# Patient Record
Sex: FEMALE | Race: WHITE | Employment: PART TIME | URBAN - METROPOLITAN AREA
[De-identification: names, ages, dates, MRNs, and addresses within clinical notes are randomized per-mention and may not be internally consistent; named-entity substitution may affect disease eponyms.]

---

## 2019-02-12 NOTE — Clinical Note
Thank you for referring Tejal to the [facility]. Consult was completed today via person.  I have referred for a nutrition consultation with our dietician.  I counseled on the importance of lifestyle intervention in adjunct with medication and started Nal/Bupropion XL for treatment with follow-up advised in about 3 months. 
no

## 2022-02-15 PROBLEM — G47.10 HYPERSOMNIA, PERSISTENT: Status: ACTIVE | Noted: 2022-02-15

## 2022-02-15 PROBLEM — G47.10 HYPERSOMNIA: Status: ACTIVE | Noted: 2022-02-15

## 2022-12-06 ENCOUNTER — OFFICE VISIT (OUTPATIENT)
Facility: LOCATION | Age: 67
End: 2022-12-06
Payer: MEDICARE

## 2022-12-06 VITALS — TEMPERATURE: 97 F | HEART RATE: 69 BPM

## 2022-12-06 DIAGNOSIS — M79.651 PAIN IN RIGHT THIGH: Primary | ICD-10-CM

## 2022-12-12 ENCOUNTER — TELEPHONE (OUTPATIENT)
Facility: LOCATION | Age: 67
End: 2022-12-12

## 2022-12-12 NOTE — TELEPHONE ENCOUNTER
Pt is having an MRI of the right thigh on Thursday 12/15. Pt states she is also having issues with lipomas on her left thigh also and would like to know if the MRI can also be for the left leg.

## 2022-12-15 ENCOUNTER — HOSPITAL ENCOUNTER (OUTPATIENT)
Dept: MRI IMAGING | Age: 67
Discharge: HOME | End: 2022-12-15
Attending: SURGERY
Payer: MEDICARE

## 2022-12-15 ENCOUNTER — TELEPHONE (OUTPATIENT)
Facility: LOCATION | Age: 67
End: 2022-12-15

## 2022-12-15 DIAGNOSIS — M79.651 PAIN IN RIGHT THIGH: ICD-10-CM

## 2022-12-15 PROCEDURE — A9575 INJ GADOTERATE MEGLUMI 0.1ML: HCPCS

## 2022-12-15 PROCEDURE — 73720 MRI LWR EXTREMITY W/O&W/DYE: CPT | Performed by: SURGERY

## 2022-12-15 RX ORDER — GADOTERATE MEGLUMINE 376.9 MG/ML
20 INJECTION INTRAVENOUS
Status: COMPLETED | OUTPATIENT
Start: 2022-12-15 | End: 2022-12-15

## 2022-12-15 RX ADMIN — GADOTERATE MEGLUMINE 20 ML: 376.9 INJECTION INTRAVENOUS at 15:27:00

## 2022-12-15 NOTE — TELEPHONE ENCOUNTER
Per Medicare Guidelines -no authorization is required for Excision Masses of Right Thigh CPT 46208    Status: Authorization is not required however may be subject to review once claim is submitted-Covered Benefit

## 2022-12-21 DIAGNOSIS — M79.651 PAIN OF RIGHT THIGH: Primary | ICD-10-CM

## 2023-01-03 ENCOUNTER — TELEPHONE (OUTPATIENT)
Dept: ORTHOPEDICS CLINIC | Facility: CLINIC | Age: 68
End: 2023-01-03

## 2023-01-03 DIAGNOSIS — M25.552 BILATERAL HIP PAIN: Primary | ICD-10-CM

## 2023-01-03 DIAGNOSIS — M25.551 BILATERAL HIP PAIN: Primary | ICD-10-CM

## 2023-01-03 NOTE — TELEPHONE ENCOUNTER
NP Bilateral Hip Pain/MRI in Epic. Please advise if additional imaging is needed.   Future Appointments   Date Time Provider Celestina Savannah   2/3/2023  9:00 AM Johnny Manley MD [facility] [id number]   3/6/2023  9:00 AM CARLO Dawson [facility] ([facility]) BRIDGETTE Lopes

## 2023-02-03 ENCOUNTER — HOSPITAL ENCOUNTER (OUTPATIENT)
Dept: GENERAL RADIOLOGY | Age: 68
Discharge: HOME | End: 2023-02-03
Attending: ORTHOPAEDIC SURGERY
Payer: MEDICARE

## 2023-02-03 ENCOUNTER — OFFICE VISIT (OUTPATIENT)
Dept: ORTHOPEDICS CLINIC | Facility: CLINIC | Age: 68
End: 2023-02-03
Payer: MEDICARE

## 2023-02-03 VITALS — OXYGEN SATURATION: 97 % | WEIGHT: 238 LBS | BODY MASS INDEX: 42.17 KG/M2 | HEIGHT: 63 IN | HEART RATE: 65 BPM

## 2023-02-03 DIAGNOSIS — M25.552 BILATERAL HIP PAIN: ICD-10-CM

## 2023-02-03 DIAGNOSIS — M16.0 PRIMARY OSTEOARTHRITIS OF BOTH HIPS: Primary | ICD-10-CM

## 2023-02-03 DIAGNOSIS — M25.551 BILATERAL HIP PAIN: ICD-10-CM

## 2023-02-03 PROCEDURE — 99204 OFFICE O/P NEW MOD 45 MIN: CPT | Performed by: ORTHOPAEDIC SURGERY

## 2023-02-03 PROCEDURE — 73523 X-RAY EXAM HIPS BI 5/> VIEWS: CPT | Performed by: ORTHOPAEDIC SURGERY

## 2023-03-17 ENCOUNTER — OFFICE VISIT (OUTPATIENT)
Dept: ORTHOPEDICS CLINIC | Facility: CLINIC | Age: 68
End: 2023-03-17
Payer: MEDICARE

## 2023-03-17 VITALS — HEART RATE: 64 BPM | OXYGEN SATURATION: 95 %

## 2023-03-17 DIAGNOSIS — M16.0 PRIMARY OSTEOARTHRITIS OF BOTH HIPS: Primary | ICD-10-CM

## 2023-03-17 PROCEDURE — 20611 DRAIN/INJ JOINT/BURSA W/US: CPT | Performed by: ORTHOPAEDIC SURGERY

## 2023-03-17 RX ORDER — TRIAMCINOLONE ACETONIDE 40 MG/ML
80 INJECTION, SUSPENSION INTRA-ARTICULAR; INTRAMUSCULAR ONCE
Status: COMPLETED | OUTPATIENT
Start: 2023-03-17 | End: 2023-03-17

## 2023-03-17 RX ADMIN — TRIAMCINOLONE ACETONIDE 80 MG: 40 INJECTION, SUSPENSION INTRA-ARTICULAR; INTRAMUSCULAR at 09:45:00

## 2023-03-17 NOTE — PROCEDURES
After informed consent, the patient's right and left hips were marked, prepped with topical antiseptic, and locally anesthetized with skin refrigerant followed by injection of 1% lidocaine to create a skin wheal anteriorly at an insertion site a few fingerbreadths lateral to the femoral pulse, along the trajectory of the femoral neck. Next, the area was re-prepped with topical antiseptic, and ultrasound guidance was performed to direct a 20 gauge spinal needle into the hip joint at the junction of the femoral head and neck, after which a mixture of 1mL 40mg/mL Kenalog, 2mL 1% lidocaine and 2mL 0.5% marcaine was injected while visualizing the fluid under ultrasound. Confirmatory imaging was saved to the patient's chart. A band-aid was applied. The patient tolerated the procedure well.     Klever Cast MD, [address] [facility]  Phone [phone]  Fax [phone]

## 2024-01-19 ENCOUNTER — OFFICE VISIT (OUTPATIENT)
Dept: ORTHOPEDICS CLINIC | Facility: CLINIC | Age: 69
End: 2024-01-19
Payer: MEDICARE

## 2024-01-19 DIAGNOSIS — M16.0 PRIMARY OSTEOARTHRITIS OF BOTH HIPS: Primary | ICD-10-CM

## 2024-01-19 PROCEDURE — 20611 DRAIN/INJ JOINT/BURSA W/US: CPT | Performed by: ORTHOPAEDIC SURGERY

## 2024-01-19 RX ORDER — TRIAMCINOLONE ACETONIDE 40 MG/ML
80 INJECTION, SUSPENSION INTRA-ARTICULAR; INTRAMUSCULAR ONCE
Status: COMPLETED | OUTPATIENT
Start: 2024-01-19 | End: 2024-01-19

## 2024-01-19 RX ADMIN — TRIAMCINOLONE ACETONIDE 80 MG: 40 INJECTION, SUSPENSION INTRA-ARTICULAR; INTRAMUSCULAR at 09:23:00

## 2024-01-19 NOTE — PROCEDURES
Risks and benefits of hip injection discussed with the patient, with risks including but not limited to pain and swelling at the injection site and/or within the hip joint, infection, elevation in blood pressure and/or glucose levels, facial flushing. After informed consent, the patient's right and left hips were marked, prepped with topical antiseptic, and locally anesthetized with skin refrigerant followed by injection of 1% lidocaine to create a skin wheal anteriorly at an insertion site a few fingerbreadths lateral to the femoral pulse, along the trajectory of the femoral neck.  Next, the area was re-prepped with topical antiseptic, and ultrasound guidance was performed to direct a 20 gauge spinal needle into the hip joint at the junction of the femoral head and neck, after which a mixture of 1mL 40mg/mL Kenalog, 2mL 1% lidocaine and 2mL 0.5% marcaine was injected while visualizing the fluid under ultrasound.  Confirmatory imaging was saved to the patient's chart.  A band-aid was applied.  The patient tolerated the procedure well.    Yosef Prince MD, FAAOS  [facility]  Phone [phone]  Fax [phone]

## 2024-04-29 ENCOUNTER — TELEPHONE (OUTPATIENT)
Dept: ORTHOPEDICS CLINIC | Facility: CLINIC | Age: 69
End: 2024-04-29

## 2024-04-29 DIAGNOSIS — M25.561 PAIN IN BOTH KNEES, UNSPECIFIED CHRONICITY: Primary | ICD-10-CM

## 2024-04-29 DIAGNOSIS — M25.562 PAIN IN BOTH KNEES, UNSPECIFIED CHRONICITY: Primary | ICD-10-CM

## 2024-04-29 NOTE — TELEPHONE ENCOUNTER
Patient is coming in for bilateral knee and shin pain.    No xray or imaging in EPIC  Please advise if needed.

## 2024-05-06 ENCOUNTER — HOSPITAL ENCOUNTER (OUTPATIENT)
Dept: GENERAL RADIOLOGY | Age: 69
Discharge: HOME | End: 2024-05-06
Attending: FAMILY MEDICINE
Payer: MEDICARE

## 2024-05-06 ENCOUNTER — OFFICE VISIT (OUTPATIENT)
Dept: ORTHOPEDICS CLINIC | Facility: CLINIC | Age: 69
End: 2024-05-06
Payer: MEDICARE

## 2024-05-06 VITALS — BODY MASS INDEX: 49.09 KG/M2 | HEIGHT: 61 IN | WEIGHT: 260 LBS

## 2024-05-06 DIAGNOSIS — M17.12 PRIMARY OSTEOARTHRITIS OF LEFT KNEE: Primary | ICD-10-CM

## 2024-05-06 DIAGNOSIS — M25.562 PAIN IN BOTH KNEES, UNSPECIFIED CHRONICITY: ICD-10-CM

## 2024-05-06 DIAGNOSIS — M17.11 PRIMARY OSTEOARTHRITIS OF RIGHT KNEE: ICD-10-CM

## 2024-05-06 DIAGNOSIS — M25.561 PAIN IN BOTH KNEES, UNSPECIFIED CHRONICITY: ICD-10-CM

## 2024-05-06 PROCEDURE — 73564 X-RAY EXAM KNEE 4 OR MORE: CPT | Performed by: FAMILY MEDICINE

## 2024-05-06 PROCEDURE — 99204 OFFICE O/P NEW MOD 45 MIN: CPT | Performed by: FAMILY MEDICINE

## 2024-05-06 PROCEDURE — 20610 DRAIN/INJ JOINT/BURSA W/O US: CPT | Performed by: FAMILY MEDICINE

## 2024-05-06 RX ORDER — TRIAMCINOLONE ACETONIDE 40 MG/ML
40 INJECTION, SUSPENSION INTRA-ARTICULAR; INTRAMUSCULAR ONCE
Status: COMPLETED | OUTPATIENT
Start: 2024-05-06 | End: 2024-05-06

## 2024-05-06 RX ORDER — KETOROLAC TROMETHAMINE 30 MG/ML
30 INJECTION, SOLUTION INTRAMUSCULAR; INTRAVENOUS ONCE
Status: COMPLETED | OUTPATIENT
Start: 2024-05-06 | End: 2024-05-06

## 2024-05-06 RX ORDER — DICLOFENAC SODIUM 75 MG/1
75 TABLET, DELAYED RELEASE ORAL 2 TIMES DAILY
Qty: 28 TABLET | Refills: 1 | Status: SHIPPED | OUTPATIENT
Start: 2024-05-06

## 2024-05-06 RX ADMIN — TRIAMCINOLONE ACETONIDE 40 MG: 40 INJECTION, SUSPENSION INTRA-ARTICULAR; INTRAMUSCULAR at 10:42:00

## 2024-05-06 RX ADMIN — KETOROLAC TROMETHAMINE 30 MG: 30 INJECTION, SOLUTION INTRAMUSCULAR; INTRAVENOUS at 10:42:00

## 2024-05-08 NOTE — H&P
Sports Medicine Clinic Note     Subjective:    Chief Complaint   Patient presents with    Knee Pain     Bl knee pain onset: 3 weeks ago  - had steroid inj in 01.24 for hips   - did a lot of work cleaning and pain has worsened since, pain on the shins and lower part of the knee      Chief Complaint: Bilateral knee pain.    HPI: This is a pleasant 68 year old female who presents with persistent bilateral knee pain present for years but worse for the past several weeks. The pain is described as aching in quality.     - The pain is described as a deep, aching sensation.   - The pain seems to be aggravated by prolonged walking or standing and seems to improve with rest.   - There is no history of any acute trauma or injury.   - The patient mentions occasional morning stiffness which improves with activity.   - The patient does not report any locking, catching, or giving way of the knee.   - The patient has tried over-the-counter analgesics with minimal relief.    Has previously seen my colleague Dr. Prince for her hips and did well with articular CSI, requests similar treatment for her knee pain today.     Objective:    Bilateral Knee Exam:    Inspection:  Antalgic gait.  Varus alignment.  No appreciable muscle atrophy.  No warmth, erythema, or effusion.    Palpation:  Tenderness over the medial joint line, lateral joint line, patella, with patellofemoral compression  No tenderness over the tibial tubercle, suprapatellar pouch, pes anserine bursa, popliteal fossa, distal IT band, or other compartments of the leg.  Flexion from 0-135 degrees.  Extensor mechanism intact and without palpable defects.  No audible crepitus.    Neurovascular  SILT S / S / SP / DP / Tib nerve distributions. Fires quad / hamstrings / GSC / TA / EHL.  2+ DP & PT pulses, brisk cap refill    Special  Normal patellar tracking  No laxity/opening to varus/valgus force at both 30 and 0 degrees  Negative Pivot Shift  Negative Lachman  Negative  Posterior Drawer  Negative Bryant    Diagnostic Studies:    Radiographs of the both knees personally reviewed in office and no apparent fracture or dislocation seen. Joint space narrowing with marginal osteophytes are noted in all compartments, most prominent in the medial tibiofemoral compartments    Assessment:    68 year old female with clinical history and examination consistent with    Primary osteoarthritis, bilateral knee    Plan:    The patient is recommended to undergo PT order focusing on quadriceps strengthening and mobility exercises, deferred for now in favor of better pain control. Recommend medications such as Tylenol for pain management. Encouraged weight loss which she is actively working on. A prescription for Oral Diclofenac daily is also given for additional pain control. After a comprehensive discussion about the potential benefits and limitations of injection therapy as a treatment option for the suspected diagnosis, the patient opted to proceed with a therapeutic injection during today's office visit. Activity is advised to be limited to tolerance. Tentative follow-up scheduled in 1-2 weeks to reassess the treatment plan and adapt as necessary.     Procedure Note:    After discussion of the risks and benefits, the patient elected to proceed with a therapeutic injection into the bilateral knee. Confirmed that the patient does not have history of prior adverse reactions, active infections, or relevant allergies. There was no erythema or warmth, and the skin was clear.    The skin was sterilized with ChloraPrep. A 22 gauge needle was inserted via inferolateral approach. The site was injected with a mixture of 1 mL of kenalog 40 mg/mL, 1 mL of Toradol 30 mg/mL and 1 mL of 1% Lidocaine without Epinephrine. The injection was completed without complication, and a bandage was applied.    The patient tolerated the procedure well and was instructed to avoid strenuous activity for the next 24-48 hours  and to use ice or Tylenol for pain as needed. The patient will call immediately with any signs of infection or allergic reaction.    POST-INJECTION CARE: The patient tolerated the procedure well. An occlusive bandage was placed over the injection site. Post-injection care instructions provided to the patient. The patient was asked to avoid strenuous activity and continue to rest the area for 2-3 days before resuming regular activities. Patient advised that the area may be more painful for the first 1-2 days. They can use ice or Tylenol for pain as needed.  Patient was instructed to watch for fever, increased swelling, or persistent pain. The patient will call immediately with any signs of infection or allergic reaction.    COMPLICATIONS: The patient tolerated the procedure well without any complications.        Bjorn Ramirez DO, CAQSM   Primary Care Sports Medicine    Department of Orthopaedic Surgery  [address] [address]   [address] [place] [id number]    t: [phone]  f: [phone]      [facility]

## 2024-05-09 ENCOUNTER — PATIENT MESSAGE (OUTPATIENT)
Dept: ORTHOPEDICS CLINIC | Facility: CLINIC | Age: 69
End: 2024-05-09

## 2024-05-09 NOTE — TELEPHONE ENCOUNTER
LOV 5/6/24 knee steroid.  OV note states return to clinic 1-2 weeks.  Pt scheduled 10 day return visit to address hip pain, difficulty ambulating.  Pt wants to confirm is okay to get hip inj so close to when had knee inj.   Thank you!

## 2024-05-09 NOTE — TELEPHONE ENCOUNTER
From: Tejal Franz  To: Bjorn POST  Sent: 5/9/2024 9:37 AM CDT  Subject: OK to have earlier appointment    Hi Dr. Ramirez, I saw you on 5/6/24, and you gave me knee injections. You wanted to see me 2 weeks later. Made appointment for 5/23/24 for follow up and hip injections. I can hardly walk now, and in a lot of pain. I just made another appointment for 5/16/24, which is only 10 days after the knee injections. Is that okay, or do I need to wait until 5/23/24. Thank you!

## 2024-05-14 ENCOUNTER — OFFICE VISIT (OUTPATIENT)
Dept: FAMILY MEDICINE CLINIC | Facility: CLINIC | Age: 69
End: 2024-05-14

## 2024-05-14 ENCOUNTER — LAB ENCOUNTER (OUTPATIENT)
Dept: LAB | Age: 69
End: 2024-05-14
Attending: NURSE PRACTITIONER

## 2024-05-14 ENCOUNTER — TELEPHONE (OUTPATIENT)
Facility: CLINIC | Age: 69
End: 2024-05-14

## 2024-05-14 VITALS
DIASTOLIC BLOOD PRESSURE: 82 MMHG | RESPIRATION RATE: 20 BRPM | HEART RATE: 72 BPM | TEMPERATURE: 98 F | SYSTOLIC BLOOD PRESSURE: 126 MMHG | WEIGHT: 262 LBS | HEIGHT: 61.22 IN | BODY MASS INDEX: 49.47 KG/M2

## 2024-05-14 DIAGNOSIS — Z23 NEED FOR VACCINATION: ICD-10-CM

## 2024-05-14 DIAGNOSIS — Z13.820 SCREENING FOR OSTEOPOROSIS: ICD-10-CM

## 2024-05-14 DIAGNOSIS — Z13.0 SCREENING FOR DEFICIENCY ANEMIA: ICD-10-CM

## 2024-05-14 DIAGNOSIS — Z13.29 SCREENING FOR THYROID DISORDER: ICD-10-CM

## 2024-05-14 DIAGNOSIS — M25.50 PAIN IN JOINT, MULTIPLE SITES: Primary | ICD-10-CM

## 2024-05-14 DIAGNOSIS — E66.01 MORBID OBESITY WITH BMI OF 45.0-49.9, ADULT (HCC): ICD-10-CM

## 2024-05-14 DIAGNOSIS — Z12.11 SCREENING FOR MALIGNANT NEOPLASM OF COLON: ICD-10-CM

## 2024-05-14 DIAGNOSIS — M25.50 ARTHRALGIA OF MULTIPLE JOINTS: ICD-10-CM

## 2024-05-14 DIAGNOSIS — Z82.49 FAMILY HISTORY OF HEART DISEASE: ICD-10-CM

## 2024-05-14 DIAGNOSIS — Z13.1 SCREENING FOR DIABETES MELLITUS: ICD-10-CM

## 2024-05-14 DIAGNOSIS — I83.90 VARICOSE VEINS: ICD-10-CM

## 2024-05-14 DIAGNOSIS — M16.0 PRIMARY OSTEOARTHRITIS OF BOTH HIPS: ICD-10-CM

## 2024-05-14 DIAGNOSIS — Z12.31 ENCOUNTER FOR SCREENING MAMMOGRAM FOR MALIGNANT NEOPLASM OF BREAST: ICD-10-CM

## 2024-05-14 DIAGNOSIS — Z13.220 SCREENING FOR LIPID DISORDERS: ICD-10-CM

## 2024-05-14 DIAGNOSIS — Z00.00 ENCOUNTER FOR ANNUAL HEALTH EXAMINATION: Primary | ICD-10-CM

## 2024-05-14 DIAGNOSIS — M17.0 PRIMARY OSTEOARTHRITIS OF BOTH KNEES: ICD-10-CM

## 2024-05-14 DIAGNOSIS — Z78.0 POST-MENOPAUSAL: ICD-10-CM

## 2024-05-14 DIAGNOSIS — Z71.85 VACCINE COUNSELING: ICD-10-CM

## 2024-05-14 LAB
ALBUMIN SERPL-MCNC: 3.5 G/DL (ref 3.4–5)
ALBUMIN/GLOB SERPL: 0.8 {RATIO} (ref 1–2)
ALP LIVER SERPL-CCNC: 73 U/L
ALT SERPL-CCNC: 25 U/L
ANION GAP SERPL CALC-SCNC: 6 MMOL/L (ref 0–18)
AST SERPL-CCNC: 25 U/L (ref 15–37)
BASOPHILS # BLD AUTO: 0.03 X10(3) UL (ref 0–0.2)
BASOPHILS NFR BLD AUTO: 0.4 %
BILIRUB SERPL-MCNC: 0.6 MG/DL (ref 0.1–2)
BUN BLD-MCNC: 27 MG/DL (ref 9–23)
CALCIUM BLD-MCNC: 9.4 MG/DL (ref 8.5–10.1)
CHLORIDE SERPL-SCNC: 107 MMOL/L (ref 98–112)
CHOLEST SERPL-MCNC: 183 MG/DL (ref ?–200)
CO2 SERPL-SCNC: 26 MMOL/L (ref 21–32)
CREAT BLD-MCNC: 0.85 MG/DL
CRP SERPL-MCNC: 1.3 MG/DL (ref ?–0.3)
EGFRCR SERPLBLD CKD-EPI 2021: 74 ML/MIN/1.73M2 (ref 60–?)
EOSINOPHIL # BLD AUTO: 0.21 X10(3) UL (ref 0–0.7)
EOSINOPHIL NFR BLD AUTO: 2.6 %
ERYTHROCYTE [DISTWIDTH] IN BLOOD BY AUTOMATED COUNT: 13 %
ERYTHROCYTE [SEDIMENTATION RATE] IN BLOOD: 49 MM/HR
EST. AVERAGE GLUCOSE BLD GHB EST-MCNC: 120 MG/DL (ref 68–126)
FASTING PATIENT LIPID ANSWER: NO
FASTING STATUS PATIENT QL REPORTED: NO
GLOBULIN PLAS-MCNC: 4.4 G/DL (ref 2.8–4.4)
GLUCOSE BLD-MCNC: 84 MG/DL (ref 70–99)
HBA1C MFR BLD: 5.8 % (ref ?–5.7)
HCT VFR BLD AUTO: 45 %
HDLC SERPL-MCNC: 64 MG/DL (ref 40–59)
HGB BLD-MCNC: 14.3 G/DL
IMM GRANULOCYTES # BLD AUTO: 0.03 X10(3) UL (ref 0–1)
IMM GRANULOCYTES NFR BLD: 0.4 %
LDLC SERPL CALC-MCNC: 101 MG/DL (ref ?–100)
LYMPHOCYTES # BLD AUTO: 2.69 X10(3) UL (ref 1–4)
LYMPHOCYTES NFR BLD AUTO: 33.5 %
MCH RBC QN AUTO: 29.1 PG (ref 26–34)
MCHC RBC AUTO-ENTMCNC: 31.8 G/DL (ref 31–37)
MCV RBC AUTO: 91.5 FL
MONOCYTES # BLD AUTO: 0.81 X10(3) UL (ref 0.1–1)
MONOCYTES NFR BLD AUTO: 10.1 %
NEUTROPHILS # BLD AUTO: 4.27 X10 (3) UL (ref 1.5–7.7)
NEUTROPHILS # BLD AUTO: 4.27 X10(3) UL (ref 1.5–7.7)
NEUTROPHILS NFR BLD AUTO: 53 %
NONHDLC SERPL-MCNC: 119 MG/DL (ref ?–130)
OSMOLALITY SERPL CALC.SUM OF ELEC: 292 MOSM/KG (ref 275–295)
PLATELET # BLD AUTO: 269 10(3)UL (ref 150–450)
POTASSIUM SERPL-SCNC: 4.6 MMOL/L (ref 3.5–5.1)
PROT SERPL-MCNC: 7.9 G/DL (ref 6.4–8.2)
RBC # BLD AUTO: 4.92 X10(6)UL
SODIUM SERPL-SCNC: 139 MMOL/L (ref 136–145)
TRIGL SERPL-MCNC: 102 MG/DL (ref 30–149)
TSI SER-ACNC: 2.27 MIU/ML (ref 0.36–3.74)
URATE SERPL-MCNC: 4.4 MG/DL
VLDLC SERPL CALC-MCNC: 17 MG/DL (ref 0–30)
WBC # BLD AUTO: 8 X10(3) UL (ref 4–11)

## 2024-05-14 PROCEDURE — 85652 RBC SED RATE AUTOMATED: CPT

## 2024-05-14 PROCEDURE — 80053 COMPREHEN METABOLIC PANEL: CPT

## 2024-05-14 PROCEDURE — 86225 DNA ANTIBODY NATIVE: CPT

## 2024-05-14 PROCEDURE — 80061 LIPID PANEL: CPT

## 2024-05-14 PROCEDURE — 86038 ANTINUCLEAR ANTIBODIES: CPT

## 2024-05-14 PROCEDURE — 86431 RHEUMATOID FACTOR QUANT: CPT

## 2024-05-14 PROCEDURE — 84443 ASSAY THYROID STIM HORMONE: CPT

## 2024-05-14 PROCEDURE — G0438 PPPS, INITIAL VISIT: HCPCS | Performed by: NURSE PRACTITIONER

## 2024-05-14 PROCEDURE — 83036 HEMOGLOBIN GLYCOSYLATED A1C: CPT

## 2024-05-14 PROCEDURE — 86200 CCP ANTIBODY: CPT

## 2024-05-14 PROCEDURE — 84550 ASSAY OF BLOOD/URIC ACID: CPT

## 2024-05-14 PROCEDURE — 86140 C-REACTIVE PROTEIN: CPT

## 2024-05-14 PROCEDURE — 85025 COMPLETE CBC W/AUTO DIFF WBC: CPT

## 2024-05-14 PROCEDURE — 99213 OFFICE O/P EST LOW 20 MIN: CPT | Performed by: NURSE PRACTITIONER

## 2024-05-14 NOTE — PROGRESS NOTES
Subjective:   Tejal Franz is a 68 year old female who presents for a Medicare Initial Annual Wellness visit (Once after 12 month Medicare anniversary)  and  additional concerns addressed .     New patient, establish care. Has not seen general practitioner x many years. Last pap smear- 18 years ago. Patient declines pap/pelvic/breast exam. Patient has never had a colonoscopy or mammogram and refuses mammogram and colonoscopy. She is aware of the risks associated with not completing screening/testing including undiagnosed breast cancer or colon cancer. Per patient \"ignorance is bliss and I would not do any treatment for cancer if I was diagnosed\".   She denies any family history of breast or colon cancer.    + osteoarthritis bilateral knees and hips. She is seeing Dr. Ramirez- recently had steroid injections to her bilateral knees and seeing Dr. Ramirez later this week for bilateral hip steroid injections. Has been needing to use wheeled walker. Taking Diclofenac BID--does not take with food. Starting acupuncture later this week as well. Was taking extra strength Tylenol- but did not  refill from Dr. Ramirez. Wondering if there is another medication that may help. + multiple joint arthralgias.    Dad- hx of emphysema, smoker, heart disease.   Mom- passed away from MI.   Immunizations: Discussed Tdap, Prevnar and Shingrix with the patient today--she will follow up with her pharmacy for these vaccinations.   Vision- wears glasses, history of bilateral cataract removal- [facility].  Dental- goes twice/year for dental exams. Up to date.      History/Other:   Fall Risk Assessment:   She has been screened for Falls and is High Risk. Fall Prevention information provided to patient in After Visit Summary.    Do you feel unsteady when standing or walking?: Yes  Do you worry about falling?: Yes  Have you fallen in the past year?: No     Cognitive Assessment:   She had a completely normal cognitive  assessment - see flowsheet entries     Functional Ability/Status:   Tejal Franz has some abnormal functions as listed below:  She has Dressing and/or Bathing issues based on screening of functional status.  Difficulty dressing or bathing?: Yes  Bathing or Showering: Able without help  Dressing: Able without help  She has Walking problems based on screening of functional status. She has problems with Daily Activities based on screening of functional status.       Depression Screening (PHQ-2/PHQ-9): PHQ-2 SCORE: 0  , done 2024       Advanced Directives:   She does have a Living Will but we do NOT have it on file in Epic.    She does have a POA but we do NOT have it on file in Epic.    Discussed Advance Care Planning with patient (and family/surrogate if present). Standard forms made available to patient in After Visit Summary.      Patient Active Problem List   Diagnosis    Ex-smoker    Depression    Obese    Dysthymic disorder    Hypersomnia, persistent     Allergies:  She has No Known Allergies.    Current Medications:  Outpatient Medications Marked as Taking for the 24 encounter (Office Visit) with Consuelo Hogue APRN   Medication Sig    diclofenac 75 MG Oral Tab EC Take 1 tablet (75 mg total) by mouth 2 (two) times daily.    Acetaminophen 500 MG Oral Cap Take 2 capsules (1,000 mg total) by mouth every 6 (six) hours for 14 days.    escitalopram (LEXAPRO) 20 MG Oral Tab Take 1 tablet (20 mg total) by mouth daily.    Calcium Carbonate-Vitamin D 600-400 MG-UNIT Oral Tab Take 1 tablet by mouth daily.    Multiple Vitamins-Minerals (CENTRUM SILVER) Oral Tab Take 1 tablet by mouth daily.       Medical History:  She  has a past medical history of Depression and Obesity, unspecified.  Surgical History:  She  has a past surgical history that includes ; other surgical history (); other surgical history; and other surgical history.   Family History:  Her family history includes Heart Disorder in her  father and mother; Other in her father.  Social History:  She  reports that she quit smoking about 20 years ago. Her smoking use included cigarettes. She started smoking about 52 years ago. She has a 57.6 pack-year smoking history. She has never used smokeless tobacco. She reports current alcohol use. She reports that she does not use drugs.    Tobacco:  She smoked tobacco in the past but quit greater than 12 months ago.  Social History     Tobacco Use   Smoking Status Former    Current packs/day: 0.00    Average packs/day: 1.8 packs/day for 32.0 years (57.6 ttl pk-yrs)    Types: Cigarettes    Start date: 1972    Quit date: 2004    Years since quittin.3   Smokeless Tobacco Never   Tobacco Comments    quit about 8 years ago          CAGE Alcohol Screen:   CAGE screening score of 0 on 2024, showing low risk of alcohol abuse.      No care team member to display    Review of Systems     Negative except HPI    Objective:   Physical Exam  General appearance: alert, appears stated age, and cooperative  Eyes: conjunctivae/corneas clear. PERRL, EOM's intact. Fundi benign.  Ears: normal TM's and external ear canals both ears  Nose: Nares normal. Septum midline. Mucosa normal. No drainage or sinus tenderness.  Throat: lips, mucosa, and tongue normal; teeth and gums normal  Neck: no adenopathy, supple, symmetrical, trachea midline, and thyroid not enlarged, symmetric, no tenderness/mass/nodules  Lungs: clear to auscultation bilaterally  Breasts:   pt declines exam  Heart: S1, S2 normal, no murmur, click, rub or gallop, regular rate and rhythm  Abdomen: soft, non-tender; bowel sounds normal; no masses,  no organomegaly  Pelvic:  pt declines exam  Extremities: extremities normal, atraumatic, no cyanosis or edema and + superficial varicosities to bilateral lower extremities  Pulses: 2+ and symmetric  Lymph nodes: Cervical, supraclavicular, and axillary nodes normal.  Neurologic: Grossly normal    /82    Pulse 72   Temp 98 °F (36.7 °C) (Temporal)   Resp 20   Ht 5' 1.22\" (1.555 m)   Wt 262 lb (118.8 kg)   BMI 49.15 kg/m²  Estimated body mass index is 49.15 kg/m² as calculated from the following:    Height as of this encounter: 5' 1.22\" (1.555 m).    Weight as of this encounter: 262 lb (118.8 kg).    Medicare Hearing Assessment:   Hearing Screening    Time taken: 5/14/2024 11:38 AM  Screening Method: Finger Rub  Finger Rub Result: Pass               Assessment & Plan:   Tejal Franz is a 68 year old female who presents for a Medicare Assessment.     1. Encounter for annual health examination (Primary)  2. Post-menopausal  -bone density as ordered.  -     XR DEXA BONE DENSITOMETRY (CPT=77080); Future; Expected date: 05/14/2024  3. Arthralgia of multiple joints  -will check rheum panel  -continue follow up with Dr. Ramirez  -     Rheumatoid Arthritis Factor; Future; Expected date: 05/14/2024  -     Uric Acid; Future; Expected date: 05/14/2024  -     Cyclic Citrullinate Pep. IGG; Future; Expected date: 05/14/2024  -     Sed Rate, Westergren (Automated); Future; Expected date: 05/14/2024  -     C-Reactive Protein; Future; Expected date: 05/14/2024  -     Cancel: Lisa, Direct, Reflex To 9 Enas; Future; Expected date: 05/14/2024  -     Comp Metabolic Panel (14); Future; Expected date: 05/14/2024  -     CBC With Differential With Platelet; Future; Expected date: 05/14/2024  4. Varicose veins  -stable. Per patient no pain with varicose veins.  -monitor.  5. Morbid obesity with BMI of 45.0-49.9, adult (HCC)  -focus on healthy eating, incorporate routine exercise (as able), weight loss.  BMI 49.15.  -     Hemoglobin A1C; Future; Expected date: 05/14/2024  6. Primary osteoarthritis of both hips  -continue follow up with Dr. Ramirez.  7. Primary osteoarthritis of both knees  -continue follow up with Dr. Ramirez.  8. Screening for diabetes mellitus  -     Hemoglobin A1C; Future; Expected date: 05/14/2024  9. Screening for  lipid disorders  -     Lipid Panel; Future; Expected date: 05/14/2024  10. Screening for deficiency anemia  -     CBC With Differential With Platelet; Future; Expected date: 05/14/2024  11. Screening for thyroid disorder  -     TSH W Reflex To Free T4; Future; Expected date: 05/14/2024  12. Family history of heart disease  -discussed [facility], patient declines.  13. Screening for malignant neoplasm of colon  -GI referral placed.  -pt refuses colonoscopy/colon cancer screening.  -     GASTRO - INTERNAL  14. Screening for osteoporosis  -bone density as ordered.  -     XR DEXA BONE DENSITOMETRY (CPT=77080); Future; Expected date: 05/14/2024  15. Encounter for screening mammogram for malignant neoplasm of breast  -mammogram as ordered.  -pt refuses mammogram/breast cancer screening.  -     FLORA BHAVIK 2D+3D SCREENING BILAT (CPT=77067/74882); Future; Expected date: 05/14/2024  16. Vaccine counseling  -discussed Prevnar, Tdap and Shingrix with pt today- she will receive at her pharmacy.  17. Need for vaccination    The patient indicates understanding of these issues and agrees to the plan.      No follow-ups on file.     Consuelo Hogue, APRN, 5/14/2024     Supplementary Documentation:   General Health:  In the past six months, have you lost more than 10 pounds without trying?: 2 - No  Has your appetite been poor?: No  Type of Diet: Balanced  How does the patient maintain a good energy level?: Other  How would you describe your daily physical activity?: Light  How would you describe your current health state?: Fair  How do you maintain positive mental well-being?: Social Interaction;Visiting Friends (has pets)  On a scale of 0 to 10, with 0 being no pain and 10 being severe pain, what is your pain level?: 6 - (Moderate)  In the past six months, have you experienced urine leakage?: 1-Yes  At any time do you feel concerned for the safety/well-being of yourself and/or your children, in your home or elsewhere?: No  Have you had any  immunizations at another office such as Influenza, Hepatitis B, Tetanus, or Pneumococcal?: Quin Franz's SCREENING SCHEDULE   Tests on this list are recommended by your physician but may not be covered, or covered at this frequency, by your insurer.   Please check with your insurance carrier before scheduling to verify coverage.   PREVENTATIVE SERVICES FREQUENCY &  COVERAGE DETAILS LAST COMPLETION DATE   Diabetes Screening    Fasting Blood Sugar /  Glucose    One screening every 12 months if never tested or if previously tested but not diagnosed with pre-diabetes   One screening every 6 months if diagnosed with pre-diabetes Lab Results   Component Value Date    GLU 84 05/14/2024        Cardiovascular Disease Screening    Lipid Panel  Cholesterol  Lipoprotein (HDL)  Triglycerides Covered every 5 years for all Medicare beneficiaries without apparent signs or symptoms of cardiovascular disease Lab Results   Component Value Date    CHOLEST 183 05/14/2024    HDL 64 (H) 05/14/2024     (H) 05/14/2024    TRIG 102 05/14/2024         Electrocardiogram (EKG)   Covered if needed at Welcome to Medicare, and non-screening if indicated for medical reasons -      Ultrasound Screening for Abdominal Aortic Aneurysm (AAA) Covered once in a lifetime for one of the following risk factors    Men who are 65-75 years old and have ever smoked    Anyone with a family history -     Colorectal Cancer Screening  Covered for ages 50-85; only need ONE of the following:    Colonoscopy   Covered every 10 years    Covered every 2 years if patient is at high risk or previous colonoscopy was abnormal -    Health Maintenance   Topic Date Due    Colorectal Cancer Screening  Never done       Flexible Sigmoidoscopy   Covered every 4 years -    Fecal Occult Blood Test Covered annually -   Bone Density Screening    Bone density screening    Covered every 2 years after age 65 if diagnosed with risk of osteoporosis or estrogen  deficiency.    Covered yearly for long-term glucocorticoid medication use (Steroids) No results found for this or any previous visit.      Health Maintenance Due   Topic Date Due    DEXA Scan  Never done      Pap and Pelvic    Pap   Covered every 2 years for women at normal risk; Annually if at high risk -  No recommendations at this time    Chlamydia Annually if high risk -  No recommendations at this time   Screening Mammogram    Mammogram     Recommend annually for all female patients aged 40 and older    One baseline mammogram covered for patients aged 35-39 -    Health Maintenance   Topic Date Due    Mammogram  Never done       Immunizations    Influenza Covered once per flu season  Please get every year -  No recommendations at this time    Pneumococcal Each vaccine ([id number] & [id number]) covered once after 65 Prevnar 13: -    [id number]: -     Pneumococcal Vaccination(1 of 1 - PCV) Never done    Hepatitis B One screening covered for patients with certain risk factors   -  No recommendations at this time    Tetanus Toxoid Not covered by Medicare Part B unless medically necessary (cut with metal); may be covered with your pharmacy prescription benefits -    Tetanus, Diptheria and Pertusis TD and TDaP Not covered by Medicare Part B -  No recommendations at this time    Zoster Not covered by Medicare Part B; may be covered with your pharmacy  prescription benefits 11/17/2015  Zoster Vaccines(2 of 3) due on 01/12/2016

## 2024-05-14 NOTE — PATIENT INSTRUCTIONS
Immunizations: Tdap, Prevnar, Shingrix--complete at you pharmacy.                   Tejal Franz's SCREENING SCHEDULE   Tests on this list are recommended by your physician but may not be covered, or covered at this frequency, by your insurer.   Please check with your insurance carrier before scheduling to verify coverage.   PREVENTATIVE SERVICES FREQUENCY &  COVERAGE DETAILS LAST COMPLETION DATE   Diabetes Screening    Fasting Blood Sugar /  Glucose    One screening every 12 months if never tested or if previously tested but not diagnosed with pre-diabetes   One screening every 6 months if diagnosed with pre-diabetes No results found for: \"GLUCOSE\", \"GLU\"     Cardiovascular Disease Screening    Lipid Panel  Cholesterol  Lipoprotein (HDL)  Triglycerides Covered every 5 years for all Medicare beneficiaries without apparent signs or symptoms of cardiovascular disease No results found for: \"CHOLEST\", \"HDL\", \"LDL\", \"LDLD\", \"LDLC\", \"TRIG\"      Electrocardiogram (EKG)   Covered if needed at Welcome to Medicare, and non-screening if indicated for medical reasons -      Ultrasound Screening for Abdominal Aortic Aneurysm (AAA) Covered once in a lifetime for one of the following risk factors    Men who are 65-75 years old and have ever smoked    Anyone with a family history -     Colorectal Cancer Screening  Covered for ages 50-85; only need ONE of the following:    Colonoscopy   Covered every 10 years    Covered every 2 years if patient is at high risk or previous colonoscopy was abnormal -    Health Maintenance   Topic Date Due    Colorectal Cancer Screening  Never done       Flexible Sigmoidoscopy   Covered every 4 years -    Fecal Occult Blood Test Covered annually -   Bone Density Screening    Bone density screening    Covered every 2 years after age 65 if diagnosed with risk of osteoporosis or estrogen deficiency.    Covered yearly for long-term glucocorticoid medication use (Steroids) No results found for this or  any previous visit.      Health Maintenance Due   Topic Date Due    DEXA Scan  Never done      Pap and Pelvic    Pap   Covered every 2 years for women at normal risk; Annually if at high risk -  No recommendations at this time    Chlamydia Annually if high risk -  No recommendations at this time   Screening Mammogram    Mammogram     Recommend annually for all female patients aged 40 and older    One baseline mammogram covered for patients aged 35-39 -    Health Maintenance   Topic Date Due    Mammogram  Never done       Immunizations    Influenza Covered once per flu season  Please get every year -  No recommendations at this time    Pneumococcal Each vaccine ([id number] & [id number]) covered once after 65 Prevnar 13: -    [id number]: -     Pneumococcal Vaccination(1 of 1 - PCV) Never done    Hepatitis B One screening covered for patients with certain risk factors   -  No recommendations at this time    Tetanus Toxoid Not covered by Medicare Part B unless medically necessary (cut with metal); may be covered with your pharmacy prescription benefits -    Tetanus, Diptheria and Pertusis TD and TDaP Not covered by Medicare Part B -  No recommendations at this time    Zoster Not covered by Medicare Part B; may be covered with your pharmacy  prescription benefits -  Zoster Vaccines(1 of 2) Never done

## 2024-05-14 NOTE — TELEPHONE ENCOUNTER
Consuelo Hogue's office called on patient's behalf to ask if patient is able to take other pain medication prior to her visit with Dr. Ramirez this week. Staff member stated patient is no longer taking acetominophen but is still on diclofenac. Patient would like a call back. Please advise, thank you.    Future Appointments   Date Time Provider Department Center   5/16/2024  8:30 AM Bjorn Ramirez, DO [facility]   5/23/2024 10:30 AM Bjorn Ramirez, DO [facility]   8/27/2024 12:30 PM Shailesh Valadez APRN [facility]

## 2024-05-15 LAB — RHEUMATOID FACT SERPL-ACNC: <10 IU/ML (ref ?–15)

## 2024-05-15 NOTE — TELEPHONE ENCOUNTER
Spoke with patient who stated she is still having the pain in her knees, patient is taking diclofenac oral and tylenol, elevating, alternating heat with cold and didn't feel like the brace helped.  Discussed keeping the tylenol under 3,000mg per day and making sure to take the diclofenac with food.  Patient's battery was running out, but patent stated she will discuss further with Dr Ramirez at tomorrow's appointment.

## 2024-05-16 ENCOUNTER — PATIENT MESSAGE (OUTPATIENT)
Dept: FAMILY MEDICINE CLINIC | Facility: CLINIC | Age: 69
End: 2024-05-16

## 2024-05-16 ENCOUNTER — PATIENT MESSAGE (OUTPATIENT)
Dept: ORTHOPEDICS CLINIC | Facility: CLINIC | Age: 69
End: 2024-05-16

## 2024-05-16 ENCOUNTER — OFFICE VISIT (OUTPATIENT)
Dept: ORTHOPEDICS CLINIC | Facility: CLINIC | Age: 69
End: 2024-05-16

## 2024-05-16 ENCOUNTER — HOSPITAL ENCOUNTER (OUTPATIENT)
Dept: GENERAL RADIOLOGY | Age: 69
Discharge: HOME | End: 2024-05-16
Attending: FAMILY MEDICINE

## 2024-05-16 VITALS — WEIGHT: 262 LBS | HEIGHT: 61.22 IN | BODY MASS INDEX: 49.47 KG/M2

## 2024-05-16 DIAGNOSIS — M25.551 BILATERAL HIP PAIN: ICD-10-CM

## 2024-05-16 DIAGNOSIS — M25.552 BILATERAL HIP PAIN: ICD-10-CM

## 2024-05-16 DIAGNOSIS — M16.0 PRIMARY OSTEOARTHRITIS OF BOTH HIPS: Primary | ICD-10-CM

## 2024-05-16 DIAGNOSIS — M17.12 PRIMARY OSTEOARTHRITIS OF LEFT KNEE: ICD-10-CM

## 2024-05-16 DIAGNOSIS — M17.11 PRIMARY OSTEOARTHRITIS OF RIGHT KNEE: ICD-10-CM

## 2024-05-16 LAB
CCP IGG SERPL-ACNC: 1 U/ML (ref 0–6.9)
DSDNA IGG SERPL IA-ACNC: 0.9 IU/ML
ENA AB SER QL IA: 0.3 UG/L
ENA AB SER QL IA: NEGATIVE

## 2024-05-16 PROCEDURE — 73523 X-RAY EXAM HIPS BI 5/> VIEWS: CPT | Performed by: FAMILY MEDICINE

## 2024-05-16 RX ORDER — TRIAMCINOLONE ACETONIDE 40 MG/ML
40 INJECTION, SUSPENSION INTRA-ARTICULAR; INTRAMUSCULAR ONCE
Status: COMPLETED | OUTPATIENT
Start: 2024-05-16 | End: 2024-05-16

## 2024-05-16 RX ORDER — KETOROLAC TROMETHAMINE 30 MG/ML
30 INJECTION, SOLUTION INTRAMUSCULAR; INTRAVENOUS ONCE
Status: COMPLETED | OUTPATIENT
Start: 2024-05-16 | End: 2024-05-16

## 2024-05-16 RX ADMIN — KETOROLAC TROMETHAMINE 30 MG: 30 INJECTION, SOLUTION INTRAMUSCULAR; INTRAVENOUS at 15:40:00

## 2024-05-16 RX ADMIN — TRIAMCINOLONE ACETONIDE 40 MG: 40 INJECTION, SUSPENSION INTRA-ARTICULAR; INTRAMUSCULAR at 15:45:00

## 2024-05-16 RX ADMIN — TRIAMCINOLONE ACETONIDE 40 MG: 40 INJECTION, SUSPENSION INTRA-ARTICULAR; INTRAMUSCULAR at 15:47:00

## 2024-05-16 RX ADMIN — KETOROLAC TROMETHAMINE 30 MG: 30 INJECTION, SOLUTION INTRAMUSCULAR; INTRAVENOUS at 15:38:00

## 2024-05-16 NOTE — TELEPHONE ENCOUNTER
From: Tejal Franz  To: Consuelo Hogue  Sent: 5/16/2024 11:23 AM CDT  Subject: 5/14/24 Blood Tests    It appears that the care team has not reviewed the results of the blood tests. When do you think they will be reviewed? I apologize, but I am unfamiliar with the normal process, and am just wondering. Thanks.

## 2024-05-16 NOTE — TELEPHONE ENCOUNTER
From: Tejal Franz  To: Bjorn POST  Sent: 5/16/2024 11:16 AM CDT  Subject: Physical Therapy Referral    You mentioned you were going to give me a referral to get physical therapy. Is that something you post on My Chart? Or, was I suppose to get a paper before I left this morning. As I mentioned, I do have a PT appointment and think I may need that. Thanks, and thanks so much for the shots!!!

## 2024-05-16 NOTE — TELEPHONE ENCOUNTER
LOV today.   PT order pended. Please add dx and any protocols if needed. Thanks!    To send to pt via [employer].

## 2024-05-17 DIAGNOSIS — R70.0 ELEVATED SED RATE: Primary | ICD-10-CM

## 2024-05-23 ENCOUNTER — OFFICE VISIT (OUTPATIENT)
Dept: ORTHOPEDICS CLINIC | Facility: CLINIC | Age: 69
End: 2024-05-23

## 2024-05-23 ENCOUNTER — HOSPITAL ENCOUNTER (OUTPATIENT)
Dept: GENERAL RADIOLOGY | Age: 69
Discharge: HOME | End: 2024-05-23
Attending: FAMILY MEDICINE

## 2024-05-23 ENCOUNTER — PATIENT MESSAGE (OUTPATIENT)
Dept: ORTHOPEDICS CLINIC | Facility: CLINIC | Age: 69
End: 2024-05-23

## 2024-05-23 DIAGNOSIS — M54.16 LUMBAR RADICULOPATHY: ICD-10-CM

## 2024-05-23 DIAGNOSIS — M17.12 PRIMARY OSTEOARTHRITIS OF LEFT KNEE: ICD-10-CM

## 2024-05-23 DIAGNOSIS — M16.0 PRIMARY OSTEOARTHRITIS OF BOTH HIPS: ICD-10-CM

## 2024-05-23 DIAGNOSIS — M17.11 PRIMARY OSTEOARTHRITIS OF RIGHT KNEE: ICD-10-CM

## 2024-05-23 DIAGNOSIS — M54.16 LUMBAR RADICULOPATHY: Primary | ICD-10-CM

## 2024-05-23 PROCEDURE — 72110 X-RAY EXAM L-2 SPINE 4/>VWS: CPT | Performed by: FAMILY MEDICINE

## 2024-05-23 PROCEDURE — 99214 OFFICE O/P EST MOD 30 MIN: CPT | Performed by: FAMILY MEDICINE

## 2024-05-23 RX ORDER — PSEUDOEPHED/ACETAMINOPH/DIPHEN 30MG-500MG
TABLET ORAL
COMMUNITY
Start: 2024-05-19

## 2024-05-23 RX ORDER — LIDOCAINE 4 G/G
1 PATCH TOPICAL EVERY 24 HOURS
Qty: 30 PATCH | Refills: 1 | Status: SHIPPED | OUTPATIENT
Start: 2024-05-23

## 2024-05-23 NOTE — TELEPHONE ENCOUNTER
From: Tejal Franz  To: Bjorn POST  Sent: 5/23/2024 1:06 PM CDT  Subject: Follow Up Appointment Scheduled    The earliest date I was able to schedule for a follow up was June 18th. I did add my name to the wait list if an earlier appointment opens up.

## 2024-05-23 NOTE — PROGRESS NOTES
Sports Medicine Clinic Note     Subjective:    Chief Complaint: Follow up regarding bilateral hip and knee pain.    Interval History: The patient is a pleasant 68-year-old female who is well known to me. She presents for follow-up of bilateral knee and bilateral hip pain due to DJD in the setting of obesity. The patient reports some improvement in her knee pain, although it is not completely resolved. She notes that the injections combined with p.o. analgesics seem to be effective but are short-lived and she does have pain shortly prior to being able to take the next dose of medication. Regarding her hips, the patient states that her hip pain is more bothersome than her knee pain, with limited mobility. She reports some improvement following the corticosteroid injections on 5/16/24, but she still experiences significant pain. The patient inquires about the use of patches for pain management ahead of a family member's wedding in the coming weeks.  She inquired about possibility of other type of referred pain possibly from her back that could be causing her symptoms.  She denies any back pain currently.  No bowel or bladder changes.  No neurologic symptoms reported.    Objective:    Bilateral Knee Examination:    Inspection:  - No visible effusion or erythema.  Palpation:  - Tenderness over the medial joint lines.  - No tenderness over the lateral joint line, patella, or tibial tubercle.  Range of Motion:  - Flexion: 0-130 degrees with mild pain at end ranges.  - Extension: 0 degrees.  Neurovascular:  - 2+ dorsalis pedis and posterior tibial pulses.  - Sensation intact to light touch in the femoral, tibial, saphenous, sural, and peroneal nerve distributions.    Bilateral Hip Examination:    Inspection:  - Skin without erythema, breakdown, or rash.  Palpation:  - Pain with palpation over the inguinal grooves bilaterally.  Range of Motion:  - Internal Rotation: 35 degrees.  - External Rotation: 45 degrees.  - Flexion:  135 degrees.  - Abduction: 45 degrees.  - Adduction: 25 degrees.  - Painful passive ROM.  Neurovascular:  - 2+ dorsalis pedis and posterior tibial pulses.  - Sensation intact to light touch in the femoral, tibial, saphenous, sural, deep, and superficial peroneal nerve distributions.  - Fires quadriceps, hamstrings, gastrocnemius-soleus complex, anterior tibialis, and extensor hallucis longus muscle groups.    Imaging:    No new imaging.    Radiographs of the knees and bilateral hips (previously reviewed): Knee radiographs reveal no fracture or dislocation. Joint space narrowing with marginal osteophytes are noted in all compartments, most prominent in the medial tibiofemoral compartments. Bilateral hip radiographs revealed no apparent fracture or dislocation, with joint space narrowing and marginal osteophytes noted bilaterally, right greater than left.    Assessment:    1. BMI 45.0-49.9, adult (HCC)  2. Primary osteoarthritis of left knee  3. Primary osteoarthritis of right knee  4. Primary osteoarthritis of both hips  5. Possible lumbar radiculopathy    Plan:    Additional Imaging/Workup:  - Order lumbar radiographs for surveillance.    Therapy:  - Start physical therapy focusing on hip and knee strengthening and mobility exercises.    Medications:  - Prescribe lidocaine patches for localized pain management.    Bracing/Casting:  - None at this time.    Procedures:  - None at this time.    Activity Recommendations:  - Encourage weight loss and refer to a weight loss clinic for further management.  - Advise the patient to continue with activity modifications, avoiding prolonged standing or walking as tolerated.    Follow-Up: Tentatively scheduled in 4-6 weeks to reassess the treatment plan and adapt as necessary. Instruct the patient to return earlier if symptoms worsen or new symptoms develop.        Bjorn Ramirez DO, CAM   Primary Care Sports Medicine    Department of Orthopaedic Surgery  [facility]  [facility]    [address] [place] [address]  [address] [place] [address]    t: [phone]  f: [phone]      [facility]

## 2024-05-24 ENCOUNTER — PATIENT MESSAGE (OUTPATIENT)
Dept: FAMILY MEDICINE CLINIC | Facility: CLINIC | Age: 69
End: 2024-05-24

## 2024-05-24 ENCOUNTER — PATIENT MESSAGE (OUTPATIENT)
Dept: ORTHOPEDICS CLINIC | Facility: CLINIC | Age: 69
End: 2024-05-24

## 2024-05-24 NOTE — TELEPHONE ENCOUNTER
From: Tejal Franz  To: Bjorn POST  Sent: 5/24/2024 5:26 AM CDT  Subject: Pain Medication    Me again, sorry! Are you able to determine from the spine x-rays if there is something else causing my pain? I need something more for the pain, and I don't know what I can take safely with what I am currently taking. Are you able to prescribe something else? Or, can you give me the name of a pain doctor I can see? [facility] hasn't notified me yet that the rx for the patches has been filled, and I don't know how much they will help. Thanks.

## 2024-05-24 NOTE — TELEPHONE ENCOUNTER
From: Tejal Franz  To: Consuelo Hogue  Sent: 5/24/2024 5:36 AM CDT  Subject: Follow Up Test    I have notification to get a DWAYNE GREWAL (AUTOMATED) retest. I have recently received injections in my knees and hips, and have been seeing Dr. Ramirez for the pain in my legs. I'm thinking perhaps this test should be postponed for the time being, as I'm sure the levels are elevated.

## 2024-05-28 NOTE — TELEPHONE ENCOUNTER
My recommendation to repeat the sed rate remains. The injections she is receiving would not impact this for the level to be elevated. We need to ensure there is not something else occurring as to why her sed rate is elevated.

## 2024-05-29 NOTE — TELEPHONE ENCOUNTER
Ok to schedule next available. Please have her obtain the previous ordered labs before her appt so we can discuss recent sed rate and repeat sed rate.

## 2024-05-29 NOTE — TELEPHONE ENCOUNTER
Patient called back and advised of Consuelo Hogue recommendation for follow up visit and and obtain new sed rate level prior to appointment.  Patient voiced understanding and agreed with plan.      Appointment on 7/1/2024 and will do another sed rate prior to appt.

## 2024-05-30 ENCOUNTER — PATIENT MESSAGE (OUTPATIENT)
Dept: ORTHOPEDICS CLINIC | Facility: CLINIC | Age: 69
End: 2024-05-30

## 2024-05-30 NOTE — TELEPHONE ENCOUNTER
From: Tejal Franz  To: Bjorn POST  Sent: 5/30/2024 5:58 AM CDT  Subject: Referral for Pain Doctor    I have my 3rd PT appointment today, but so far there has been no improvement for the pain. You indicated that you would not be the doctor to go to for that. What type of doctor would I go to? Is there anyone you would recommend within the [facility] system? I would like to make an appointment as soon as possible. Thanks! See you Tuesday.

## 2024-06-02 DIAGNOSIS — M17.11 PRIMARY OSTEOARTHRITIS OF RIGHT KNEE: ICD-10-CM

## 2024-06-02 DIAGNOSIS — M17.12 PRIMARY OSTEOARTHRITIS OF LEFT KNEE: ICD-10-CM

## 2024-06-03 RX ORDER — DICLOFENAC SODIUM 75 MG/1
75 TABLET, DELAYED RELEASE ORAL 2 TIMES DAILY
Qty: 28 TABLET | Refills: 1 | Status: SHIPPED | OUTPATIENT
Start: 2024-06-03

## 2024-06-03 NOTE — TELEPHONE ENCOUNTER
Diclofenac  DOS: N/A  Last OV: 05/23/24  Last refill date: 05/06/24     #/refills: 28/1  Upcoming appt:   Future Appointments   Date Time Provider Department Center   6/4/2024 10:00 AM Bjorn Ramirez, DO [facility]   6/18/2024 10:30 AM Bjorn Ramirez, DO [facility]   7/1/2024 11:30 AM Consuelo Hogue APRN [id number]   8/6/2024 10:00 AM Bjorn Ramirez, DO [facility]   8/15/2024 10:00 AM Bjorn Ramirez, DO [facility]   8/27/2024 12:30 PM Shailesh Valadez APRN [facility]   10/28/2024  1:20 PM Eva Springer APRN [id number]     05/14/24  BUN  9 - 23 mg/dL 27 High    Creatinine  0.55 - 1.02 mg/dL 0.85     eGFR-Cr  >=60 mL/min/1.73m2 74

## 2024-06-04 ENCOUNTER — OFFICE VISIT (OUTPATIENT)
Dept: ORTHOPEDICS CLINIC | Facility: CLINIC | Age: 69
End: 2024-06-04
Payer: MEDICARE

## 2024-06-04 ENCOUNTER — TELEPHONE (OUTPATIENT)
Dept: ORTHOPEDICS CLINIC | Facility: CLINIC | Age: 69
End: 2024-06-04

## 2024-06-04 VITALS — BODY MASS INDEX: 49.47 KG/M2 | WEIGHT: 262 LBS | HEIGHT: 61.22 IN

## 2024-06-04 DIAGNOSIS — M17.11 PRIMARY OSTEOARTHRITIS OF RIGHT KNEE: ICD-10-CM

## 2024-06-04 DIAGNOSIS — M54.16 LUMBAR RADICULOPATHY: Primary | ICD-10-CM

## 2024-06-04 DIAGNOSIS — M43.06 LUMBAR SPONDYLOLYSIS: ICD-10-CM

## 2024-06-04 DIAGNOSIS — M17.12 PRIMARY OSTEOARTHRITIS OF LEFT KNEE: ICD-10-CM

## 2024-06-04 PROCEDURE — 99214 OFFICE O/P EST MOD 30 MIN: CPT | Performed by: FAMILY MEDICINE

## 2024-06-04 NOTE — PROGRESS NOTES
Sports Medicine Clinic Note     Subjective:    Chief Complaint:  Follow-up for bilateral hip and knee pain with symptoms of back pain radiating down the legs.    Interval History:  The patient is a 68-year-old female returning for follow-up on bilateral hip and knee pain. She reports some improvement in knee pain but notes significant pain returns when the effects of Tylenol wear off. The corticosteroid injections provided temporary relief for hip pain, but it remains more troublesome than the knee pain, particularly affecting mobility.    She describes new symptoms of back pain with radiation down the legs, a sharp, shooting sensation that is different from her previous complaints. There is no history of recent trauma or injury to the back. She continues to deny any bowel or bladder changes but has had incontinence issues for some time. Reports no neurological symptoms. Urinary incontinence has remained unchanged without feeling of fullness, still regularly urinates.    Objective:    Lumbar Spine Examination:    Inspection:  Mild lumbar lordosis.  No visible deformity or muscle spasm.  Palpation:  Tenderness over the lower lumbar spine, particularly at the L5-S1 region.  Range of Motion:  Forward flexion, extension, lateral bending, and rotation limited by pain, particularly in the lower lumbar area.  Neurovascular:  Positive straight leg raise test bilaterally.    Bilateral Knee Examination:    Inspection:  No visible effusion or erythema noted.  Palpation:  Tenderness over the medial joint lines.  No tenderness over the lateral joint lines, patella, or tibial tubercle.  Range of Motion:  Flexion: 0-130 degrees with mild pain at end ranges.  Extension: 0 degrees.  Neurovascular:  2+ dorsalis pedis and posterior tibial pulses.  Sensation intact to light touch in the femoral, tibial, saphenous, sural, and peroneal nerve distributions.    Bilateral Hip Examination:    Inspection:  No skin erythema, breakdown, or rash  observed.  Palpation:  Pain with palpation over the inguinal grooves bilaterally.  Range of Motion:  Internal Rotation: 35 degrees.  External Rotation: 45 degrees.  Flexion: 135 degrees.  Abduction: 45 degrees.  Adduction: 25 degrees.  Pain noted with passive ROM.  Neurovascular:  2+ dorsalis pedis and posterior tibial pulses.  Sensation intact to light touch in the femoral, tibial, saphenous, sural, deep, and superficial peroneal nerve distributions.  Adequate muscle firing in the quadriceps, hamstrings, gastrocnemius-soleus complex, anterior tibialis, and extensor hallucis longus.    Diagnostic Tests:    Lumbar Spine X-ray Summary:  Grade 1 anterolisthesis of L5 on S1.  Moderate to severe intervertebral disc height loss at L5-S1.  Multilevel spondylosis with small endplate osteophytes.  Mild lumbar lordosis and minimal lateral curvature.  Vertebral body heights maintained.    Previous Radiographs of Bilateral Knees:  Previous radiographs show no fracture or dislocation. Notable joint space narrowing and marginal osteophytes in all compartments, most prominent in the medial tibiofemoral compartments.    Previous Radiographs of Bilateral Hips: Previously reviewed radiographs reveal joint space narrowing and marginal osteophytes bilaterally, more pronounced on the right side.    Assessment:    Lumbar radiculopathy with potential contribution to bilateral hip and knee symptoms.  Obesity (BMI 45.0-49.9).  Bilateral knee pain secondary to osteoarthritis.  Bilateral hip pain secondary to osteoarthritis.    Plan:    Additional Imaging/Workup:  Order MRI of the lumbar spine to evaluate the extent of radiculopathy and structural issues.    Therapy:  Continue physical therapy focusing on strengthening and mobility exercises for hips and knees.  Include specific exercises to alleviate lumbar radiculopathy symptoms.    Medications:  Prescribe lidocaine patches for localized pain relief.  Continue Tylenol and NSAIDs as needed  for pain management.    Procedures:  Submit prior authorization for viscosupplementation injections for the knees.    Activity Recommendations:  Encourage weight loss; refer to a weight loss clinic for further management.  Advise avoiding prolonged standing or walking as tolerated.  Recommend maintaining gentle activity to support joint function and mobility.    Follow-Up:  Tentative follow-up to review MRI findings. Instruct the patient to return earlier if symptoms worsen or new symptoms develop. Red flag precautions given.        Bjorn Ramirez DO, CAQSM   Primary Care Sports Medicine    Department of Orthopaedic Surgery  [facility]    [address] [place] [id number]  [address] [place] [address]    t: [phone]  f: [phone]      [facility]

## 2024-06-06 RX ORDER — LIDOCAINE 4 G/G
1 PATCH TOPICAL EVERY 24 HOURS
Qty: 10 PATCH | Refills: 0 | Status: SHIPPED | OUTPATIENT
Start: 2024-06-06

## 2024-06-09 ENCOUNTER — PATIENT MESSAGE (OUTPATIENT)
Dept: FAMILY MEDICINE CLINIC | Facility: CLINIC | Age: 69
End: 2024-06-09

## 2024-06-10 NOTE — TELEPHONE ENCOUNTER
Please triage the significant leg pain. She may need to be evaluated TODAY for this. As for the obesity management--patient needs to call her insurance to determine if they cover medication for weight loss ex: Wegovy or Zepbound and we can determine management in our office/July appt for this from there.

## 2024-06-10 NOTE — TELEPHONE ENCOUNTER
Patient called back and she stated that ongoing significant leg and knee pain are being managed by orthopedic surgeon Dr. Ramirez and she has upcoming follow up with ortho Friday 6/18/2024. Patient stated she does not need evaluation today.    Patient stated that Dr. Ramirez was the one who recommended her to see Weight loss Clinic since it will help with symptoms.     Advised that  she needs to call insurance to verify coverage for Wegovy or Zepbound. Patient voiced understanding and agreed with plan of care. She states she will call back.

## 2024-06-11 ENCOUNTER — LAB ENCOUNTER (OUTPATIENT)
Dept: LAB | Age: 69
End: 2024-06-11
Attending: NURSE PRACTITIONER
Payer: MEDICARE

## 2024-06-11 DIAGNOSIS — R70.0 ELEVATED SED RATE: ICD-10-CM

## 2024-06-11 LAB — ERYTHROCYTE [SEDIMENTATION RATE] IN BLOOD: 63 MM/HR

## 2024-06-11 PROCEDURE — 85652 RBC SED RATE AUTOMATED: CPT

## 2024-06-12 ENCOUNTER — PATIENT MESSAGE (OUTPATIENT)
Dept: ORTHOPEDICS CLINIC | Facility: CLINIC | Age: 69
End: 2024-06-12

## 2024-06-12 DIAGNOSIS — R70.0 ELEVATED SED RATE: Primary | ICD-10-CM

## 2024-06-13 NOTE — TELEPHONE ENCOUNTER
LOV 6/4/24  PCP wants to give pt steroid. Pt already educated can stop diclofenac to take the steroid.  Pt wants your recommendation if you think okay to take the steroid order from pcp office for multiple joint arthralgia.

## 2024-06-13 NOTE — TELEPHONE ENCOUNTER
From: Tejal Franz  To: Bjorn POST  Sent: 6/12/2024 4:15 PM CDT  Subject: Need Help Understanding Health Care Plan    I am confused, and am hoping you can help. I had an appointment for a physical with Consuelo Hogue, along with blood work on 5/14/24. At that time Sed Rate was 49. I received a message that I should have it retested, even though I was under your care. I had the retest on 6/11/24, and it is now 63. Received a call from Consuelo Hogue's office advising I should stop taking the Diclofenac, and start on steroids (prednisone, which she would prescribe). She also advised I should see a Rheumatologist because of the elevated Sed Rate. At physical she recommended mammogram and colonoscopy. I indicated I wouldn't do either as I would not do any treatment if anything was found. I had my MRI today. What should I be doing going forward? I would like to understand and hopefully ease pain in legs. I have an appointment with you on Tuesday, and an appointment with Consuelo Hogue on 7/1/24 (hoping to discuss weight loss program since I couldn't get into the clinic until 10/28/24.)

## 2024-06-14 ENCOUNTER — PATIENT MESSAGE (OUTPATIENT)
Dept: FAMILY MEDICINE CLINIC | Facility: CLINIC | Age: 69
End: 2024-06-14

## 2024-06-14 RX ORDER — PREDNISONE 10 MG/1
TABLET ORAL
Qty: 32 TABLET | Refills: 0 | Status: SHIPPED | OUTPATIENT
Start: 2024-06-14

## 2024-06-14 NOTE — TELEPHONE ENCOUNTER
Per lab result, waiting for c/b. MyChart message sent for patient to call office and speak to triage nurse.

## 2024-06-14 NOTE — TELEPHONE ENCOUNTER
See Test result encounter from 6/11/2024.  Patient also asked regarding her appointment with [facility] and wanted to know if they are equivalent to rheumatology.  Informed patient that I am not familiar with the specialist, but it sounds like they are a pain clinic.  Reiterated to patient that Consuelo Hogue recommended Dr. Patterson, Rhueumatology.  Patient voiced understanding and agreed with plan.

## 2024-06-14 NOTE — TELEPHONE ENCOUNTER
From: Tejal Franz  To: Consuelo Hogue  Sent: 6/14/2024 6:10 AM CDT  Subject: Med change and your direction    I received a call from your office indicating you wanted me to stop taking Diclofenal and you would prescribe a steroid. Since the Diclofenal was prescribed by a different doctor, I was confused, but I trust this change is best. Would you please send the prescription to the [facility] in [place]? I will begin taking it tomorrow. Should I get another retest of the Sed Rate? If so, when?    Also, you recommended seeing a Rheumatologist. Since, there were no appointments available with [facility], I would like to make an appointment with [facility] in [place]. Does that sound like a good plan of action?    I have an appointment with you on on 7/1/24, and would like to discuss a weight loss program. It appears my drug plan requires pre authorization for coverage. Is that something you are able to do, or how do I go about doing that? Thanks

## 2024-06-17 NOTE — TELEPHONE ENCOUNTER
We often do not combine because steroids do similar things to the body as NSAIDs, plus more. If she is on steroids ok to hold on NSAIDs. I am not sure why inflammatory markers were ordered, glancing at the chart it looks like she is recommended to see rheumatology for further workup and steroids initiated in meantime, would not be able to comment on her care in that regard. For her back and lower extremity pain this is most consistent with osteoarthritis in my opinion without a rheumatologic cause

## 2024-06-18 ENCOUNTER — PATIENT MESSAGE (OUTPATIENT)
Dept: ORTHOPEDICS CLINIC | Facility: CLINIC | Age: 69
End: 2024-06-18

## 2024-06-18 ENCOUNTER — OFFICE VISIT (OUTPATIENT)
Dept: ORTHOPEDICS CLINIC | Facility: CLINIC | Age: 69
End: 2024-06-18

## 2024-06-18 VITALS — BODY MASS INDEX: 49.47 KG/M2 | HEIGHT: 61.22 IN | WEIGHT: 262 LBS

## 2024-06-18 DIAGNOSIS — M17.12 PRIMARY OSTEOARTHRITIS OF LEFT KNEE: ICD-10-CM

## 2024-06-18 DIAGNOSIS — M54.16 LUMBAR RADICULOPATHY: Primary | ICD-10-CM

## 2024-06-18 DIAGNOSIS — M17.11 PRIMARY OSTEOARTHRITIS OF RIGHT KNEE: ICD-10-CM

## 2024-06-18 DIAGNOSIS — M43.06 LUMBAR SPONDYLOLYSIS: ICD-10-CM

## 2024-06-18 DIAGNOSIS — M16.0 PRIMARY OSTEOARTHRITIS OF BOTH HIPS: ICD-10-CM

## 2024-06-18 PROCEDURE — 99214 OFFICE O/P EST MOD 30 MIN: CPT | Performed by: FAMILY MEDICINE

## 2024-06-18 RX ORDER — GABAPENTIN 300 MG/1
300 CAPSULE ORAL 3 TIMES DAILY
Qty: 90 CAPSULE | Refills: 1 | Status: SHIPPED | OUTPATIENT
Start: 2024-06-18 | End: 2024-06-19

## 2024-06-18 RX ORDER — CYCLOBENZAPRINE HCL 10 MG
10 TABLET ORAL NIGHTLY
Qty: 20 TABLET | Refills: 0 | Status: SHIPPED | OUTPATIENT
Start: 2024-06-18 | End: 2024-07-08

## 2024-06-18 RX ORDER — GABAPENTIN 300 MG/1
300 CAPSULE ORAL NIGHTLY
Qty: 30 CAPSULE | Refills: 1 | Status: SHIPPED | OUTPATIENT
Start: 2024-06-18 | End: 2024-06-18

## 2024-06-18 NOTE — PROGRESS NOTES
Sports Medicine Clinic Note     Subjective:    Chief Complaint   Patient presents with    Follow - Up     FOLLOW UP MEJIA HIP PAIN MRI RESULTS;  WORSE OR BETTER - SAME     Interval History: The patient is a 68-year-old female returning for a follow-up visit. She reports ongoing bilateral hip and knee pain with partial improvement. The back pain has increased in intensity, with radiation down the legs described as sharp, shooting pain, differing from previous complaints. No recent trauma or injury to the back has been reported. She denies bowel or bladder changes but notes longstanding urinary incontinence, which has not changed recently. No additional neurological symptoms reported.    Objective:    Lumbar Spine Examination:    Inspection: Mild lumbar lordosis. No visible deformity or muscle spasm.  Palpation: Tenderness over the lower lumbar spine, especially at L5-S1 level.  Range of Motion: Limited forward flexion, extension, lateral bending, and rotation due to pain.  Neurovascular: Positive straight leg raise test bilaterally.    Diagnostic Tests:    MRI Lumbar Spine (06/12/2024):    Findings:  Mild S-shaped curvature of the lower thoracic to lumbar spine.  Grade 1 retrolisthesis of L4 upon L5.  Grade 1 anterolisthesis of L5 upon S1 with chronic bilateral L5 pars defects.  No acute compression fracture.  Mild height loss of the posterior aspect of the L5 vertebral body.  T1 and T2 hyperintensity at the L5-S1 endplates indicating Modic type II degenerative changes.  T1 and T2 hyperintensities in T12 and L1 vertebral bodies consistent with hemangiomas.  Disc desiccation across intervertebral discs.  No significant spinal stenosis.  Severe left and moderate right neural foraminal encroachment at L5-S1.  Minimal to mild neural foraminal encroachment at L2-3 and L3-4.  Mild bilateral neural foraminal encroachment at L4-5.    Assessment:    Lumbar radiculopathy secondary to multilevel lumbar spondylosis and  degenerative disc disease.  Grade 1 retrolisthesis of L4 on L5.  Grade 1 degenerative anterolisthesis of L5 on S1 with chronic L5 pars defects.  Bilateral hip and knee pain secondary to osteoarthritis.  Obesity (BMI 45.0-49.9).    Plan:    Procedures:  Refer to pain management for potential epidural steroid injections or other interventional pain procedures.    Therapy:  Continue physical therapy focusing on lumbar stabilization exercises and strengthening of core and lower extremity muscles.  Specific exercises to alleviate radicular symptoms. She feels pain is limiting her currently and would like to hold off on PT until pain management consultation.    Medications:  Start trial of gabapentin 300 mg nightly for neuropathic pain management.  Continue Tylenol and NSAIDs as needed for pain control.  Prescribe lidocaine patches for localized pain relief.    Activity Recommendations:  Encourage weight loss and refer to a weight loss clinic for further management.  Advise avoiding prolonged standing or walking and high-impact activities.  Recommend gentle activities to maintain joint function and mobility.    Follow-Up:  Tentative follow-up in 4 weeks to assess response to gabapentin, plan to titrate to affect. Instruct the patient to return earlier if symptoms worsen or new symptoms develop. Red flag precautions reiterated.        Bjorn Ramirez DO, CAQSM   Primary Care Sports Medicine    Department of Orthopaedic Surgery  [facility]    [address] [place] [address]  [address] [place] [id number]    t: [phone]  f: [phone]      [email or web address]

## 2024-06-18 NOTE — TELEPHONE ENCOUNTER
From: Tejal Franz  To: Bjorn POST  Sent: 6/18/2024 12:19 PM CDT  Subject: Tylenol    You prescribed two medications. I forgot to ask if I continue to take the Tylenol with them. None of these have issues while taking Prednisone, do they?    Once the Prednisone is to finished, you indicated I should go back on the Diflonac. I only have one refill remaining. I will contact you when I need a refill. How long can I stay on this? Any concern with Sed Rate?

## 2024-06-19 RX ORDER — GABAPENTIN 300 MG/1
300 CAPSULE ORAL NIGHTLY
Qty: 30 CAPSULE | Refills: 1 | Status: SHIPPED | OUTPATIENT
Start: 2024-06-19 | End: 2024-08-18

## 2024-06-19 RX ORDER — GABAPENTIN 300 MG/1
300 CAPSULE ORAL 3 TIMES DAILY
Qty: 90 CAPSULE | Refills: 1 | Status: SHIPPED | OUTPATIENT
Start: 2024-06-26 | End: 2024-08-25

## 2024-06-19 NOTE — TELEPHONE ENCOUNTER
Safe to take with tylenol, no issues    Will refill diclofenac as well, safe long term but definitely goal to wean off eventually    No acute concern for sed rate it is not severely elevated. I can't comment on that much as I did not order/am not managing that I can say very low concern for infection or autoimmune process on my end.

## 2024-06-19 NOTE — TELEPHONE ENCOUNTER
From: Tejal Franz  To: Bjorn POST  Sent: 6/18/2024 6:03 PM CDT  Subject: New Meds    Me again. I thought you said to take the Gabapentin 3 times a day, but the prescription says 1 capsule every night. Which is correct?    Also, to confirm, I should only take the Cyclobenzaprine (1 at night) if I get no relief from the Gabapentin. Or, did I misunderstand and I should take both at the same time? (Getting old is not easy.)

## 2024-06-19 NOTE — TELEPHONE ENCOUNTER
Do you want her to do nightly or 3 times a day as the order is written?  Please clarify.  Thank you!        LOV 6/18/24  \"Medications:  Start trial of gabapentin 300 mg nightly for neuropathic pain management.  Continue Tylenol and NSAIDs as needed for pain control.  Prescribe lidocaine patches for localized pain relief.\"      gabapentin 300 MG Oral Cap 90 capsule 1 6/18/2024 8/17/2024   Sig:   Take 1 capsule (300 mg total) by mouth in the morning, at noon, and at bedtime.     Route:   Oral       cyclobenzaprine 10 MG Oral Tab 20 tablet 0 6/18/2024 7/8/2024   Sig:   Take 1 tablet (10 mg total) by mouth nightly for 20 days.     Route:   Oral

## 2024-06-19 NOTE — TELEPHONE ENCOUNTER
Spoke with patient to let her know that Dr Ramirez would like her to start with 300mg gabapentin nightly, then we will titrate her up.  Patient will touch base with us in about a week to let her know how she is doing, then the plan is to increase the dose.  Patient verbalized understanding.  Spoke with Elia at [facility] to let him know the plan.

## 2024-06-20 ENCOUNTER — PATIENT MESSAGE (OUTPATIENT)
Dept: ORTHOPEDICS CLINIC | Facility: CLINIC | Age: 69
End: 2024-06-20

## 2024-06-20 NOTE — TELEPHONE ENCOUNTER
From: Tejal Franz  To: Bjorn POST  Sent: 6/20/2024 12:07 PM CDT  Subject: Pain    My pain has gotten progressively worse since Ms. Hogue put me on a daily decreasing amount of Prednisone. I left a message for her, but she is out of the office until Monday. The new meds haven't helped at all yet. What should I do? Could barely get out of bed this morning, and can't imagine how bad it will be by Monday. Should I just go back on the Diflonac? Took 2 Prednisone this morning. Please help.

## 2024-06-26 ENCOUNTER — LAB ENCOUNTER (OUTPATIENT)
Dept: LAB | Age: 69
End: 2024-06-26
Attending: NURSE PRACTITIONER

## 2024-06-26 DIAGNOSIS — R70.0 ELEVATED SED RATE: ICD-10-CM

## 2024-06-26 LAB — ERYTHROCYTE [SEDIMENTATION RATE] IN BLOOD: 47 MM/HR

## 2024-06-26 PROCEDURE — 85652 RBC SED RATE AUTOMATED: CPT

## 2024-06-26 NOTE — TELEPHONE ENCOUNTER
Pt unable to get inj until 08.2024 due to recent injection    Pt unable to come on Tuesdays due to prior appts and auth exp 09.04.24    Is it possible to extend one more day as pt is scheduled for #3 inj on 09.05.24?

## 2024-06-28 ENCOUNTER — OFFICE VISIT (OUTPATIENT)
Dept: PAIN CLINIC | Facility: CLINIC | Age: 69
End: 2024-06-28

## 2024-06-28 ENCOUNTER — TELEPHONE (OUTPATIENT)
Dept: PAIN CLINIC | Facility: CLINIC | Age: 69
End: 2024-06-28

## 2024-06-28 VITALS
DIASTOLIC BLOOD PRESSURE: 80 MMHG | HEART RATE: 71 BPM | SYSTOLIC BLOOD PRESSURE: 110 MMHG | OXYGEN SATURATION: 97 % | BODY MASS INDEX: 50 KG/M2 | WEIGHT: 269 LBS

## 2024-06-28 DIAGNOSIS — M54.16 LUMBAR RADICULITIS: Primary | ICD-10-CM

## 2024-06-28 PROCEDURE — 99204 OFFICE O/P NEW MOD 45 MIN: CPT | Performed by: ANESTHESIOLOGY

## 2024-06-28 NOTE — PATIENT INSTRUCTIONS
Refill policies:    Allow 2-3 business days for refills; controlled substances may take longer.  Contact your pharmacy at least 5 days prior to running out of medication and have them send an electronic request or submit request through the “request refill” option in your [employer] account.  Refills are not addressed on weekends; covering physicians do not authorize routine medications on weekends.  No narcotics or controlled substances are refilled after noon on Fridays or by on call physicians.  By law, narcotics must be electronically prescribed.  A 30 day supply with no refills is the maximum allowed.  If your prescription is due for a refill, you may be due for a follow up appointment.  To best provide you care, patients receiving routine medications need to be seen at least once a year.  Patients receiving narcotic/controlled substance medications need to be seen at least once every 3 months.  In the event that your preferred pharmacy does not have the requested medication in stock (e.g. Backordered), it is your responsibility to find another pharmacy that has the requested medication available.  We will gladly send a new prescription to that pharmacy at your request.    Scheduling Tests:    If your physician has ordered radiology tests such as MRI or CT scans, please contact Central Scheduling at [phone] right away to schedule the test.  Once scheduled, the [facility] Centralized Referral Team will work with your insurance carrier to obtain pre-certification or prior authorization.  Depending on your insurance carrier, approval may take 3-10 days.  It is highly recommended patients assure they have received an authorization before having a test performed.  If test is done without insurance authorization, patient may be responsible for the entire amount billed.      Precertification and Prior Authorizations:  If your physician has recommended that you have a procedure or additional testing performed the [facility]  Centralized Referral Team will contact your insurance carrier to obtain pre-certification or prior authorization.    You are strongly encouraged to contact your insurance carrier to verify that your procedure/test has been approved and is a COVERED benefit.  Although the [facility] Centralized Referral Team does its due diligence, the insurance carrier gives the disclaimer that \"Although the procedure is authorized, this does not guarantee payment.\"    Ultimately the patient is responsible for payment.   Thank you for your understanding in this matter.  Paperwork Completion:  If you require FMLA or disability paperwork for your recovery, please make sure to either drop it off or have it faxed to our office at [phone]. Be sure the form has your name and date of birth on it.  The form will be faxed to our Forms Department and they will complete it for you.  There is a 25$ fee for all forms that need to be filled out.  Please be aware there is a 10-14 day turnaround time.  You will need to sign a release of information (NIKOLAS) form if your paperwork does not come with one.  You may call the Forms Department with any questions at [phone].  Their fax number is [phone].  Refill policies:    Allow 2-3 business days for refills; controlled substances may take longer.  Contact your pharmacy at least 5 days prior to running out of medication and have them send an electronic request or submit request through the “request refill” option in your [employer] account.  Refills are not addressed on weekends; covering physicians do not authorize routine medications on weekends.  No narcotics or controlled substances are refilled after noon on Fridays or by on call physicians.  By law, narcotics must be electronically prescribed.  A 30 day supply with no refills is the maximum allowed.  If your prescription is due for a refill, you may be due for a follow up appointment.  To best provide you care, patients receiving routine  medications need to be seen at least once a year.  Patients receiving narcotic/controlled substance medications need to be seen at least once every 3 months.  In the event that your preferred pharmacy does not have the requested medication in stock (e.g. Backordered), it is your responsibility to find another pharmacy that has the requested medication available.  We will gladly send a new prescription to that pharmacy at your request.    Scheduling Tests:    If your physician has ordered radiology tests such as MRI or CT scans, please contact Central Scheduling at [phone] right away to schedule the test.  Once scheduled, the [facility] Centralized Referral Team will work with your insurance carrier to obtain pre-certification or prior authorization.  Depending on your insurance carrier, approval may take 3-10 days.  It is highly recommended patients assure they have received an authorization before having a test performed.  If test is done without insurance authorization, patient may be responsible for the entire amount billed.      Precertification and Prior Authorizations:  If your physician has recommended that you have a procedure or additional testing performed the [facility] Centralized Referral Team will contact your insurance carrier to obtain pre-certification or prior authorization.    You are strongly encouraged to contact your insurance carrier to verify that your procedure/test has been approved and is a COVERED benefit.  Although the [facility] Centralized Referral Team does its due diligence, the insurance carrier gives the disclaimer that \"Although the procedure is authorized, this does not guarantee payment.\"    Ultimately the patient is responsible for payment.   Thank you for your understanding in this matter.  Paperwork Completion:  If you require FMLA or disability paperwork for your recovery, please make sure to either drop it off or have it faxed to our office at [phone]. Be sure the form has your name  and date of birth on it.  The form will be faxed to our Forms Department and they will complete it for you.  There is a 25$ fee for all forms that need to be filled out.  Please be aware there is a 10-14 day turnaround time.  You will need to sign a release of information (NIKOLAS) form if your paperwork does not come with one.  You may call the Forms Department with any questions at [phone].  Their fax number is [phone].

## 2024-06-28 NOTE — H&P
Name: Tejal Franz   : 1955   DOS: 2024     Chief complaint: Low back     History of present illness:  Tejal Franz is a 68 year old female with a history of obesity, who presents today for evaluation of low back pain with bilateral lower EXTR radicular symptoms.  The patient was cleaning her home in 2024.  After which, she began experiencing sharp pain with throbbing and tingling down both legs.  Initially pain was 10 out of 10.  This is made worse by standing and walking.  Patient completed physical therapy and nonsteroidals.  Also had knee and hip injections without improvement.  Recently prescribed cyclobenzaprine and diclofenac which did lead to some symptom improvement.      She denies any chills, fever or weakness. She denies any bladder or bowel incontinence.      Past Medical History:    Depression    Obesity, unspecified      Current Outpatient Medications   Medication Sig Dispense Refill    gabapentin 300 MG Oral Cap Take 1 capsule (300 mg total) by mouth nightly. 30 capsule 1    gabapentin 300 MG Oral Cap Take 1 capsule (300 mg total) by mouth in the morning, at noon, and at bedtime. 90 capsule 1    cyclobenzaprine 10 MG Oral Tab Take 1 tablet (10 mg total) by mouth nightly for 20 days. 20 tablet 0    diclofenac 75 MG Oral Tab EC Take 1 tablet (75 mg total) by mouth 2 (two) times daily. 28 tablet 1    Acetaminophen Extra Strength 500 MG Oral Tab       escitalopram (LEXAPRO) 20 MG Oral Tab Take 1 tablet (20 mg total) by mouth daily. 90 tablet 1    Calcium Carbonate-Vitamin D 600-400 MG-UNIT Oral Tab Take 1 tablet by mouth daily.      Multiple Vitamins-Minerals (CENTRUM SILVER) Oral Tab Take 1 tablet by mouth daily.       Past Surgical History:   Procedure Laterality Date          Other surgical history      rectovag fistula    Other surgical history      gum surgery    Other surgical history      wisdom tooth      Family History   Problem Relation Age of Onset    Heart  Disorder Father         pacemaker    Other (Other) Father         COPD    Heart Disorder Mother         MI; smoker     Social History     Socioeconomic History    Marital status:     Number of children: 2   Occupational History    Occupation: office work   Tobacco Use    Smoking status: Former     Current packs/day: 0.00     Average packs/day: 1.8 packs/day for 32.0 years (57.6 ttl pk-yrs)     Types: Cigarettes     Start date: 1972     Quit date: 2004     Years since quittin.5    Smokeless tobacco: Never    Tobacco comments:     quit about 8 years ago     Updated 24   Vaping Use    Vaping status: Never Used   Substance and Sexual Activity    Alcohol use: Yes     Comment: rare    Drug use: No    Sexual activity: Not Currently   Other Topics Concern    Caffeine Concern Yes     Comment: 2 cups every morning    Exercise No    Seat Belt Yes       Review of  other systems:  10 point ROS otherwise negative    Physical examination: Tejal is a 68 year old female not in acute distress  /80 (BP Location: Left arm, Patient Position: Sitting)   Pulse 71   Wt 269 lb (122 kg)   SpO2 97%   BMI 50.46 kg/m²    The patient is awake, alert, oriented and corporative. She has a normal affect. The patient ambulates with normal gait.  HEENT: No gross lesion noted. PEERL. No icterus.  Neck and Upper Extremity: Supple. No thyromegaly or lymphadenopathy.     Motor Examination:    (R)   (L)  Deltoid:      5    5  Biceps:       5    5  Triceps:      5    5  Wrist Extension:     5    5  Wrist Flexsion:     5    5  Finger Extension:     5    5  Finger Flexsion:     5    5       Cardiovascular system: Peripheral edema  Respiratory system: Speech nonlabored  Abdomen: Soft   Neurologic:  Cranial nerves II through XII are grossly intact.       Examination of the lower back:       Gait intact with cane   Motor Examination:   (R)   (L)   Hip Abduction:   5    5   Hip Flexion:    5    5   Knee Extension:   5     5   Knee Flexion:    5    5   Ant. Tibialis:    5    5  Extensor Hallucis Longus:   5    5  Peroneals:     5    5  Gastrocsoleus:     5    5       Radiology diagnostic studies:   Lumbar MRI reviewed and discussed with patient.  Evidence of grade 1 anterior listhesis of L5.  This leads to severe left and moderate right foraminal stenosis    Assessment:  Encounter Diagnosis   Name Primary?    Lumbar radiculitis Yes   .      Plan:     The patient is very pleasant 68-year-old female with a history of obesity.  She was doing well until April when she began experiencing bilateral lower extremity radicular symptoms.  Was treated conservatively with mild improvement.  Has imaging with evidence of degenerative changes and foraminal stenosis.  This is concordant with her radicular pattern.  Recommended trial of transforaminal epidural steroid injection.    1.  This is to affirm that the patient is part of a home exercise program, formal physical therapy, or functional rehabilitation program.  Patient has completed this within the last 6 weeks.      2.  Patient also is limited in terms of overall daily function by pain symptoms.      3.  Additionally, patient has failed 6 weeks of physician directed conservative management including medications such as nonsteroidals (tylenol, Advil), patient medications and muscle relaxers.    4.  Patient is also part of a comprehensive pain management program.  This includes pain education, social/behavioral health support, access to physical therapy, medications, interventions, and appropriate surgical referrals if necessary.         Blaze Gudio MD MPH  Pain Management

## 2024-06-28 NOTE — TELEPHONE ENCOUNTER
Patient advised of insurance approval to proceed with injections and is agreeable to scheduling. Patient scheduled for procedure, pre-procedure instructions reviewed. Patient prefers Local sedation. Reviewed sedation instructions including No Fasting & No  Required. Patient encouraged to hold diclofenac for 24 hours prior to procedure. Patient verbalized understanding of instructions, no further needs at this time.    Patient asked if she can continue taking gabapentin and cyclobenzaprine and if she would continue getting refills from ?   --Informed patient that  is prescribing provider for those medications, therefore if she needs refills would need to contact their office. Also informed patient that she can continue taking medications if needed after injection, she would need to contact  office to update on gabapentin as this medication will need instructions on weaning off. Patient VU.       Pre Procedure Instruction Sheet & Hospital Map provided to patient at office visit.       [facility]  PRE-PROCEDURE INSTRUCTIONS WITHOUT SEDATION    Procedure: MARGAUX       Appointment Date: 07/16/2024  Check-In Time: 10:30 AM    Follow-Up Date/Time w/ : 07/31/2024 @ 10:30 AM    Prior to the procedure:  Please update us prior to the procedure if you are experiencing any symptoms of infection such as cough, fever, chills, urinary symptoms, or have recently been prescribed antibiotics, have open wounds, have recently had surgery or dental procedures.    Day of Procedure:  **Drivers will be required for patients who receive prescriptions for Valium.    NO FASTING REQUIRED  Please bring your Insurance Card, Photo ID, List of Current Medications and Referral (if applicable) to your appointment.  Please park in the [facility] and follow the signs to the [facility] [place].  Check in at [facility] ([address] [place] [address]) outpatient registration in the  south lobby.  Please note-No prescriptions will be written by Pain Clinic in OR on the day of procedure. If you require a refill of medications, please contact the office 48 hours prior to your procedure.  If you have an implanted Spinal Cord or Peripheral Nerve Stimulator: Please remember to turn device off for procedure.        Medication Hold:    Number of days you need to be off for the following medications:    Aggrenox 10 days   Agrylin (Anagrelide) 10 days  Brilinta (Ticagrelor) 7 days  Imbruvica (Ibrutinib) 3 days   Enbrel (Etanercept) 24 hours   Fragmin (Dalteparin) 24 hours   Pletal (Cilostazol) 7 days  Effient (Prasugrel) 7 days  Pradaxa 10 days  Trental 7 days  Eliquis (Apixaban) 3 days  Xarelto (Rivaroxaban) 3 days  Lovenox (Enoxaparin) 24 hours  Aspirin  Greater than 81mg but less than 325mg   5 days  325mg and greater                  7 days  Coumadin       5 days  Procedure may be cancelled if INR is elevated.   Excedrin (with aspirin) 7 days  Plavix (Clopidogrel)                            7 days    NSAIDs: 24 hours preferred      Ibuprofen (Motrin, Advil, Vicoprofen), Naproxen (Naprosyn, Aleve), Piroxcam (Feldene), Meloxicam (Mobic), Oxaprozin (Daypro), Diclofenac (Voltaren), Indomethacin (Indocin), Etodolac (Lodine), Nabumetone (Relafen), Celebrex (Celecoxib)           HERBAL SUPPLEMENTS  5 days preferred  Fish oil, krill oil, Omega-3, Vascepa, Vitamin E, Turmeric, Garlic                       Insurance Authorization:   Most insurances are now requiring a preauthorization for all procedures.  In the event that your insurance does not authorize your procedure within 48 hours of the scheduled date, your procedure will be cancelled and rescheduled to a later date.  Please contact your insurance carrier to determine what your financial responsibility will be for the procedure(s).      Cancellation/Rescheduling Appointment:   In the event you need to cancel or reschedule your appointment, you must  notify the office 24 hours prior.    Post-procedure instructions:        Please schedule a follow up visit within 2 to 4 weeks after your last procedure date   Please call our office with any questions or concerns before or after your procedure at  [phone].  If you are a diabetic, please increase the frequency of your glucose monitoring after the procedure as this may cause a temporary increase in your blood sugar.  Contact your primary care physician if your blood sugar rises as you may require some medication adjustment.  It is normal to have increased pain at injection site for up to 3-5 days after procedure, you can use heat or ice (20 minutes on 20 minutes off) for comfort.    **To hear a recorded version of these instructions, please call [phone] and follow the prompts.  **Para escuchar las instrucciones en Español, por favor de llamar el jimy [phone] opción 4.

## 2024-06-28 NOTE — PROGRESS NOTES
Subjective:   Patient ID: Tejal Franz is a 68 year old female.    Consult        History/Other:   Review of Systems  Current Outpatient Medications   Medication Sig Dispense Refill    gabapentin 300 MG Oral Cap Take 1 capsule (300 mg total) by mouth nightly. 30 capsule 1    gabapentin 300 MG Oral Cap Take 1 capsule (300 mg total) by mouth in the morning, at noon, and at bedtime. 90 capsule 1    cyclobenzaprine 10 MG Oral Tab Take 1 tablet (10 mg total) by mouth nightly for 20 days. 20 tablet 0    predniSONE 10 MG Oral Tab Prednisone 60 mg x 3 days, then 40 mg x 2 days, 20 mg x 2 days, 10 mg x 2 days.  NO NSAID while on course of steroid. Take medication with food. 32 tablet 0    lidocaine (HM LIDOCAINE PATCH) 4 % External Patch Place 1 patch onto the skin daily. 10 patch 0    diclofenac 75 MG Oral Tab EC Take 1 tablet (75 mg total) by mouth 2 (two) times daily. 28 tablet 1    Acetaminophen Extra Strength 500 MG Oral Tab       escitalopram (LEXAPRO) 20 MG Oral Tab Take 1 tablet (20 mg total) by mouth daily. 90 tablet 1    Calcium Carbonate-Vitamin D 600-400 MG-UNIT Oral Tab Take 1 tablet by mouth daily.      Multiple Vitamins-Minerals (CENTRUM SILVER) Oral Tab Take 1 tablet by mouth daily.       Allergies:No Known Allergies    Objective:   Physical Exam    Assessment & Plan:   No diagnosis found.    No orders of the defined types were placed in this encounter.      Meds This Visit:  Requested Prescriptions      No prescriptions requested or ordered in this encounter       Imaging & Referrals:  None    Location of Pain: Bilateral leg    Date Pain Began: 4/14/204          Work Related:   No        Receiving Work Comp/Disability:   No    Numeric Rating Scale:  Pain at Present:  3                                                                                                            (No Pain) 0  to  10 (Worst Pain)                 Minimum Pain:   1  Maximum Pain  10    Distribution of Pain:     bilateral    Quality of Pain:   aching, numbness, sharp/stabbing, throbbing, and tingling    Origin of Pain:    Degenerative    Aggravating Factors:    Standing and Walking    Past Treatments for Current Pain Condition:   Physical Therapy, NSAIDS, and Other injections    Prior diagnostic testing for your pain:  xray mri

## 2024-06-28 NOTE — TELEPHONE ENCOUNTER
Order Questions    Question Answer   Anesthesia Type Local   Provider [facility]   Procedure Lumbar ANGELA   CPT (Hit enter after each entry) NJX INTERLAMINAR LMBR/SAC   Medical clearance requested (will send to Pain Navigator) No   Patient has Medicare coverage? Yes   Comments (Please list entire procedure name here.) lumbar epidural steroid injection

## 2024-07-01 ENCOUNTER — OFFICE VISIT (OUTPATIENT)
Dept: FAMILY MEDICINE CLINIC | Facility: CLINIC | Age: 69
End: 2024-07-01
Payer: MEDICARE

## 2024-07-01 VITALS
SYSTOLIC BLOOD PRESSURE: 128 MMHG | BODY MASS INDEX: 50.63 KG/M2 | RESPIRATION RATE: 18 BRPM | WEIGHT: 268.19 LBS | DIASTOLIC BLOOD PRESSURE: 70 MMHG | TEMPERATURE: 97 F | OXYGEN SATURATION: 95 % | HEART RATE: 73 BPM | HEIGHT: 61 IN

## 2024-07-01 DIAGNOSIS — E66.01 OBESITY, MORBID, BMI 50 OR HIGHER (HCC): ICD-10-CM

## 2024-07-01 DIAGNOSIS — R73.03 PREDIABETES: Primary | ICD-10-CM

## 2024-07-01 DIAGNOSIS — M25.50 ARTHRALGIA OF MULTIPLE JOINTS: ICD-10-CM

## 2024-07-01 DIAGNOSIS — R70.0 ELEVATED SED RATE: ICD-10-CM

## 2024-07-01 PROCEDURE — 99214 OFFICE O/P EST MOD 30 MIN: CPT | Performed by: NURSE PRACTITIONER

## 2024-07-01 PROCEDURE — G2211 COMPLEX E/M VISIT ADD ON: HCPCS | Performed by: NURSE PRACTITIONER

## 2024-07-01 RX ORDER — METFORMIN HYDROCHLORIDE 500 MG/1
500 TABLET, EXTENDED RELEASE ORAL
Qty: 30 TABLET | Refills: 0 | Status: SHIPPED | OUTPATIENT
Start: 2024-07-01

## 2024-07-01 NOTE — PROGRESS NOTES
Tejal Franz is a 68 year old female.  HPI:   Follow up. Would like to discuss options for weight loss. Patient has checked with her insurance and reports they do not cover Wegovy or Zepbound. She is pre-diabetic with an A1c of 5.8%.   Has appt with the weight loss clinic in October.   + elevated sed rate. Multiple joint arthralgias. No headaches. Has upcoming appt with pain management. Was seeing Dr. Hopkins. Recently started on Gabapentin. Does feel it has been helping. Has appt with rheumatology. No new/worsening symptoms.     Wt Readings from Last 6 Encounters:   07/01/24 268 lb 3.2 oz (121.7 kg)   06/28/24 269 lb (122 kg)   06/18/24 262 lb (118.8 kg)   06/04/24 262 lb (118.8 kg)   05/16/24 262 lb (118.8 kg)   05/14/24 262 lb (118.8 kg)     Current Outpatient Medications   Medication Sig Dispense Refill    gabapentin 300 MG Oral Cap Take 1 capsule (300 mg total) by mouth nightly. 30 capsule 1    gabapentin 300 MG Oral Cap Take 1 capsule (300 mg total) by mouth in the morning, at noon, and at bedtime. 90 capsule 1    cyclobenzaprine 10 MG Oral Tab Take 1 tablet (10 mg total) by mouth nightly for 20 days. 20 tablet 0    diclofenac 75 MG Oral Tab EC Take 1 tablet (75 mg total) by mouth 2 (two) times daily. 28 tablet 1    Acetaminophen Extra Strength 500 MG Oral Tab       escitalopram (LEXAPRO) 20 MG Oral Tab Take 1 tablet (20 mg total) by mouth daily. 90 tablet 1    Calcium Carbonate-Vitamin D 600-400 MG-UNIT Oral Tab Take 1 tablet by mouth daily.      Multiple Vitamins-Minerals (CENTRUM SILVER) Oral Tab Take 1 tablet by mouth daily.        Past Medical History:    Arthritis    First Treatment by Dr. Prince    Depression    Obesity, unspecified      Social History:  Social History     Socioeconomic History    Marital status:     Number of children: 2   Occupational History    Occupation: office work   Tobacco Use    Smoking status: Former     Current packs/day: 0.00     Average packs/day: 1.8  packs/day for 32.0 years (57.6 ttl pk-yrs)     Types: Cigarettes     Start date: 1972     Quit date: 2004     Years since quittin.5    Smokeless tobacco: Never    Tobacco comments:     quit about 8 years ago     Updated 24   Vaping Use    Vaping status: Never Used   Substance and Sexual Activity    Alcohol use: Yes     Alcohol/week: 2.0 standard drinks of alcohol     Types: 2 Glasses of wine per week     Comment: I only have alcohol occasionally.    Drug use: Never    Sexual activity: Not Currently   Other Topics Concern    Caffeine Concern No    Stress Concern No    Weight Concern Yes    Special Diet No    Exercise No    Seat Belt No        REVIEW OF SYSTEMS:   GENERAL HEALTH: feels well otherwise  RESPIRATORY: denies shortness of breath with exertion  CARDIOVASCULAR: denies chest pain on exertion  GI: denies abdominal pain  NEURO: denies headaches  EXAM:   /70   Pulse 73   Temp 97.2 °F (36.2 °C) (Temporal)   Resp 18   Ht 5' 1\" (1.549 m)   Wt 268 lb 3.2 oz (121.7 kg)   SpO2 95%   BMI 50.68 kg/m²   GENERAL: well developed, well nourished,in no apparent distress  HEENT: TM clear bilaterally,  throat clear no erythema without mass.   EYES: sclera clear without injection  NECK: supple,no adenopathy, thyroid normal to palpation  LUNGS: clear to auscultation no rales, rhonchi or wheezes  CARDIO: RRR without murmur  GI: Normal bowel sounds ×4 quadrant, no hepatosplenomegaly or masses, and no tenderness   EXTREMITIES: no cyanosis, clubbing or edema  Musculoskeletal: No gross deficit  Neurological: nerves II through XII grossly intact no sensorimotor deficit  Psychological: Mood and affect are normal.  Good communication skills.  ASSESSMENT AND PLAN:     Encounter Diagnoses   Name Primary?    Prediabetes Yes    Obesity, morbid, BMI 50 or higher (HCC)     Elevated sed rate     Arthralgia of multiple joints        Orders Placed This Encounter   Procedures    Sed Rate, Domingo (Automated) [E]        Meds & Refills for this Visit:  Requested Prescriptions     Signed Prescriptions Disp Refills    metFORMIN  MG Oral Tablet 24 Hr 30 tablet 0     Sig: Take 1 tablet (500 mg total) by mouth daily with breakfast.       Imaging & Consults:  None    Follow Up with:  No follow-up provider specified.    1. Prediabetes  - metFORMIN  MG Oral Tablet 24 Hr; Take 1 tablet (500 mg total) by mouth daily with breakfast.  Dispense: 30 tablet; Refill: 0    2. Obesity, morbid, BMI 50 or higher (HCC)  - metFORMIN  MG Oral Tablet 24 Hr; Take 1 tablet (500 mg total) by mouth daily with breakfast.  Dispense: 30 tablet; Refill: 0    3. Elevated sed rate  - Sed Rate, Westergren (Automated) [E]; Future    4. Arthralgia of multiple joints      Metformin as ordered. Discussed medication, indication and potential side effects of medication with the patient.  Focus on lower fat/lower carbohydrate diet, routine exercise, weight loss. Body mass index is 50.68 kg/m²..  Continue scheduled appt with weight loss clinic.  Sed rate decreased with short-term steroid. Discussed follow up with rheum given this non-specific finding, multiple joint arthralgias. No s/s of infection.  Patient declines any cancer screenings including colon, breast, cervical cancer. She reports that, \"if I were to have cancer, I do not want any treatment\"- she is aware of the risks associated with not completing testing.     The patient indicates understanding of these issues and agrees to the plan.  The patient is asked to return in 6 wks.

## 2024-07-05 DIAGNOSIS — M17.11 PRIMARY OSTEOARTHRITIS OF RIGHT KNEE: ICD-10-CM

## 2024-07-05 DIAGNOSIS — M17.12 PRIMARY OSTEOARTHRITIS OF LEFT KNEE: ICD-10-CM

## 2024-07-08 RX ORDER — CYCLOBENZAPRINE HCL 10 MG
10 TABLET ORAL NIGHTLY
Qty: 30 TABLET | Refills: 0 | Status: SHIPPED | OUTPATIENT
Start: 2024-07-08 | End: 2024-07-31

## 2024-07-08 RX ORDER — DICLOFENAC SODIUM 75 MG/1
75 TABLET, DELAYED RELEASE ORAL 2 TIMES DAILY
Qty: 60 TABLET | Refills: 1 | Status: SHIPPED | OUTPATIENT
Start: 2024-07-08 | End: 2024-09-04

## 2024-07-08 NOTE — TELEPHONE ENCOUNTER
DOS: N/A  Last OV: 06/18/24    Diclofenac 75 mg  Last refill date: 06/03/24     #/refills: 28/1    Cyclobenzaprine 10 mg  Last refill date: 06/18/24     #/refills: 20/0    Upcoming appt:   Future Appointments   Date Time Provider Department Center   7/31/2024 10:30 AM Blaze Guido MD [facility] [place]   8/15/2024 10:00 AM Bjorn Ramirez, DO [facility]   8/22/2024 10:00 AM Bjorn Ramirez, DO [facility]   8/27/2024 12:30 PM Shailesh Valadez APRN [facility]   8/29/2024 10:00 AM Bjorn Ramirez, DO [facility]   9/4/2024 12:15 PM Omsany Ptaterson MD [id number] [place]   9/5/2024 10:00 AM Bjorn Ramirez, DO [facility]   9/9/2024 11:30 AM Consuelo Hogue APRN [id number]   10/28/2024  1:20 PM Eva Springer APRN [id number]       05/14/24  BUN  9 - 23 mg/dL 27 High    Creatinine  0.55 - 1.02 mg/dL 0.85     eGFR-Cr  >=60 mL/min/1.73m2 74

## 2024-07-12 ENCOUNTER — PATIENT MESSAGE (OUTPATIENT)
Dept: ORTHOPEDICS CLINIC | Facility: CLINIC | Age: 69
End: 2024-07-12

## 2024-07-12 NOTE — TELEPHONE ENCOUNTER
Gabapentin    - Patient states medication is wearing off.  Please advise on increasing the dose.    LOV: 6/18/24  Per LOV:  Tentative follow-up in 4 weeks to assess response to gabapentin, plan to titrate to affect. Instruct the patient to return earlier

## 2024-07-12 NOTE — TELEPHONE ENCOUNTER
From: Tejal Franz  To: Bjorn POST  Sent: 7/12/2024 9:32 AM CDT  Subject: Gabapentin Prescription    You prescribed two different prescriptions for Gabapentin. The dosage for the first was once a day. The second was three times a day. [facility] only filled the once a day. I don't know if the prescription for three times a day was entered for a future date to be filled. I am almost out of the once per day, and not sure what I should take moving forward. I initially got significant relief from the two new prescriptions. I don't know if you build up a tolerance to them, because I'm not seeing the same level of relief. Which should I now be taking?

## 2024-07-14 RX ORDER — GABAPENTIN 300 MG/1
300 CAPSULE ORAL 3 TIMES DAILY
Qty: 270 CAPSULE | Refills: 1 | Status: SHIPPED | OUTPATIENT
Start: 2024-07-14 | End: 2025-01-10

## 2024-07-15 NOTE — TELEPHONE ENCOUNTER
Sounds like she hasn't tritrated up to TID, would go to 300 mg TID first from 300 mg nightly and then follow up to discuss going up further

## 2024-07-16 ENCOUNTER — HOSPITAL ENCOUNTER (OUTPATIENT)
Facility: HOSPITAL | Age: 69
Setting detail: HOSPITAL OUTPATIENT SURGERY
Discharge: HOME | End: 2024-07-16
Attending: ANESTHESIOLOGY | Admitting: ANESTHESIOLOGY
Payer: MEDICARE

## 2024-07-16 ENCOUNTER — APPOINTMENT (OUTPATIENT)
Dept: GENERAL RADIOLOGY | Facility: HOSPITAL | Age: 69
End: 2024-07-16
Attending: ANESTHESIOLOGY
Payer: MEDICARE

## 2024-07-16 VITALS
DIASTOLIC BLOOD PRESSURE: 86 MMHG | OXYGEN SATURATION: 95 % | RESPIRATION RATE: 18 BRPM | SYSTOLIC BLOOD PRESSURE: 159 MMHG | TEMPERATURE: 97 F | HEART RATE: 75 BPM

## 2024-07-16 LAB — GLUCOSE BLD-MCNC: 98 MG/DL (ref 70–99)

## 2024-07-16 PROCEDURE — 3E0R33Z INTRODUCTION OF ANTI-INFLAMMATORY INTO SPINAL CANAL, PERCUTANEOUS APPROACH: ICD-10-PCS | Performed by: ANESTHESIOLOGY

## 2024-07-16 PROCEDURE — 62323 NJX INTERLAMINAR LMBR/SAC: CPT | Performed by: ANESTHESIOLOGY

## 2024-07-16 RX ORDER — DEXAMETHASONE SODIUM PHOSPHATE 10 MG/ML
INJECTION, SOLUTION INTRAMUSCULAR; INTRAVENOUS
Status: DISCONTINUED | OUTPATIENT
Start: 2024-07-16 | End: 2024-07-16

## 2024-07-16 RX ORDER — SODIUM CHLORIDE 9 MG/ML
INJECTION, SOLUTION INTRAMUSCULAR; INTRAVENOUS; SUBCUTANEOUS
Status: DISCONTINUED | OUTPATIENT
Start: 2024-07-16 | End: 2024-07-16

## 2024-07-16 RX ORDER — LIDOCAINE HYDROCHLORIDE 10 MG/ML
INJECTION, SOLUTION EPIDURAL; INFILTRATION; INTRACAUDAL; PERINEURAL
Status: DISCONTINUED | OUTPATIENT
Start: 2024-07-16 | End: 2024-07-16

## 2024-07-16 NOTE — OPERATIVE REPORT
[facility]  Operative Report  2024     Tejal Franz Patient Status:  Hospital Outpatient Surgery    1955 MRN [id number]   Location [facility] PAIN CENTER Attending Blaze Guido MD   Hosp Day # 0 PCP Isaura Nunn MD     Indication: Tejal is a 68 year old female with lumbar radiculitis    Preoperative Diagnosis:  Lumbar radiculitis [M54.16]    Postoperative Diagnosis: Same as above.    Procedure performed: LUMBAR INTERLAMINAR EPIDURAL INJECTION with local    Anesthesia: Local      EBL: Less than 1 ml.    Procedure Description:  After reviewing the patient's history and performing a focused physical examination, the diagnosis and positive previous diagnostic tests were confirmed and contraindications such as infection and coagulopathy were ruled out.  Following review of allergies and potential side effects and complications, including but not necessarily limited to infection, allergic reaction, local tissue breakdown, nerve injury, post-dural puncture headache and paresis, the patient consented for the procedure.    The patient was brought to the procedure room in prone position.   After sterile prep of the lumbar spine, the L4-L5 interspace was identified with the help of fluoroscopy. Local anesthetic was given by a 25 gauge 1.5 inch needle with 1% lidocaine in that space level.  Thereafter, a 20 gauge Tuohy needle was introduced and advanced under fluoroscopy.  The epidural space was accessed by using loss of resistance to air technique.  The needle position was confirmed with AP and lateral view under fluoroscopy.  Omnipaque 180 was injected in 1 mL volume. A good epidurogram was obtained.  Thereafter, dexamethasone 10 mg with normal saline of 5 mL in total volume of 6 mL was injected through the Tuohy needle.  The needle was flushed with 1 mL lidocaine.  The needle was withdrawn with the stylet intact in situ.  The needle's tip was intact.  The patient tolerated  the procedure very well without significant immediate complication.  The patient's back was cleaned and sterile dressing was applied.  The patient was discharged with an instruction to a responsible adult after discharge criteria were met.  The patient was advised to contact us should any problems happen.  The patient was also informed to go to the emergency room immediately if experiencing severe numbness or weakness in the extremities or experiencing bowel or bladder incontinence.            Complications: None.    Follow up: The patient was followed in the pain clinic as needed basis.          Blaze Guido MD

## 2024-07-16 NOTE — H&P
History & Physical Examination    Patient Name: Tejal Franz  MRN: [id number]  [facility]: [id number]  YOB: 1955    Pre-Operative Diagnosis:  Lumbar radiculitis [M54.16]    Present Illness: Lumbar Radiculitis  ASA: 3  MP class: 1  Sedation: Local      Facility-Administered Medications Prior to Admission   Medication Dose Route Frequency Provider Last Rate Last Admin    [COMPLETED] ketorolac (Toradol) 30 MG/ML injection 30 mg  30 mg Intramuscular Once    30 mg at 05/16/24 1540    [COMPLETED] triamcinolone acetonide (Kenalog-40) 40 MG/ML injection 40 mg  40 mg Intra-articular Once    40 mg at 05/16/24 1547    [COMPLETED] ketorolac (Toradol) 30 MG/ML injection 30 mg  30 mg Intramuscular Once    30 mg at 05/16/24 1538    [COMPLETED] triamcinolone acetonide (Kenalog-40) 40 MG/ML injection 40 mg  40 mg Intra-articular Once    40 mg at 05/16/24 1545    [COMPLETED] ketorolac (Toradol) 30 MG/ML injection 30 mg  30 mg Intramuscular Once    30 mg at 05/06/24 1042    [COMPLETED] triamcinolone acetonide (Kenalog-40) 40 MG/ML injection 40 mg  40 mg Intra-articular Once    40 mg at 05/06/24 1042    [COMPLETED] ketorolac (Toradol) 30 MG/ML injection 30 mg  30 mg Intramuscular Once    30 mg at 05/06/24 1042    [COMPLETED] triamcinolone acetonide (Kenalog-40) 40 MG/ML injection 40 mg  40 mg Intra-articular Once    40 mg at 05/06/24 1042     Medications Prior to Admission   Medication Sig Dispense Refill Last Dose    gabapentin 300 MG Oral Cap Take 1 capsule (300 mg total) by mouth 3 (three) times daily. 270 capsule 1 7/16/2024 at 0800    metFORMIN  MG Oral Tablet 24 Hr Take 1 tablet (500 mg total) by mouth daily with breakfast. 30 tablet 0 7/16/2024    Acetaminophen Extra Strength 500 MG Oral Tab    7/16/2024 at 0800    diclofenac 75 MG Oral Tab EC Take 1 tablet (75 mg total) by mouth 2 (two) times daily. 60 tablet 1 7/14/2024 at 0800    cyclobenzaprine 10 MG Oral Tab Take 1 tablet (10 mg total) by mouth nightly. 30  tablet 0     [] Acetaminophen 500 MG Oral Cap Take 2 capsules (1,000 mg total) by mouth every 6 (six) hours for 14 days. 50 capsule 1     [] Acetaminophen 500 MG Oral Cap Take 2 capsules (1,000 mg total) by mouth every 6 (six) hours for 14 days. 50 capsule 1     escitalopram (LEXAPRO) 20 MG Oral Tab Take 1 tablet (20 mg total) by mouth daily. 90 tablet 1     Calcium Carbonate-Vitamin D 600-400 MG-UNIT Oral Tab Take 1 tablet by mouth daily.       Multiple Vitamins-Minerals (CENTRUM SILVER) Oral Tab Take 1 tablet by mouth daily.        No current facility-administered medications for this encounter.       Allergies: No Known Allergies    Past Medical History:    Arthritis    First Treatment by Dr. Prince    Depression    Obesity, unspecified     Past Surgical History:   Procedure Laterality Date    Cataract            Other surgical history      rectovag fistula    Other surgical history      gum surgery    Other surgical history      wisdom tooth     Family History   Problem Relation Age of Onset    Heart Disorder Father         pacemaker    Other (Other) Father         COPD    Heart Disorder Mother         MI; smoker    Depression Mother     Diabetes Mother     Obesity Mother     Psychiatric Mother     Cancer Paternal Grandmother     Depression Sister     Obesity Sister     Psychiatric Sister      Social History     Tobacco Use    Smoking status: Former     Current packs/day: 0.00     Average packs/day: 1.8 packs/day for 32.0 years (57.6 ttl pk-yrs)     Types: Cigarettes     Start date: 1972     Quit date: 2004     Years since quittin.5    Smokeless tobacco: Never    Tobacco comments:     quit about 8 years ago     Updated 24   Substance Use Topics    Alcohol use: Yes     Alcohol/week: 2.0 standard drinks of alcohol     Types: 2 Glasses of wine per week     Comment: I only have alcohol occasionally.       SYSTEM Check if Review is Normal Check if Physical Exam is Normal  If not normal, please explain:   HEENT [x ] [x ]    NECK & BACK [x ] [x ]    HEART [x ] [x ]    LUNGS [x ] [x ]    ABDOMEN [x ] [x ]    UROGENITAL [x ] [x ]    EXTREMITIES [x ] [x ]    OTHER        [ x ] I have discussed the risks and benefits and alternatives with the patient/family.  They understand and agree to proceed with plan of care.  [ x ] I have reviewed the History and Physical done within the last 30 days.  Any changes noted above.    Blaze Guido MD

## 2024-07-23 ENCOUNTER — PATIENT MESSAGE (OUTPATIENT)
Dept: FAMILY MEDICINE CLINIC | Facility: CLINIC | Age: 69
End: 2024-07-23

## 2024-07-23 DIAGNOSIS — R73.03 PREDIABETES: ICD-10-CM

## 2024-07-23 RX ORDER — METFORMIN HYDROCHLORIDE 500 MG/1
500 TABLET, EXTENDED RELEASE ORAL
Qty: 90 TABLET | Refills: 0 | OUTPATIENT
Start: 2024-07-23

## 2024-07-23 RX ORDER — METFORMIN HYDROCHLORIDE 500 MG/1
500 TABLET, EXTENDED RELEASE ORAL 2 TIMES DAILY
Qty: 180 TABLET | Refills: 0 | Status: SHIPPED | OUTPATIENT
Start: 2024-07-23

## 2024-07-23 NOTE — TELEPHONE ENCOUNTER
From: Tejal Franz  To: Consuelo Hogue  Sent: 7/23/2024 11:24 AM CDT  Subject: Prescription Renewal Denied    I received a message that the Metformin renewal has been denied. Is it possible that multiple requests were sent?

## 2024-07-31 ENCOUNTER — LAB ENCOUNTER (OUTPATIENT)
Dept: LAB | Age: 69
End: 2024-07-31
Attending: NURSE PRACTITIONER
Payer: MEDICARE

## 2024-07-31 ENCOUNTER — TELEPHONE (OUTPATIENT)
Dept: PAIN CLINIC | Facility: CLINIC | Age: 69
End: 2024-07-31

## 2024-07-31 ENCOUNTER — OFFICE VISIT (OUTPATIENT)
Dept: PAIN CLINIC | Facility: CLINIC | Age: 69
End: 2024-07-31
Payer: MEDICARE

## 2024-07-31 VITALS
DIASTOLIC BLOOD PRESSURE: 84 MMHG | SYSTOLIC BLOOD PRESSURE: 142 MMHG | WEIGHT: 268 LBS | OXYGEN SATURATION: 95 % | BODY MASS INDEX: 51 KG/M2 | HEART RATE: 68 BPM

## 2024-07-31 DIAGNOSIS — M54.16 LUMBAR RADICULITIS: Primary | ICD-10-CM

## 2024-07-31 DIAGNOSIS — R70.0 ELEVATED SED RATE: ICD-10-CM

## 2024-07-31 LAB — ERYTHROCYTE [SEDIMENTATION RATE] IN BLOOD: 39 MM/HR

## 2024-07-31 PROCEDURE — 99214 OFFICE O/P EST MOD 30 MIN: CPT | Performed by: ANESTHESIOLOGY

## 2024-07-31 PROCEDURE — 85652 RBC SED RATE AUTOMATED: CPT

## 2024-07-31 RX ORDER — CYCLOBENZAPRINE HCL 10 MG
10 TABLET ORAL NIGHTLY
Qty: 30 TABLET | Refills: 0 | Status: SHIPPED | OUTPATIENT
Start: 2024-07-31 | End: 2024-08-30

## 2024-07-31 NOTE — TELEPHONE ENCOUNTER
Order Questions    Question Answer   Anesthesia Type Local   Provider [facility]   Location [facility] Procedure Lab   Procedure Transforaminal   Laterality/Level left L5   CPT (Hit enter after each entry) INJECTION, ANESTHETIC/STEROID, TRANSFORAMINAL EPIDURAL; LUMBAR/SACRAL, SINGLE LEVEL   Medical clearance requested (will send to Pain Navigator) No   Patient has Medicare coverage? Yes   Comments (Please list entire procedure name here.) left lumbar 5 transforaminal epidural steroid injection

## 2024-07-31 NOTE — PATIENT INSTRUCTIONS
Refill policies:    Allow 2-3 business days for refills; controlled substances may take longer.  Contact your pharmacy at least 5 days prior to running out of medication and have them send an electronic request or submit request through the “request refill” option in your [facility] account.  Refills are not addressed on weekends; covering physicians do not authorize routine medications on weekends.  No narcotics or controlled substances are refilled after noon on Fridays or by on call physicians.  By law, narcotics must be electronically prescribed.  A 30 day supply with no refills is the maximum allowed.  If your prescription is due for a refill, you may be due for a follow up appointment.  To best provide you care, patients receiving routine medications need to be seen at least once a year.  Patients receiving narcotic/controlled substance medications need to be seen at least once every 3 months.  In the event that your preferred pharmacy does not have the requested medication in stock (e.g. Backordered), it is your responsibility to find another pharmacy that has the requested medication available.  We will gladly send a new prescription to that pharmacy at your request.    Scheduling Tests:    If your physician has ordered radiology tests such as MRI or CT scans, please contact Central Scheduling at [phone] right away to schedule the test.  Once scheduled, the [facility] Centralized Referral Team will work with your insurance carrier to obtain pre-certification or prior authorization.  Depending on your insurance carrier, approval may take 3-10 days.  It is highly recommended patients assure they have received an authorization before having a test performed.  If test is done without insurance authorization, patient may be responsible for the entire amount billed.      Precertification and Prior Authorizations:  If your physician has recommended that you have a procedure or additional testing performed the [facility]  Centralized Referral Team will contact your insurance carrier to obtain pre-certification or prior authorization.    You are strongly encouraged to contact your insurance carrier to verify that your procedure/test has been approved and is a COVERED benefit.  Although the [facility] Centralized Referral Team does its due diligence, the insurance carrier gives the disclaimer that \"Although the procedure is authorized, this does not guarantee payment.\"    Ultimately the patient is responsible for payment.   Thank you for your understanding in this matter.  Paperwork Completion:  If you require FMLA or disability paperwork for your recovery, please make sure to either drop it off or have it faxed to our office at [phone]. Be sure the form has your name and date of birth on it.  The form will be faxed to our Forms Department and they will complete it for you.  There is a 25$ fee for all forms that need to be filled out.  Please be aware there is a 10-14 day turnaround time.  You will need to sign a release of information (NIKOLAS) form if your paperwork does not come with one.  You may call the Forms Department with any questions at [phone].  Their fax number is [phone].

## 2024-07-31 NOTE — PROGRESS NOTES
Name: Tejal Franz   : 1955   DOS: 2024     Pain Clinic Follow Up Visit:     Chief Complaint   Patient presents with    Procedure Follow Up     LUMBAR INTERLAMINAR EPIDURAL INJECTION with local       Tejal Franz is a 68 year old female with lumbar degenerative disease most significant L5-S1.  There is severe left and moderate right foraminal stenosis.  The patient is following up after epidural injection.  She reports 80% improvement in right-sided symptoms and 30% improvement in left-sided symptoms.    Pt denies any chills, fever, or weakness. There is no bladder or bowel incontinence associated with the pain.    REVIEW OF SYSTEMS:  A ten point review of systems was performed with pertinent positives and negatives in the HPI.    No Known Allergies    Current Outpatient Medications   Medication Sig Dispense Refill    metFORMIN  MG Oral Tablet 24 Hr Take 1 tablet (500 mg total) by mouth in the morning and 1 tablet (500 mg total) before bedtime. 180 tablet 0    gabapentin 300 MG Oral Cap Take 1 capsule (300 mg total) by mouth 3 (three) times daily. 270 capsule 1    diclofenac 75 MG Oral Tab EC Take 1 tablet (75 mg total) by mouth 2 (two) times daily. 60 tablet 1    Acetaminophen Extra Strength 500 MG Oral Tab       escitalopram (LEXAPRO) 20 MG Oral Tab Take 1 tablet (20 mg total) by mouth daily. 90 tablet 1    Calcium Carbonate-Vitamin D 600-400 MG-UNIT Oral Tab Take 1 tablet by mouth daily.      Multiple Vitamins-Minerals (CENTRUM SILVER) Oral Tab Take 1 tablet by mouth daily.      cyclobenzaprine 10 MG Oral Tab Take 1 tablet (10 mg total) by mouth nightly. 30 tablet 0         EXAM:   /84   Pulse 68   Wt 268 lb (121.6 kg)   SpO2 95%   BMI 50.64 kg/m²   General:  Patient is a(n) 68 year old year old female in no acute distress.  Neurologic:: WNL-Orientation to time, place and person, normal mood & affect, concentration & attention span intact.   Inspection:  Ambulates with  well-coordinated, fluid, non-antalgic gait.  Gait is normal.  Neck: Full ROM  Neuro: Grossly intact  Respiratory: Nonlabored    Back: Gait is intact    IMAGES:     MRI reviewed with severe left foraminal stenosis L5-S1    ASSESSMENT AND PLAN:     1. Lumbar radiculitis        The patient is a 68 femal with a longstanding history of lumbar radiculitis.   she is doing well after lumbar epidural steroid injection with near resolution of the right-sided symptoms.  Does still have left-sided symptoms I recommended lumbar transforaminal approach.  Patient also had questions regarding other upcoming injections are answered.    Orders:  Orders Placed This Encounter   Procedures    Critical access hospital PAIN NAVIGATOR         Radiology orders and consultations:None  The patient indicates understanding of these issues and agrees to the plan.  No follow-ups on file.    Blaze Guido MD, 7/31/2024, 10:47 AM

## 2024-07-31 NOTE — PROGRESS NOTES
Last procedure: LUMBAR INTERLAMINAR EPIDURAL INJECTION with local   Date: 07/16/24  Percentage of relief obtained: 80% on right, 30% on left  Duration of relief: current    Current Pain Score: 3

## 2024-07-31 NOTE — TELEPHONE ENCOUNTER
Patient advised of insurance approval to proceed with injections and is agreeable to scheduling. Patient scheduled for procedure, pre-procedure instructions reviewed. Patient prefers Local sedation. Reviewed sedation instructions including No Fasting & No  Required. Patient encouraged but not required to hold Diclofenac for 24 hours prior to procedure. Patient verbalized understanding of instructions, no further needs at this time.    Pre Procedure Instruction Sheet provided to patient at office visit.       [facility]  PRE-PROCEDURE INSTRUCTIONS WITHOUT SEDATION    Procedure: Left L5 TLESI       Appointment Date: 08/05/2024  Check-In Time: 10:30 AM    Follow-Up Date/Time w/ : 08/19/2024 @ 03:00 PM    Prior to the procedure:  Please update us prior to the procedure if you are experiencing any symptoms of infection such as cough, fever, chills, urinary symptoms, or have recently been prescribed antibiotics, have open wounds, have recently had surgery or dental procedures.    Day of Procedure:  **Drivers will be required for patients who receive prescriptions for Valium.    NO FASTING REQUIRED  Please bring your Insurance Card, Photo ID, List of Current Medications and Referral (if applicable) to your appointment.  Please [facility] in the [place] and [facility]  Check in at [facility] ([address] [place] [address]) outpatient registration in the [facility].  Please note-No prescriptions will be written by Pain Clinic in OR on the day of procedure. If you require a refill of medications, please contact the office 48 hours prior to your procedure.  If you have an implanted Spinal Cord or Peripheral Nerve Stimulator: Please remember to turn device off for procedure.        Medication Hold:    Number of days you need to be off for the following medications:    Aggrenox 10 days   Agrylin (Anagrelide) 10 days  Brilinta (Ticagrelor) 7  days  Imbruvica (Ibrutinib) 3 days   Enbrel (Etanercept) 24 hours   Fragmin (Dalteparin) 24 hours   Pletal (Cilostazol) 7 days  Effient (Prasugrel) 7 days  Pradaxa 10 days  Trental 7 days  Eliquis (Apixaban) 3 days  Xarelto (Rivaroxaban) 3 days  Lovenox (Enoxaparin) 24 hours  Aspirin  Greater than 81mg but less than 325mg   5 days  325mg and greater                  7 days  Coumadin       5 days  Procedure may be cancelled if INR is elevated.   Excedrin (with aspirin) 7 days  Plavix (Clopidogrel)                            7 days    NSAIDs: 24 hours preferred      Ibuprofen (Motrin, Advil, Vicoprofen), Naproxen (Naprosyn, Aleve), Piroxcam (Feldene), Meloxicam (Mobic), Oxaprozin (Daypro), Diclofenac (Voltaren), Indomethacin (Indocin), Etodolac (Lodine), Nabumetone (Relafen), Celebrex (Celecoxib)           HERBAL SUPPLEMENTS  5 days preferred  Fish oil, krill oil, Omega-3, Vascepa, Vitamin E, Turmeric, Garlic                       Insurance Authorization:   Most insurances are now requiring a preauthorization for all procedures.  In the event that your insurance does not authorize your procedure within 48 hours of the scheduled date, your procedure will be cancelled and rescheduled to a later date.  Please contact your insurance carrier to determine what your financial responsibility will be for the procedure(s).      Cancellation/Rescheduling Appointment:   In the event you need to cancel or reschedule your appointment, you must notify the office 24 hours prior.    Post-procedure instructions:        Please schedule a follow up visit within 2 to 4 weeks after your last procedure date   Please call our office with any questions or concerns before or after your procedure at  [phone].  If you are a diabetic, please increase the frequency of your glucose monitoring after the procedure as this may cause a temporary increase in your blood sugar.  Contact your primary care physician if your blood sugar rises as you may  require some medication adjustment.  It is normal to have increased pain at injection site for up to 3-5 days after procedure, you can use heat or ice (20 minutes on 20 minutes off) for comfort.    **To hear a recorded version of these instructions, please call [phone] and follow the prompts.  **Para escuchar las instrucciones en Español, por favor de llamar el jimy [phone] opción 4.

## 2024-08-05 ENCOUNTER — APPOINTMENT (OUTPATIENT)
Dept: GENERAL RADIOLOGY | Facility: HOSPITAL | Age: 69
End: 2024-08-05
Attending: ANESTHESIOLOGY
Payer: MEDICARE

## 2024-08-05 ENCOUNTER — HOSPITAL ENCOUNTER (OUTPATIENT)
Facility: HOSPITAL | Age: 69
Setting detail: HOSPITAL OUTPATIENT SURGERY
Discharge: HOME | End: 2024-08-05
Attending: ANESTHESIOLOGY | Admitting: ANESTHESIOLOGY
Payer: MEDICARE

## 2024-08-05 VITALS
HEART RATE: 72 BPM | OXYGEN SATURATION: 91 % | TEMPERATURE: 98 F | RESPIRATION RATE: 20 BRPM | SYSTOLIC BLOOD PRESSURE: 125 MMHG | DIASTOLIC BLOOD PRESSURE: 86 MMHG

## 2024-08-05 PROCEDURE — 3E0R33Z INTRODUCTION OF ANTI-INFLAMMATORY INTO SPINAL CANAL, PERCUTANEOUS APPROACH: ICD-10-PCS | Performed by: ANESTHESIOLOGY

## 2024-08-05 PROCEDURE — 64483 NJX AA&/STRD TFRM EPI L/S 1: CPT | Performed by: ANESTHESIOLOGY

## 2024-08-05 RX ORDER — LIDOCAINE HYDROCHLORIDE 10 MG/ML
INJECTION, SOLUTION EPIDURAL; INFILTRATION; INTRACAUDAL; PERINEURAL
Status: DISCONTINUED | OUTPATIENT
Start: 2024-08-05 | End: 2024-08-05

## 2024-08-05 RX ORDER — DEXAMETHASONE SODIUM PHOSPHATE 10 MG/ML
INJECTION, SOLUTION INTRAMUSCULAR; INTRAVENOUS
Status: DISCONTINUED | OUTPATIENT
Start: 2024-08-05 | End: 2024-08-05

## 2024-08-05 RX ORDER — SODIUM CHLORIDE 9 MG/ML
INJECTION, SOLUTION INTRAMUSCULAR; INTRAVENOUS; SUBCUTANEOUS
Status: DISCONTINUED | OUTPATIENT
Start: 2024-08-05 | End: 2024-08-05

## 2024-08-05 NOTE — H&P
History & Physical Examination    Patient Name: Tejal Franz  MRN: [id number]  [facility]: [id number]  YOB: 1955    Pre-Operative Diagnosis:  Lumbar radiculitis [M54.16]    Present Illness: Lumbar radiculitis    ASA: 2  MP class: 1  Sedation: Local     Facility-Administered Medications Prior to Admission   Medication Dose Route Frequency Provider Last Rate Last Admin    [COMPLETED] ketorolac (Toradol) 30 MG/ML injection 30 mg  30 mg Intramuscular Once    30 mg at 24 1540    [COMPLETED] triamcinolone acetonide (Kenalog-40) 40 MG/ML injection 40 mg  40 mg Intra-articular Once    40 mg at 24 1547    [COMPLETED] ketorolac (Toradol) 30 MG/ML injection 30 mg  30 mg Intramuscular Once    30 mg at 24 1538    [COMPLETED] triamcinolone acetonide (Kenalog-40) 40 MG/ML injection 40 mg  40 mg Intra-articular Once    40 mg at 24 1545     Medications Prior to Admission   Medication Sig Dispense Refill Last Dose    cyclobenzaprine 10 MG Oral Tab Take 1 tablet (10 mg total) by mouth nightly. 30 tablet 0 2024 at 2100    metFORMIN  MG Oral Tablet 24 Hr Take 1 tablet (500 mg total) by mouth in the morning and 1 tablet (500 mg total) before bedtime. 180 tablet 0 2024 at 0530    gabapentin 300 MG Oral Cap Take 1 capsule (300 mg total) by mouth 3 (three) times daily. 270 capsule 1 2024 at 0530    diclofenac 75 MG Oral Tab EC Take 1 tablet (75 mg total) by mouth 2 (two) times daily. 60 tablet 1 2024 at 0500    Acetaminophen Extra Strength 500 MG Oral Tab    2024 at 0530    escitalopram (LEXAPRO) 20 MG Oral Tab Take 1 tablet (20 mg total) by mouth daily. 90 tablet 1 2024 at 1100    [] Acetaminophen 500 MG Oral Cap Take 2 capsules (1,000 mg total) by mouth every 6 (six) hours for 14 days. 50 capsule 1     [] Acetaminophen 500 MG Oral Cap Take 2 capsules (1,000 mg total) by mouth every 6 (six) hours for 14 days. 50 capsule 1     Calcium Carbonate-Vitamin D 600-400  MG-UNIT Oral Tab Take 1 tablet by mouth daily.       Multiple Vitamins-Minerals (CENTRUM SILVER) Oral Tab Take 1 tablet by mouth daily.        No current facility-administered medications for this encounter.       Allergies: No Known Allergies    Past Medical History:    Arthritis    First Treatment by Dr. Prince    Depression    Obesity, unspecified     Past Surgical History:   Procedure Laterality Date    Cataract            Other surgical history      rectovag fistula    Other surgical history      gum surgery    Other surgical history      wisdom tooth     Family History   Problem Relation Age of Onset    Heart Disorder Father         pacemaker    Other (Other) Father         COPD    Heart Disorder Mother         MI; smoker    Depression Mother     Diabetes Mother     Obesity Mother     Psychiatric Mother     Cancer Paternal Grandmother     Depression Sister     Obesity Sister     Psychiatric Sister      Social History     Tobacco Use    Smoking status: Former     Current packs/day: 0.00     Average packs/day: 1.8 packs/day for 32.0 years (57.6 ttl pk-yrs)     Types: Cigarettes     Start date: 1972     Quit date: 2004     Years since quittin.6    Smokeless tobacco: Never    Tobacco comments:     quit about 8 years ago     Updated 24   Substance Use Topics    Alcohol use: Yes     Alcohol/week: 2.0 standard drinks of alcohol     Types: 2 Glasses of wine per week     Comment: I only have alcohol occasionally.       SYSTEM Check if Review is Normal Check if Physical Exam is Normal If not normal, please explain:   HEENT [x ] [x ]    NECK & BACK [x ] [x ]    HEART [x ] [x ]    LUNGS [x ] [x ]    ABDOMEN [x ] [x ]    UROGENITAL [x ] [x ]    EXTREMITIES [x ] [x ]    OTHER        [ x ] I have discussed the risks and benefits and alternatives with the patient/family.  They understand and agree to proceed with plan of care.  [ x ] I have reviewed the History and Physical done within the last  30 days.  Any changes noted above.    Blaze Guido MD

## 2024-08-05 NOTE — DISCHARGE INSTRUCTIONS
Home Care Instructions Following Your Pain Procedure     Tejal,  It has been a pleasure to have you as our patient. To help you at home, you must follow these general discharge instructions. We will review these with you before you are discharged. It is our hope that you have a complete and uneventful recovery from our procedure.     General Instructions:  What to Expect:  Bandages from your procedure today can be removed when you get home.  Please avoid soaking and/or swimming for 24 hours.  Showering is okay  It is normal to have increased pain symptoms and/or pain at injection site for up to 3-5 days after procedure, you can use heat or ice (20 minutes on 20 minutes off) for comfort.  You may experience some temporary side effects which may include restlessness or insomnia, flushing of the face, or heart palpitations.  Please contact the provider if these symptoms do not resolve within 3-4 days.  Lightheadedness or nausea may occur and should resolve within 24 to 48 hours.  If you develop a headache after treatment, rest, drink fluids (with caffeine, if possible) and take mild over-the-counter pain medication.  If the headache does not improve with the above treatment, contact the physician.  Home Medications:  Resume all previously prescribed medication.  Please avoid taking NSAIDs (Non-Steriodal Anti-Inflammatory Drugs) such as:  Ibuprofen ( Advil, Motrin) Aleve (Naproxen), Diclofenac, Meloxicam for 6 hours after procedure.   If you are on Coumadin (Warfarin) or any other anti-coagulant (or \"blood thinning\") medication such as Plavix (Clopidogrel), Xarelto (Rivaroxaban), Eliquis (Apixaban), Effient (Prasugrel) etc., restart on the following day from the procedure unless otherwise directed by your provider.  If you are a diabetic, please increase the frequency of your glucose monitoring after the procedure as steroids may cause a temporary (2-3 day) increase in your blood sugar.  Contact your primary care  physician if your blood sugar remains elevated as you may require some medication adjustment.  Diet:  Resume your regular diet as tolerated.  Activity:  We recommend that you relax and rest during the rest of your procedure day.  If you feel weakness in your arms or legs do not drive.  Follow-up Appointment  Please schedule a follow-up visit within 3 to 4 weeks after your last procedure date.  Question or Concerns:  Feel free to call our office with any questions or concerns at [phone] (option #2)    Tejal  Thank you for coming to [facility] for your procedure.  The nurses try very hard to make sure you receive the best care possible.  Your trust in them as well as us is greatly appreciated.    Thanks so much,   Dr. Blaze Guido

## 2024-08-05 NOTE — OPERATIVE REPORT
[facility]  Operative Report  2024     Tejal Franz Patient Status:  Hospital Outpatient Surgery    1955 MRN [id number]   Location [facility]IN [facility] Attending Blaze Guido MD   Hosp Day # 0 PCP Isaura Nunn MD     Indication: Tejal is a 68 year old female with lumbar radiculitis. MRI reviewed consistent with radiculopathy.    Preoperative Diagnosis:  Lumbar radiculitis [M54.16]    Postoperative Diagnosis: Same as above.    Procedure performed: left lumbar 5 TRANSFORAMINAL  EPIDURAL STEROID INJECTION SINGLE LEVEL with local    Anesthesia: Local      EBL: Less than 1 ml.    Procedure Description:   After reviewing the patient's history and performing a focused physical examination, the diagnosis was confirmed and contraindications such as infection and coagulopathy were ruled out.  Following review of potential side effects and complications, including but not necessarily limited to infection, allergic reaction, local tissue breakdown, nerve injury, and paresis, the patient indicated they understood and agreed to proceed. After obtaining the informed consent, the patient was brought to the procedure room and monitored.     In the prone position, following sterile prep and drape of the lumbar region, the L5 neural foramen was identified under fluoroscopy.  The skin and subcutaneous tissue was anesthetized via 25-gauge 1.5\" needle with approximately 2 cc of 1% lidocaine.  A 22-gauge 5\" Quincke spinal needle was introduced toward the inferior aspect of the junction between the transverse process and pedicle of the L5 level atraumatically under fluoroscopic guidance. The needle was advanced into the anterior epidural space at this level. The needle position was confirmed under AP and lateral fluoroscopic view.  Following negative aspiration for CSF and blood, approximately 1 cc of Omnipaque 240 was injected.  An excellent contrast spread along the epidural space and  the nerve root was obtained.  At this point, 2 cc of normal saline with 10 mg of dexamethasone was injected without complication.  The needle was withdrawn with stylet in situ. The patient tolerated procedure very well.  The patient was observed until discharge criteria met.  Discharge instructions were given and patient was released to a responsible adult.       Complications: None.    Follow up: The patient was followed in the pain clinic as needed basis.      Blaze Guido MD

## 2024-08-06 ENCOUNTER — TELEPHONE (OUTPATIENT)
Dept: PAIN CLINIC | Facility: CLINIC | Age: 69
End: 2024-08-06

## 2024-08-06 NOTE — TELEPHONE ENCOUNTER
Follow-up call post pain procedure. Left message informing patient to contact the pain clinic at [phone] option #3 regarding any questions or concerns about recent pain procedure.       Procedure: left lumbar 5 TRANSFORAMINAL  EPIDURAL STEROID INJECTION SINGLE LEVEL with local   Date: 08/05/24  Follow up Visit Scheduled: 08/19/24

## 2024-08-19 ENCOUNTER — OFFICE VISIT (OUTPATIENT)
Dept: PAIN CLINIC | Facility: CLINIC | Age: 69
End: 2024-08-19
Payer: MEDICARE

## 2024-08-19 VITALS — SYSTOLIC BLOOD PRESSURE: 142 MMHG | DIASTOLIC BLOOD PRESSURE: 82 MMHG | OXYGEN SATURATION: 92 % | HEART RATE: 70 BPM

## 2024-08-19 DIAGNOSIS — M54.16 LUMBAR RADICULITIS: Primary | ICD-10-CM

## 2024-08-19 PROCEDURE — 99214 OFFICE O/P EST MOD 30 MIN: CPT | Performed by: ANESTHESIOLOGY

## 2024-08-19 NOTE — PROGRESS NOTES
Last procedure: left lumbar 5 TRANSFORAMINAL  EPIDURAL STEROID INJECTION SINGLE LEVEL with local   Date: 8/5/24  Percentage of relief obtained: 80%  Duration of relief: ~5 days, pt is experiencing some sustained relief but it is waning    Current Pain Score: 3/10    Narcotic Contract Exp: n/a

## 2024-08-19 NOTE — PROGRESS NOTES
Name: Tejal Franz   : 1955   DOS: 2024     Pain Clinic Follow Up Visit:     Chief Complaint   Patient presents with    Follow - Up     left lumbar 5 TRANSFORAMINAL  EPIDURAL STEROID INJECTION SINGLE LEVEL with local       Tejal Franz is a 68 year old female with a history of lumbar degenerative disc disease and foraminal stenosis.  Patient is following up after lumbar transforaminal epidural steroid injection.  Reports 80% relief of left-sided radicular symptoms.  Overall, pleased with results of the procedure..  Discussed additional treatment options.    Pt denies any chills, fever, or weakness. There is no bladder or bowel incontinence associated with the pain.    REVIEW OF SYSTEMS:  A ten point review of systems was performed with pertinent positives and negatives in the HPI.    No Known Allergies    Current Outpatient Medications   Medication Sig Dispense Refill    cyclobenzaprine 10 MG Oral Tab Take 1 tablet (10 mg total) by mouth nightly. 30 tablet 0    metFORMIN  MG Oral Tablet 24 Hr Take 1 tablet (500 mg total) by mouth in the morning and 1 tablet (500 mg total) before bedtime. 180 tablet 0    gabapentin 300 MG Oral Cap Take 1 capsule (300 mg total) by mouth 3 (three) times daily. 270 capsule 1    diclofenac 75 MG Oral Tab EC Take 1 tablet (75 mg total) by mouth 2 (two) times daily. 60 tablet 1    Acetaminophen Extra Strength 500 MG Oral Tab       escitalopram (LEXAPRO) 20 MG Oral Tab Take 1 tablet (20 mg total) by mouth daily. 90 tablet 1    Calcium Carbonate-Vitamin D 600-400 MG-UNIT Oral Tab Take 1 tablet by mouth daily.      Multiple Vitamins-Minerals (CENTRUM SILVER) Oral Tab Take 1 tablet by mouth daily.           EXAM:   /82 (BP Location: Left arm, Patient Position: Sitting, Cuff Size: large)   Pulse 70   SpO2 92%   General:  Patient is a(n) 68 year old year old female in no acute distress.  Neurologic:: WNL-Orientation to time, place and person, normal mood &  affect, concentration & attention span intact.   Inspection:  Ambulates with well-coordinated, fluid, non-antalgic gait.  Gait is intact with walker  Neck: Full range of motion  Cranial nerve: Grossly intact  Respiratory: Speech is nonlabored  Back: Gait intact    IMAGES:     MRI reviewed with degenerative changes most significant L5-S1.  There is severe left foraminal stenosis    ASSESSMENT AND PLAN:     1. Lumbar radiculitis        The patient is a 68-year-old with a history of lumbar degenerative disc disease and foraminal stenosis.  The patient is following up after transforaminal epidural steroid injection with significant improvement in radicular symptoms.  Overall, very pleased with results of the procedure.  Denies any complications.  Discussed role for surgical decompression versus repeat injection in the future.  Patient would like to follow-up on as-needed basis.      Radiology orders and consultations:None  The patient indicates understanding of these issues and agrees to the plan.  No follow-ups on file.    Blaze Guido MD, 8/19/2024, 3:11 PM

## 2024-08-19 NOTE — PATIENT INSTRUCTIONS
Refill policies:    Allow 2-3 business days for refills; controlled substances may take longer.  Contact your pharmacy at least 5 days prior to running out of medication and have them send an electronic request or submit request through the “request refill” option in your [employer] account.  Refills are not addressed on weekends; covering physicians do not authorize routine medications on weekends.  No narcotics or controlled substances are refilled after noon on Fridays or by on call physicians.  By law, narcotics must be electronically prescribed.  A 30 day supply with no refills is the maximum allowed.  If your prescription is due for a refill, you may be due for a follow up appointment.  To best provide you care, patients receiving routine medications need to be seen at least once a year.  Patients receiving narcotic/controlled substance medications need to be seen at least once every 3 months.  In the event that your preferred pharmacy does not have the requested medication in stock (e.g. Backordered), it is your responsibility to find another pharmacy that has the requested medication available.  We will gladly send a new prescription to that pharmacy at your request.    Scheduling Tests:    If your physician has ordered radiology tests such as MRI or CT scans, please contact Central Scheduling at [phone] right away to schedule the test.  Once scheduled, the [facility] Centralized Referral Team will work with your insurance carrier to obtain pre-certification or prior authorization.  Depending on your insurance carrier, approval may take 3-10 days.  It is highly recommended patients assure they have received an authorization before having a test performed.  If test is done without insurance authorization, patient may be responsible for the entire amount billed.      Precertification and Prior Authorizations:  If your physician has recommended that you have a procedure or additional testing performed the [facility]  Centralized Referral Team will contact your insurance carrier to obtain pre-certification or prior authorization.    You are strongly encouraged to contact your insurance carrier to verify that your procedure/test has been approved and is a COVERED benefit.  Although the [facility] Centralized Referral Team does its due diligence, the insurance carrier gives the disclaimer that \"Although the procedure is authorized, this does not guarantee payment.\"    Ultimately the patient is responsible for payment.   Thank you for your understanding in this matter.  Paperwork Completion:  If you require FMLA or disability paperwork for your recovery, please make sure to either drop it off or have it faxed to our office at [phone]. Be sure the form has your name and date of birth on it.  The form will be faxed to our Forms Department and they will complete it for you.  There is a 25$ fee for all forms that need to be filled out.  Please be aware there is a 10-14 day turnaround time.  You will need to sign a release of information (NIKOLAS) form if your paperwork does not come with one.  You may call the Forms Department with any questions at [phone].  Their fax number is [phone].

## 2024-08-20 ENCOUNTER — OFFICE VISIT (OUTPATIENT)
Dept: ORTHOPEDICS CLINIC | Facility: CLINIC | Age: 69
End: 2024-08-20
Payer: MEDICARE

## 2024-08-20 VITALS — WEIGHT: 268 LBS | BODY MASS INDEX: 50.6 KG/M2 | HEIGHT: 61 IN

## 2024-08-20 DIAGNOSIS — M54.16 LUMBAR RADICULOPATHY: ICD-10-CM

## 2024-08-20 DIAGNOSIS — M16.0 PRIMARY OSTEOARTHRITIS OF BOTH HIPS: Primary | ICD-10-CM

## 2024-08-20 DIAGNOSIS — M43.06 LUMBAR SPONDYLOLYSIS: ICD-10-CM

## 2024-08-20 RX ORDER — KETOROLAC TROMETHAMINE 30 MG/ML
30 INJECTION, SOLUTION INTRAMUSCULAR; INTRAVENOUS ONCE
Status: COMPLETED | OUTPATIENT
Start: 2024-08-20 | End: 2024-08-20

## 2024-08-20 RX ORDER — TRIAMCINOLONE ACETONIDE 40 MG/ML
40 INJECTION, SUSPENSION INTRA-ARTICULAR; INTRAMUSCULAR ONCE
Status: COMPLETED | OUTPATIENT
Start: 2024-08-20 | End: 2024-08-20

## 2024-08-20 RX ORDER — GABAPENTIN 600 MG/1
600 TABLET ORAL 3 TIMES DAILY
Qty: 270 TABLET | Refills: 0 | Status: SHIPPED | OUTPATIENT
Start: 2024-08-20 | End: 2024-11-18

## 2024-08-20 RX ADMIN — TRIAMCINOLONE ACETONIDE 40 MG: 40 INJECTION, SUSPENSION INTRA-ARTICULAR; INTRAMUSCULAR at 12:14:00

## 2024-08-20 RX ADMIN — KETOROLAC TROMETHAMINE 30 MG: 30 INJECTION, SOLUTION INTRAMUSCULAR; INTRAVENOUS at 12:14:00

## 2024-08-20 NOTE — PROGRESS NOTES
Sports Medicine Clinic Note    Subjective:    Chief Complaint: Bilateral hip pain with partial improvement following prior injections.    Interval History: The patient is a 68-year-old female returning for a follow-up visit. She reports continued bilateral hip pain but with some improvement following the last hip injections on 05/16/2024. She is pleased with the results of the recent lumbar transforaminal epidural steroid injection, noting an 80% reduction in her left-sided radicular symptoms. However, she continues to experience persistent groin pain bilaterally, which only partially responded to the previous hip injections. She expresses interest in repeating the hip injections today in hopes of further alleviating her symptoms. She denies any new trauma or injury. No bowel or bladder changes. No new neurological symptoms. She continues to work on weight loss as recommended.    Objective:    Body mass index is 50.64 kg/m².    Bilateral Hip Examination:    Inspection:   - No erythema, warmth, or visible deformity.  - Mild discomfort noted with weight bearing.    Palpation:   - Mild tenderness over bilateral groin and anterior hip regions.  - No tenderness over the trochanteric regions.    Neurovascular:   - Intact sensation to light touch in the femoral, obturator, and sciatic nerve distributions bilaterally.  - 2+ femoral, popliteal, dorsalis pedis, and posterior tibial pulses bilaterally.    Diagnostic Tests:    No ne testing.    X-rays of Bilateral Hips (Performed Previously):  - No evidence of acute fracture or dislocation.  - Mild to moderate joint space narrowing bilaterally consistent with osteoarthritis.  - Subchondral sclerosis and marginal osteophytes noted.  - No significant osteonecrosis or bone collapse.    Assessment:    Bilateral hip osteoarthritis with persistent groin pain, partially responsive to previous injections.  Lumbar radiculopathy, significantly improved following lumbar transforaminal  epidural steroid injection.  Obesity, contributing to load on weight-bearing joints.    Plan:    Additional Imaging/Workup:  - No further imaging needed at this time.    Therapy:  - Continue working on weight loss through a balanced diet and low-impact exercise, such as swimming or cycling.    Medications:  - Continue current regimen of NSAIDs and acetaminophen as needed for pain control. Titrate gabapentin up to 600 mg TID.    Bracing/Casting:  - Not indicated at this time.    Procedures:  - Proceed with bilateral intra-articular hip injections today for pain relief. The injections will include a corticosteroid and anesthetic.    Activity Recommendations:  - Continue to avoid high-impact activities.  - Encourage gentle range of motion exercises for the hips.  - Gradually reintroduce physical therapy as tolerated, focusing on joint mobility and strength.    Follow-Up: Tentatively scheduled in 3-4 weeks to assess the response to today's injections and overall symptom improvement. Earlier follow-up if symptoms worsen or if there are any new developments.    Ultrasound Guided Procedure Note:     After discussion of the risks and benefits, the patient elected to proceed with a therapeutic injection into the bilateral hips under US guidance. Confirmed that the patient does not have history of prior adverse reactions, active infections, or relevant allergies. There was no erythema or warmth, and the skin was clear.     For each hip:     The skin was sterilized with ChloraPrep. A 22 gauge needle was inserted via inferolateral approach utilizing US for needle guidance and placement. The site was injected with a mixture of 1 mL of kenalog 40 mg/mL, 1 mL of Toradol 30 mg/mL and 1 mL of 1% Lidocaine without Epinephrine. The injection was completed without complication, and a bandage was applied.     The patient tolerated the procedure well and was instructed to avoid strenuous activity for the next 24-48 hours and to use ice  or Tylenol for pain as needed. The patient will call immediately with any signs of infection or allergic reaction.     Post-Injection Care: The patient tolerated the procedure well. An occlusive bandage was placed over the injection site. Post-injection care instructions provided to the patient. The patient was asked to avoid strenuous activity and continue to rest the area for 2-3 days before resuming regular activities. Patient advised that the area may be more painful for the first 1-2 days. They can use ice or Tylenol for pain as needed.  Patient was instructed to watch for fever, increased swelling, or persistent pain. The patient will call immediately with any signs of infection or allergic reaction.     Complications: The patient tolerated the procedure well without any complications.      Bjorn Ramirez DO, CAM   Primary Care Sports Medicine    Department of Orthopaedic Surgery  [facility]    [address] [place] [address]  [address] [place] [address]    t: [phone]  f: [phone]      [email or web address]

## 2024-08-22 ENCOUNTER — OFFICE VISIT (OUTPATIENT)
Dept: ORTHOPEDICS CLINIC | Facility: CLINIC | Age: 69
End: 2024-08-22
Payer: MEDICARE

## 2024-08-22 VITALS — BODY MASS INDEX: 50.6 KG/M2 | WEIGHT: 268 LBS | HEIGHT: 61 IN

## 2024-08-22 DIAGNOSIS — M17.11 PRIMARY OSTEOARTHRITIS OF RIGHT KNEE: Primary | ICD-10-CM

## 2024-08-22 DIAGNOSIS — M17.12 PRIMARY OSTEOARTHRITIS OF LEFT KNEE: ICD-10-CM

## 2024-08-22 RX ORDER — DICLOFENAC SODIUM 75 MG/1
75 TABLET, DELAYED RELEASE ORAL 2 TIMES DAILY
Qty: 28 TABLET | Refills: 1 | Status: SHIPPED | OUTPATIENT
Start: 2024-08-22

## 2024-08-22 NOTE — PROGRESS NOTES
Sports Medicine Clinic Note    Subjective:    Chief Complaint: Follow-up for bilateral knee pain.    Interval History: The patient is a 68-year-old female who returns for follow-up regarding bilateral knee pain and to proceed with the viscosupplementation injections for which prior authorization has been obtained. She continues to experience significant discomfort in both knees, particularly after prolonged activity. There has been no new trauma or injury.    Objective:    Bilateral Knee Examination:    Inspection:  No visible effusion or erythema noted.    Palpation:  Tenderness remains over the medial joint lines bilaterally.  No tenderness over the lateral joint lines, patella, or tibial tubercle.    Range of Motion:  Flexion: 0-130 degrees with mild pain at end ranges.  Extension: 0 degrees.    Neurovascular:  2+ dorsalis pedis and posterior tibial pulses bilaterally.  Sensation intact to light touch in the femoral, tibial, saphenous, sural, and peroneal nerve distributions.    Diagnostic Tests:    X-rays previously reviewed showing advanced osteoarthritis with joint space narrowing and marginal osteophytes, most prominent in the medial tibiofemoral compartments bilaterally.    Assessment:    Bilateral knee osteoarthritis with ongoing pain despite conservative management.    Plan:    Therapy:  Continue physical therapy with a focus on hip and knee mobility, core strengthening, and exercises to alleviate lumbar radiculopathy.    Medications:  Continue current regimen: Tylenol and NSAIDs as needed. Lidocaine patches for lumbar region.    Procedures:  Proceed with viscosupplementation injections today for both knees as previously authorized. Plan for three injections per knee, one week apart.    Activity Recommendations:  Encourage weight loss with ongoing support through the weight loss clinic.  Advise the patient to avoid activities that exacerbate knee pain, such as prolonged standing or walking. Continue with  gentle activities like swimming or cycling.    Follow-Up:  The patient is scheduled for completion of the viscosupplementation series and to assess the response to treatment. Earlier follow-up if symptoms worsen or new symptoms arise.    Procedure Note:    After discussion of the risks and benefits, the patient elected to proceed with a therapeutic injection into the bilateral knee (tibiofemoral space). Confirmed that the patient does not have history of prior adverse reactions, active infections, or relevant allergies. There was no erythema or warmth, and the skin was clear.    For each side:    The skin was sterilized with ChloraPrep. A 22 gauge needle was inserted via inferolateral approach. The sites were each injected with pre-drawn 2 mL hyaluronic acid solution. The injection was completed without complication, and a bandage was applied.    The patient tolerated the procedure well and was instructed to avoid strenuous activity for the next 24-48 hours and to use ice or Tylenol for pain as needed. The patient will call immediately with any signs of infection or allergic reaction.    POST-INJECTION CARE: The patient tolerated the procedure well. An occlusive bandage was placed over the injection site. Post-injection care instructions provided to the patient. The patient was asked to avoid strenuous activity and continue to rest the area for 2-3 days before resuming regular activities. Patient advised that the area may be more painful for the first 1-2 days. They can use ice or Tylenol for pain as needed.  Patient was instructed to watch for fever, increased swelling, or persistent pain. The patient will call immediately with any signs of infection or allergic reaction.    COMPLICATIONS: The patient tolerated the procedure well without any complications.        Bjorn Ramirez DO, CAQSM   Primary Care Sports Medicine    Department of Orthopaedic Surgery  [facility]    [address]  [place] [address]  [address] [place] [address]    t: [phone]  f: [phone]      [email or web address]

## 2024-08-29 ENCOUNTER — OFFICE VISIT (OUTPATIENT)
Dept: ORTHOPEDICS CLINIC | Facility: CLINIC | Age: 69
End: 2024-08-29
Payer: MEDICARE

## 2024-08-29 VITALS — WEIGHT: 268 LBS | HEIGHT: 61 IN | BODY MASS INDEX: 50.6 KG/M2

## 2024-08-29 DIAGNOSIS — M16.0 PRIMARY OSTEOARTHRITIS OF BOTH HIPS: ICD-10-CM

## 2024-08-29 DIAGNOSIS — M43.06 LUMBAR SPONDYLOLYSIS: ICD-10-CM

## 2024-08-29 DIAGNOSIS — M17.12 PRIMARY OSTEOARTHRITIS OF LEFT KNEE: ICD-10-CM

## 2024-08-29 DIAGNOSIS — M17.11 PRIMARY OSTEOARTHRITIS OF RIGHT KNEE: Primary | ICD-10-CM

## 2024-08-29 DIAGNOSIS — M54.16 LUMBAR RADICULOPATHY: ICD-10-CM

## 2024-08-29 RX ORDER — TRAMADOL HYDROCHLORIDE 50 MG/1
TABLET ORAL
Qty: 10 TABLET | Refills: 1 | Status: SHIPPED | OUTPATIENT
Start: 2024-08-29

## 2024-08-29 RX ORDER — CYCLOBENZAPRINE HCL 10 MG
10 TABLET ORAL NIGHTLY
Qty: 20 TABLET | Refills: 0 | Status: SHIPPED | OUTPATIENT
Start: 2024-08-29 | End: 2024-09-18

## 2024-08-29 NOTE — PROGRESS NOTES
Sports Medicine Clinic Note    Subjective:    Chief Complaint: Follow-up for bilateral knee pain.    Interval History: The patient is a 68-year-old female who returns for follow-up regarding bilateral knee pain and to proceed with the viscosupplementation injections for which prior authorization has been obtained. She continues to experience significant discomfort in both knees but reports noticing some improvement already visco series. There has been no new trauma or injury.    Objective:    Bilateral Knee Examination:    Inspection:  No visible effusion or erythema noted.    Palpation:  Tenderness remains but improved over the medial joint lines bilaterally.  No tenderness over the lateral joint lines, patella, or tibial tubercle.    Range of Motion:  Flexion: 0-130 degrees with mild pain at end ranges.  Extension: 0 degrees.    Neurovascular:  Intact.    Diagnostic Tests:    No new testing.    X-rays previously reviewed showing advanced osteoarthritis with joint space narrowing and marginal osteophytes, most prominent in the medial tibiofemoral compartments bilaterally.    Assessment:    Bilateral knee osteoarthritis with ongoing pain despite conservative management.    Plan:    Therapy:  Continue physical therapy with a focus on hip and knee mobility, core strengthening, and exercises to alleviate lumbar radiculopathy.    Medications:  Continue current regimen: Tylenol and NSAIDs as needed. Lidocaine patches for lumbar region.    Procedures:  Proceed with viscosupplementation injections today for both knees as previously authorized. Plan for three injections per knee, one week apart.    Activity Recommendations:  Encourage weight loss with ongoing support through the weight loss clinic.  Advise the patient to avoid activities that exacerbate knee pain, such as prolonged standing or walking. Continue with gentle activities like swimming or cycling.    Follow-Up:  The patient is scheduled for completion of the  viscosupplementation series and to assess the response to treatment. Earlier follow-up if symptoms worsen or new symptoms arise.    Procedure Note:    After discussion of the risks and benefits, the patient elected to proceed with a therapeutic injection into the bilateral knee (tibiofemoral space). Confirmed that the patient does not have history of prior adverse reactions, active infections, or relevant allergies. There was no erythema or warmth, and the skin was clear.    For each side:    The skin was sterilized with ChloraPrep. A 22 gauge needle was inserted via inferolateral approach. The sites were each injected with pre-drawn 2 mL hyaluronic acid solution. The injection was completed without complication, and a bandage was applied.    The patient tolerated the procedure well and was instructed to avoid strenuous activity for the next 24-48 hours and to use ice or Tylenol for pain as needed. The patient will call immediately with any signs of infection or allergic reaction.    POST-INJECTION CARE: The patient tolerated the procedure well. An occlusive bandage was placed over the injection site. Post-injection care instructions provided to the patient. The patient was asked to avoid strenuous activity and continue to rest the area for 2-3 days before resuming regular activities. Patient advised that the area may be more painful for the first 1-2 days. They can use ice or Tylenol for pain as needed.  Patient was instructed to watch for fever, increased swelling, or persistent pain. The patient will call immediately with any signs of infection or allergic reaction.    COMPLICATIONS: The patient tolerated the procedure well without any complications.        Bjorn Ramirez DO, CAQSM   Primary Care Sports Medicine    Department of Orthopaedic Surgery  [facility]    [address] [place] [address]  [address] [place] [address]    t: [phone]  f:  [phone]      [email or web address]

## 2024-09-04 ENCOUNTER — OFFICE VISIT (OUTPATIENT)
Dept: RHEUMATOLOGY | Facility: CLINIC | Age: 69
End: 2024-09-04
Payer: MEDICARE

## 2024-09-04 VITALS
OXYGEN SATURATION: 93 % | WEIGHT: 273.13 LBS | HEART RATE: 77 BPM | HEIGHT: 61 IN | DIASTOLIC BLOOD PRESSURE: 84 MMHG | TEMPERATURE: 97 F | BODY MASS INDEX: 51.57 KG/M2 | RESPIRATION RATE: 16 BRPM | SYSTOLIC BLOOD PRESSURE: 156 MMHG

## 2024-09-04 DIAGNOSIS — R70.0 ELEVATED SED RATE: Primary | ICD-10-CM

## 2024-09-04 DIAGNOSIS — M17.0 PRIMARY OSTEOARTHRITIS OF BOTH KNEES: ICD-10-CM

## 2024-09-04 DIAGNOSIS — M16.11 OSTEOARTHRITIS OF RIGHT HIP, UNSPECIFIED OSTEOARTHRITIS TYPE: ICD-10-CM

## 2024-09-04 DIAGNOSIS — M47.816 OSTEOARTHRITIS OF LUMBAR SPINE, UNSPECIFIED SPINAL OSTEOARTHRITIS COMPLICATION STATUS: ICD-10-CM

## 2024-09-04 DIAGNOSIS — R09.82 POST-NASAL DRIP: ICD-10-CM

## 2024-09-04 DIAGNOSIS — Z79.1 NSAID LONG-TERM USE: ICD-10-CM

## 2024-09-04 PROCEDURE — 99204 OFFICE O/P NEW MOD 45 MIN: CPT | Performed by: INTERNAL MEDICINE

## 2024-09-04 NOTE — PATIENT INSTRUCTIONS
Could consider cymbalta for chronic musculoskeletal pain, but you would need to transition off Lexapro.     Discussed risk/benefits of NSAIDs.     Agree with weight loss clinic.

## 2024-09-04 NOTE — PROGRESS NOTES
Rheumatology New Patient Note  =====================================================================================================    Date of visit: 9/4/2024  ?  Chief complaint: elevated sed rate  Chief Complaint   Patient presents with    New Patient     New patient      ?  Referring (will send letter)  Consuelo Hogue    PCP  Isaura Nunn MD  Fax: [phone]  Phone: [phone]    =====================================================================================================  HPI    Tejal Franz is a 68 year old female     Here for evaluation of an elevated sed rate.   -April 13th: started to have ankles, knee hip pain. Knees burned. No shoulder pain. Outer and lateral thigh pain did hurt quite a bit.  Eventually diagnosed with lumbar DJD, right greater than left hip OA, bilateral knee OA.  Had epidural series by pain service to the back which did help the leg and pain.  -also had knee/hip injections (cortisone and gel to the knee)  -overall she is doing much better.   -taking diclofeanc twice daily and tylenol extra strength on a regular basis.  -no family hx of psoriasis or RA.   -notes eczema  -father with gout.  -did previously did do PT without significant relief. Taking a hiatus currently.     No giant cell arteritis symptoms: Denies headaches, tenderness jaw claudication, vision changes (diplopia, amaurosis fugax symptoms, visual field changes), PMR symptoms, fevers, chills, night sweats.    14 point ROS negative except noted above    Medications:  Current Outpatient Medications on File Prior to Visit   Medication Sig Dispense Refill    escitalopram (LEXAPRO) 20 MG Oral Tab Take 1 tablet (20 mg total) by mouth daily. 90 tablet 1    diclofenac 75 MG Oral Tab EC Take 1 tablet (75 mg total) by mouth 2 (two) times daily. 28 tablet 1    gabapentin 600 MG Oral Tab Take 1 tablet (600 mg total) by mouth 3 (three) times daily. 270 tablet 0    metFORMIN  MG Oral Tablet 24 Hr Take 1  tablet (500 mg total) by mouth in the morning and 1 tablet (500 mg total) before bedtime. 180 tablet 0    Acetaminophen Extra Strength 500 MG Oral Tab       Calcium Carbonate-Vitamin D 600-400 MG-UNIT Oral Tab Take 1 tablet by mouth daily.      Multiple Vitamins-Minerals (CENTRUM SILVER) Oral Tab Take 1 tablet by mouth daily.      cyclobenzaprine 10 MG Oral Tab Take 1 tablet (10 mg total) by mouth nightly for 20 days. (Patient not taking: Reported on 2024) 20 tablet 0    traMADol 50 MG Oral Tab Please take 1 to 2 tablets every 4 hours as needed for pain. (Patient not taking: Reported on 2024) 10 tablet 1     No current facility-administered medications on file prior to visit.       Past Medical History:  Past Medical History:    Arthritis    First Treatment by Dr. Prince    Depression    Obesity, unspecified     Past Surgical History:  Past Surgical History:   Procedure Laterality Date    Cataract            Other surgical history  1978    rectovag fistula    Other surgical history      gum surgery    Other surgical history      wisdom tooth     Family History:  Family History   Problem Relation Age of Onset    Heart Disorder Father         pacemaker    Other (Other) Father         COPD    Heart Disorder Mother         MI; smoker    Depression Mother     Diabetes Mother     Obesity Mother     Psychiatric Mother     Cancer Paternal Grandmother     Depression Sister     Obesity Sister     Psychiatric Sister      Social History:  Social History     Socioeconomic History    Marital status:     Number of children: 2   Occupational History    Occupation: office work   Tobacco Use    Smoking status: Former     Current packs/day: 0.00     Average packs/day: 1.8 packs/day for 32.0 years (57.6 ttl pk-yrs)     Types: Cigarettes     Start date: 1972     Quit date: 2004     Years since quittin.6    Smokeless tobacco: Never    Tobacco comments:     quit about 8 years ago     Updated 24    Vaping Use    Vaping status: Never Used   Substance and Sexual Activity    Alcohol use: Yes     Alcohol/week: 2.0 standard drinks of alcohol     Types: 2 Glasses of wine per week     Comment: I only have alcohol occasionally.    Drug use: Never    Sexual activity: Not Currently   Other Topics Concern    Caffeine Concern No    Stress Concern No    Weight Concern Yes    Special Diet No    Exercise No    Seat Belt No     ?  Allergies:  No Known Allergies      Objective    Vitals:    09/04/24 1211   BP: 156/84   Pulse: 77   Resp: 16   Temp: 97.4 °F (36.3 °C)   SpO2: 93%   Weight: 273 lb 1.9 oz (123.9 kg)   Height: 5' 1\" (1.549 m)       GEN: NAD, well-nourished.   HEENT: Head: NCAT. Face: No lesions. Eyes: Conjunctiva clear. Sclera are anicteric. PERRLA. EOMs are full. Ears: The right and left ear canals are clear.  Nose: No external or internal nasal deformities. Nasal septum is midline. Mouth: The lips are within normal limits.  No oral ulcers Tongue is midline with no lesions. The oral cavity is clear.  -No scalp tenderness  Neck: Supple. No neck masses. No thyromegaly. No LAD, parotid or submandicular gland palpated.   CV: RRR, no mrg, S1/S2  PULM: CTAB, no wrr, easy effort  Extremities: No cyanosis, edema or deformities.   Neurologic: Strength, CN2-12 grossly intact   Psych: normal affect.   Skin: No lesions or rashes.  MSK: 28 joint count performed. No evidence of synovitis in mcp, pip, dip, wrist, elbows, shoulders, hips, knees, ankles, mtp unless otherwise noted. Full ROM of elbows, wrists, knees.     No painful arc of the bilateral shoulders.  Bilateral shoulders full range of motion  -No peripheral joint synovitis  -Tender palpation in the bilateral lumbar paraspinals  -Bilateral knee OA changes  -Pain with logroll of the bilateral hips.  Logroll is limiting at 5 degrees on the right and 10 degrees on the left    ?  Labs:    5/2024  DELISA, RF, CCP neg  Uric acid wnl    Lab Results   Component Value Date     ESRML 39 (H) 07/31/2024    ESRML 47 (H) 06/26/2024    ESRML 63 (H) 06/11/2024    ESRML 49 (H) 05/14/2024    CRP 1.30 (H) 05/14/2024          Lab Results   Component Value Date    WBC 8.0 05/14/2024    RBC 4.92 05/14/2024    HGB 14.3 05/14/2024    HCT 45.0 05/14/2024    .0 05/14/2024    MCV 91.5 05/14/2024    MCH 29.1 05/14/2024    MCHC 31.8 05/14/2024    RDW 13.0 05/14/2024    NEPRELIM 4.27 05/14/2024    NEPERCENT 53.0 05/14/2024    LYPERCENT 33.5 05/14/2024    MOPERCENT 10.1 05/14/2024    EOPERCENT 2.6 05/14/2024    BAPERCENT 0.4 05/14/2024    NE 4.27 05/14/2024    LYMABS 2.69 05/14/2024    MOABSO 0.81 05/14/2024    EOABSO 0.21 05/14/2024    BAABSO 0.03 05/14/2024     Lab Results   Component Value Date    GLU 84 05/14/2024    BUN 27 (H) 05/14/2024    CREATSERUM 0.85 05/14/2024    ANIONGAP 6 05/14/2024    CA 9.4 05/14/2024    OSMOCALC 292 05/14/2024    ALKPHO 73 05/14/2024    AST 25 05/14/2024    ALT 25 05/14/2024    BILT 0.6 05/14/2024    TP 7.9 05/14/2024    ALB 3.5 05/14/2024    GLOBULIN 4.4 05/14/2024     05/14/2024    K 4.6 05/14/2024     05/14/2024    CO2 26.0 05/14/2024         No results found for: \"ANATI\", \"DELISA\", \"ANAS\", \"ANASCRN\", \"ANASCRNRFLX\", \"BRADLEY\"  No results found for: \"SSA\", \"SSAUR\", \"ANTISSA\", \"SSA52\", \"SSA60\", \"SSADD\", \"SSB\", \"ANTISSB\"  No results found for: \"DSDNA\", \"ANTIDSDNA\", \"SMUD\", \"ANTISM\", \"SM\", \"RNP\", \"ANTIRNP\", \"SMITHRNP\"  No results found for: \"SCL70\", \"SCL\", \"IIGIYUB12\"  No results found for: \"C3\", \"C4\"  No results found for: \"DRVVT\", \"LAINT\", \"PTTLUPUS\", \"LUPUSINTERP\", \"LA\", \"M8VE5KTKIC\", \"I9SY6PETLD\", \"X5WFDRAJQB\", \"V6ZKKRIRNX\"  No results found for: \"CARDIOLIPIGG\", \"CARDIOLIPIGM\", \"CARDIOLIPIGA\", \"CARDIOIGA\", \"CARLIP\"      Additional Labs:    Radiology:    Radiology review:      =====================================================================================================  Assessment and Plan    Assessment:  1. Elevated sed rate    2. Post-nasal drip    3.  Osteoarthritis of lumbar spine, unspecified spinal osteoarthritis complication status    4. Osteoarthritis of right hip, unspecified osteoarthritis type    5. Primary osteoarthritis of both knees    6. BMI 50.0-59.9, adult (HCC)      Mildly elevated sed rate that has now normalized on most recent check as upper limit of normal for a female is (age +10)/2.  CRP elevation likely due to cytokine release from adipose tissue.  No clinical features to suggest polymyalgia rheumatica/giant cell arteritis or another inflammatory arthritis.    Reviewed lumbar spine x-rays which demonstrated significant DJD at L5/S1.  Hip x-rays demonstrate right greater than left moderate arthritis.  Knee x-rays demonstrate right greater than left mild/moderate medial knee osteoarthritis.  -Arthralgia due to more mechanical causes of the degenerative nature.  -BMI 51 and inactivity is not helping matters.    #Chronic cough: Likely due to postnasal drip  ?  Plan:  -Repeat sed rate, CRP given she feels much better now to establish a baseline for the future.  Add on SPEP.  Sed rate can be elevated due to occult malignancy.  Recommend age-appropriate cancer screening.  Patient declines age-appropriate cancer screening as if there was a malignancy that was screened and found she would decline any treatment for this.  -Patient is on diclofenac 75 mg twice daily which is helping her articular pain.  Discussed long-term side effects including increased risk of MACE, renal injury, gastritis/ulcer.  Patient is not worried about these potential severe side effects and would rather live with control of pain rather than be worried about any medication side effects however severe.  Could consider Cymbalta to down regulate any pain amplification but she would need to get off Lexapro which the patient is hesitant to do so since she has been on that medication for quite some time.  -Keep a close eye on blood pressure given SBP greater than 150.  Perhaps  related to NSAIDs.  -She continues on regular Tylenol  -Agree with weight loss clinic evaluation.  I think her pain would benefit from GLP-1 agonist given BMI is greater than 50 to unload her weightbearing joints    RTC as needed      Total time spent by me on DOS was 45 min  This includes:   Preparing to see the patient (review of tests, prior medical visits)  Obtaining and/or reviewing separately medically appropriate obtained history  Performing the medically appropriate exam and/or evaluation  Clinical documentation in the EHR or other health record  Counseling and educating the patient or caregiver  Interpreting results and/or communicating results to the patient/family/caregiver  Ordering medications, tests, or procedures  Referring and communicating with other health care professionals  Documentation in EHR      Diagnoses and all orders for this visit:    Post-nasal drip  -     Sed Rate, Westergren (Automated); Future  -     C-Reactive Protein; Future  -     Monoclonal Protein Study; Future    Osteoarthritis of lumbar spine, unspecified spinal osteoarthritis complication status  -     Sed Rate, Westergren (Automated); Future  -     C-Reactive Protein; Future  -     Monoclonal Protein Study; Future    Osteoarthritis of right hip, unspecified osteoarthritis type  -     Sed Rate, Westergren (Automated); Future  -     C-Reactive Protein; Future  -     Monoclonal Protein Study; Future    Elevated sed rate  -     Sed Rate, Westergren (Automated); Future  -     C-Reactive Protein; Future  -     Monoclonal Protein Study; Future    Primary osteoarthritis of both knees  -     Sed Rate, Westergren (Automated); Future  -     C-Reactive Protein; Future  -     Monoclonal Protein Study; Future        No follow-ups on file.      The above plan of care, diagnosis, orders, and follow-up were discussed with the patient. Questions related to this recommended plan of care were answered.    Thank you for referring this delightful  patient to me. Please feel free to contact me with any questions.     This report was performed utilizing speech recognition software technology. Despite proofreading, speech recognition errors could escape detection. If a word or phrase is confusing or out of context, please do not hesitate to call for   clarification.       Kind regards      Osmany Patterson MD  [facility]

## 2024-09-05 ENCOUNTER — OFFICE VISIT (OUTPATIENT)
Dept: ORTHOPEDICS CLINIC | Facility: CLINIC | Age: 69
End: 2024-09-05
Payer: MEDICARE

## 2024-09-05 VITALS — BODY MASS INDEX: 51.54 KG/M2 | HEIGHT: 61 IN | WEIGHT: 273 LBS

## 2024-09-05 DIAGNOSIS — M17.11 PRIMARY OSTEOARTHRITIS OF RIGHT KNEE: Primary | ICD-10-CM

## 2024-09-05 DIAGNOSIS — M17.12 PRIMARY OSTEOARTHRITIS OF LEFT KNEE: ICD-10-CM

## 2024-09-05 PROCEDURE — 20610 DRAIN/INJ JOINT/BURSA W/O US: CPT | Performed by: FAMILY MEDICINE

## 2024-09-05 NOTE — PROGRESS NOTES
Sports Medicine Clinic Note    Subjective:    Chief Complaint: Follow-up for bilateral knee pain.    Interval History: The patient is a 68-year-old female who returns for follow-up regarding bilateral knee pain and to proceed with the viscosupplementation injections for which prior authorization has been obtained. She continues to experience significant discomfort in both knees but reports noticing some further improvement with visco series. There has been no new trauma or injury.    Objective:    Bilateral Knee Examination:    Inspection:  No visible effusion or erythema noted.    Palpation:  Tenderness remains but improved over the medial joint lines bilaterally.  No tenderness over the lateral joint lines, patella, or tibial tubercle.    Range of Motion:  Flexion: 0-130 degrees with mild pain at end ranges.  Extension: 0 degrees.    Neurovascular:  Intact.    Diagnostic Tests:    No new testing.    X-rays previously reviewed showing advanced osteoarthritis with joint space narrowing and marginal osteophytes, most prominent in the medial tibiofemoral compartments bilaterally.    Assessment:    Bilateral knee osteoarthritis with ongoing pain despite conservative management.    Plan:    Therapy:  Continue physical therapy with a focus on hip and knee mobility, core strengthening, and exercises to alleviate lumbar radiculopathy.    Medications:  Continue current regimen: Tylenol and NSAIDs as needed. Lidocaine patches for lumbar region.    Procedures:  Proceed with final round of viscosupplementation injections today for both knees as previously authorized.    Activity Recommendations:  Encourage weight loss with ongoing support through the weight loss clinic.  Advise the patient to avoid activities that exacerbate knee pain, such as prolonged standing or walking. Continue with gentle activities like swimming or cycling.    Follow-Up:  The patient is scheduled for completion of the viscosupplementation series and to  assess the response to treatment. Earlier follow-up if symptoms worsen or new symptoms arise.    Procedure Note:    After discussion of the risks and benefits, the patient elected to proceed with a therapeutic injection into the bilateral knee (tibiofemoral space). Confirmed that the patient does not have history of prior adverse reactions, active infections, or relevant allergies. There was no erythema or warmth, and the skin was clear.    For each side:    The skin was sterilized with ChloraPrep. A 22 gauge needle was inserted via inferolateral approach. The sites were each injected with pre-drawn 2 mL hyaluronic acid solution. The injection was completed without complication, and a bandage was applied.    The patient tolerated the procedure well and was instructed to avoid strenuous activity for the next 24-48 hours and to use ice or Tylenol for pain as needed. The patient will call immediately with any signs of infection or allergic reaction.    POST-INJECTION CARE: The patient tolerated the procedure well. An occlusive bandage was placed over the injection site. Post-injection care instructions provided to the patient. The patient was asked to avoid strenuous activity and continue to rest the area for 2-3 days before resuming regular activities. Patient advised that the area may be more painful for the first 1-2 days. They can use ice or Tylenol for pain as needed.  Patient was instructed to watch for fever, increased swelling, or persistent pain. The patient will call immediately with any signs of infection or allergic reaction.    COMPLICATIONS: The patient tolerated the procedure well without any complications.        Bjorn Ramirez DO, CAQSM   Primary Care Sports Medicine    Department of Orthopaedic Surgery  [facility]    [address] [place] [address]  [address] [place] [address]    t: [phone]  f: [phone]      [facility]

## 2024-09-09 RX ORDER — DICLOFENAC SODIUM 75 MG/1
75 TABLET, DELAYED RELEASE ORAL 2 TIMES DAILY
Qty: 28 TABLET | Refills: 1 | Status: SHIPPED | OUTPATIENT
Start: 2024-09-09

## 2024-09-09 NOTE — TELEPHONE ENCOUNTER
Diclofenac    DOS: n/a  Last OV: 9/5/24 Primary OA bilateral knee  Last refill date: 8/4/24 #/refills: 60/0  Upcoming appt:   Future Appointments   Date Time Provider Department Center   11/20/2024 10:00 AM Bjorn Ramirez, DO [facility] [address]   12/6/2024 10:00 AM Bjorn Ramirez, DO [facility] [address]     Component      Latest Ref Rng 5/14/2024   Glucose      70 - 99 mg/dL 84    Sodium      136 - 145 mmol/L 139    Potassium      3.5 - 5.1 mmol/L 4.6    Chloride      98 - 112 mmol/L 107    Carbon Dioxide, Total      21.0 - 32.0 mmol/L 26.0    ANION GAP      0 - 18 mmol/L 6    BUN      9 - 23 mg/dL 27 (H)    CREATININE      0.55 - 1.02 mg/dL 0.85    CALCIUM      8.5 - 10.1 mg/dL 9.4    CALCULATED OSMOLALITY      275 - 295 mOsm/kg 292    EGFR      >=60 mL/min/1.73m2 74    AST (SGOT)      15 - 37 U/L 25    ALT (SGPT)      13 - 56 U/L 25    ALKALINE PHOSPHATASE      55 - 142 U/L 73    Total Bilirubin      0.1 - 2.0 mg/dL 0.6    PROTEIN, TOTAL      6.4 - 8.2 g/dL 7.9    Albumin      3.4 - 5.0 g/dL 3.5    Globulin      2.8 - 4.4 g/dL 4.4    A/G Ratio      1.0 - 2.0  0.8 (L)    Patient Fasting for CMP? No       Legend:  (H) High  (L) Low

## 2024-09-12 ENCOUNTER — PATIENT MESSAGE (OUTPATIENT)
Dept: ORTHOPEDICS CLINIC | Facility: CLINIC | Age: 69
End: 2024-09-12

## 2024-09-12 ENCOUNTER — TELEPHONE (OUTPATIENT)
Dept: PAIN CLINIC | Facility: CLINIC | Age: 69
End: 2024-09-12

## 2024-09-12 ENCOUNTER — OFFICE VISIT (OUTPATIENT)
Dept: FAMILY MEDICINE CLINIC | Facility: CLINIC | Age: 69
End: 2024-09-12
Payer: MEDICARE

## 2024-09-12 VITALS
RESPIRATION RATE: 16 BRPM | DIASTOLIC BLOOD PRESSURE: 82 MMHG | SYSTOLIC BLOOD PRESSURE: 118 MMHG | HEIGHT: 61 IN | WEIGHT: 275 LBS | BODY MASS INDEX: 51.92 KG/M2 | HEART RATE: 72 BPM | TEMPERATURE: 98 F

## 2024-09-12 DIAGNOSIS — E66.01 OBESITY, MORBID, BMI 50 OR HIGHER (HCC): Primary | ICD-10-CM

## 2024-09-12 DIAGNOSIS — M54.16 LUMBAR RADICULITIS: Primary | ICD-10-CM

## 2024-09-12 DIAGNOSIS — M17.0 PRIMARY OSTEOARTHRITIS OF BOTH KNEES: ICD-10-CM

## 2024-09-12 DIAGNOSIS — M16.0 PRIMARY OSTEOARTHRITIS OF BOTH HIPS: ICD-10-CM

## 2024-09-12 DIAGNOSIS — R73.03 PREDIABETES: ICD-10-CM

## 2024-09-12 PROCEDURE — 99214 OFFICE O/P EST MOD 30 MIN: CPT | Performed by: NURSE PRACTITIONER

## 2024-09-12 PROCEDURE — G2211 COMPLEX E/M VISIT ADD ON: HCPCS | Performed by: NURSE PRACTITIONER

## 2024-09-12 RX ORDER — METFORMIN HYDROCHLORIDE 750 MG/1
750 TABLET, EXTENDED RELEASE ORAL 2 TIMES DAILY WITH MEALS
Qty: 60 TABLET | Refills: 1 | Status: SHIPPED | OUTPATIENT
Start: 2024-09-12

## 2024-09-12 NOTE — TELEPHONE ENCOUNTER
Left TLESI completed on 8/5/24.     LOV 8/19/24:    ASSESSMENT AND PLAN:      1. Lumbar radiculitis          The patient is a 68-year-old with a history of lumbar degenerative disc disease and foraminal stenosis.  The patient is following up after transforaminal epidural steroid injection with significant improvement in radicular symptoms.  Overall, very pleased with results of the procedure.  Denies any complications.  Discussed role for surgical decompression versus repeat injection in the future.  Patient would like to follow-up on as-needed basis.        Radiology orders and consultations:None  The patient indicates understanding of these issues and agrees to the plan.  No follow-ups on file.     Blaze Guido MD, 8/19/2024, 3:11 PM

## 2024-09-12 NOTE — TELEPHONE ENCOUNTER
Returned patient call - relayed 's recommendations to schedule injection until November. Patient VU.    Patient advised of insurance approval to proceed with injections and is agreeable to scheduling. Patient scheduled for procedure, pre-procedure instructions reviewed. Patient prefers Local sedation. Reviewed sedation instructions including No Fasting & No  Required. Patient encouraged but not required to hold Diclofenac for 24 hours prior to procedure. Patient verbalized understanding of instructions, no further needs at this time.      [facility]  PRE-PROCEDURE INSTRUCTIONS WITHOUT SEDATION    Procedure: Left L5 TLESI       Appointment Date: 11/07/2024  Check-In Time: 10:30 AM      Prior to the procedure:  Please update us prior to the procedure if you are experiencing any symptoms of infection such as cough, fever, chills, urinary symptoms, or have recently been prescribed antibiotics, have open wounds, have recently had surgery or dental procedures.    Day of Procedure:  **Drivers will be required for patients who receive prescriptions for Valium.    NO FASTING REQUIRED  Please bring your Insurance Card, Photo ID, List of Current Medications and Referral (if applicable) to your appointment.  Please park in the [facility] [facility] and follow the signs to the [facility].  Check in at [facility] ([address] [place] [address]) outpatient registration in the [facility].  Please note-No prescriptions will be written by Pain Clinic in OR on the day of procedure. If you require a refill of medications, please contact the office 48 hours prior to your procedure.  If you have an implanted Spinal Cord or Peripheral Nerve Stimulator: Please remember to turn device off for procedure.        Medication Hold:    Number of days you need to be off for the following medications:    Aggrenox 10 days   Agrylin (Anagrelide) 10 days  Brilinta (Ticagrelor) 7 days  Imbruvica (Ibrutinib) 3  days   Enbrel (Etanercept) 24 hours   Fragmin (Dalteparin) 24 hours   Pletal (Cilostazol) 7 days  Effient (Prasugrel) 7 days  Pradaxa 10 days  Trental 7 days  Eliquis (Apixaban) 3 days  Xarelto (Rivaroxaban) 3 days  Lovenox (Enoxaparin) 24 hours  Aspirin  Greater than 81mg but less than 325mg   5 days  325mg and greater                  7 days  Coumadin       5 days  Procedure may be cancelled if INR is elevated.   Excedrin (with aspirin) 7 days  Plavix (Clopidogrel)                            7 days    NSAIDs: 24 hours preferred      Ibuprofen (Motrin, Advil, Vicoprofen), Naproxen (Naprosyn, Aleve), Piroxcam (Feldene), Meloxicam (Mobic), Oxaprozin (Daypro), Diclofenac (Voltaren), Indomethacin (Indocin), Etodolac (Lodine), Nabumetone (Relafen), Celebrex (Celecoxib)           HERBAL SUPPLEMENTS  5 days preferred  Fish oil, krill oil, Omega-3, Vascepa, Vitamin E, Turmeric, Garlic                       Insurance Authorization:   Most insurances are now requiring a preauthorization for all procedures.  In the event that your insurance does not authorize your procedure within 48 hours of the scheduled date, your procedure will be cancelled and rescheduled to a later date.  Please contact your insurance carrier to determine what your financial responsibility will be for the procedure(s).      Cancellation/Rescheduling Appointment:   In the event you need to cancel or reschedule your appointment, you must notify the office 24 hours prior.    Post-procedure instructions:        Please schedule a follow up visit within 2 to 4 weeks after your last procedure date   Please call our office with any questions or concerns before or after your procedure at  [phone].  If you are a diabetic, please increase the frequency of your glucose monitoring after the procedure as this may cause a temporary increase in your blood sugar.  Contact your primary care physician if your blood sugar rises as you may require some medication  adjustment.  It is normal to have increased pain at injection site for up to 3-5 days after procedure, you can use heat or ice (20 minutes on 20 minutes off) for comfort.    **To hear a recorded version of these instructions, please call [phone] and follow the prompts.  **Para escuchar las instrucciones en Español, por favor de llamar el jimy [phone] opción 4.

## 2024-09-12 NOTE — TELEPHONE ENCOUNTER
From: Tejal Franz  To: Bjorn POST  Sent: 9/12/2024 10:21 AM CDT  Subject: Questions - Pain Meds and Hip Pain    Last night the pain in my left leg came back. I have a call into Dr. Guido's office to schedule another Transforaminal Epidural Injection. Since I can only have this every 3 months, that appointment won't be until November. I just took one of the Tramadol (sp?) to see if that help. I have a couple of friends that are taking this medication on a regular basis. Is it possible for me to take this on a regular basis, as needed for pain? Is it ok to take this and also take Tylenol?

## 2024-09-12 NOTE — TELEPHONE ENCOUNTER
Contacted patient to schedule repeat TLESI - patient mentioned that she thought she could repeat TLESI until November? Patient mentioned that at her last OV w/ he told her this.     Advised patient that will check with  and call her back. Patient ARVIND.

## 2024-09-12 NOTE — PROGRESS NOTES
Tejal Franz is a 69 year old female.  HPI:   Follow up on Metformin. Started for pre-diabetes and weight loss. Patients A1c 5.8%. Does feel she is an emotional eater. She denies any side effects of the Metformin. Insurance would not cover Zepbound or Wegovy per her report. Has been seeing Dr. Guido/Dr. Ramirez. Exercise- limited due to osteoarthritis and pain. Using walker today.    Wt Readings from Last 6 Encounters:   09/12/24 275 lb (124.7 kg)   09/05/24 273 lb (123.8 kg)   09/04/24 273 lb 1.9 oz (123.9 kg)   08/29/24 268 lb (121.6 kg)   08/22/24 268 lb (121.6 kg)   08/20/24 268 lb (121.6 kg)       Current Outpatient Medications   Medication Sig Dispense Refill    metFORMIN  MG Oral Tablet 24 Hr Take 1 tablet (750 mg total) by mouth 2 (two) times daily with meals. 60 tablet 1    diclofenac 75 MG Oral Tab EC Take 1 tablet (75 mg total) by mouth 2 (two) times daily. 28 tablet 1    cyclobenzaprine 10 MG Oral Tab Take 1 tablet (10 mg total) by mouth nightly for 20 days. 20 tablet 0    escitalopram (LEXAPRO) 20 MG Oral Tab Take 1 tablet (20 mg total) by mouth daily. 90 tablet 1    gabapentin 600 MG Oral Tab Take 1 tablet (600 mg total) by mouth 3 (three) times daily. 270 tablet 0    Calcium Carbonate-Vitamin D 600-400 MG-UNIT Oral Tab Take 1 tablet by mouth daily.      Multiple Vitamins-Minerals (CENTRUM SILVER) Oral Tab Take 1 tablet by mouth daily.      traMADol 50 MG Oral Tab Please take 1 to 2 tablets every 4 hours as needed for pain. (Patient not taking: Reported on 9/4/2024) 10 tablet 1      Past Medical History:    Arthritis    First Treatment by Dr. Prince    Depression    Obesity, unspecified      Social History:  Social History     Socioeconomic History    Marital status:     Number of children: 2   Occupational History    Occupation: office work   Tobacco Use    Smoking status: Former     Current packs/day: 0.00     Average packs/day: 1.8 packs/day for 32.0 years (57.6 ttl pk-yrs)      Types: Cigarettes     Start date: 1972     Quit date: 2004     Years since quittin.7    Smokeless tobacco: Never    Tobacco comments:     quit about 8 years ago     Updated 24   Vaping Use    Vaping status: Never Used   Substance and Sexual Activity    Alcohol use: Yes     Alcohol/week: 2.0 standard drinks of alcohol     Types: 2 Glasses of wine per week     Comment: I only have alcohol occasionally.    Drug use: Never    Sexual activity: Not Currently   Other Topics Concern    Caffeine Concern No    Stress Concern No    Weight Concern Yes    Special Diet No    Exercise No    Seat Belt No        REVIEW OF SYSTEMS:   GENERAL HEALTH: feels well otherwise  RESPIRATORY: denies shortness of breath with exertion  CARDIOVASCULAR: denies chest pain on exertion  GI: denies abdominal pain and denies heartburn  NEURO: denies headaches  Musculoskeletal: No motor deficits  EXAM:   /82   Pulse 72   Temp 97.5 °F (36.4 °C) (Temporal)   Resp 16   Ht 5' 1\" (1.549 m)   Wt 275 lb (124.7 kg)   BMI 51.96 kg/m²   GENERAL: well developed, well nourished,in no apparent distress  EYES:  sclera clear without injection  NECK: supple,no adenopathy, thyroid normal to palpation  LUNGS: clear to auscultation no rales, rhonchi or wheezes  CARDIO: RRR without murmur  EXTREMITIES: no cyanosis, clubbing or edema +2 posterior tibial pulses  Musculoskeletal: No gross deficit  Psychological: Mood and affect are normal.  Good communication skills.  ASSESSMENT AND PLAN:     Encounter Diagnoses   Name Primary?    Obesity, morbid, BMI 50 or higher (HCC) Yes    Prediabetes     Primary osteoarthritis of both hips     Primary osteoarthritis of both knees        1. Obesity, morbid, BMI 50 or higher (HCC)  - metFORMIN  MG Oral Tablet 24 Hr; Take 1 tablet (750 mg total) by mouth 2 (two) times daily with meals.  Dispense: 60 tablet; Refill: 1    2. Prediabetes  - metFORMIN  MG Oral Tablet 24 Hr; Take 1 tablet (750 mg total)  by mouth 2 (two) times daily with meals.  Dispense: 60 tablet; Refill: 1    3. Primary osteoarthritis of both hips    4. Primary osteoarthritis of both knees       No orders of the defined types were placed in this encounter.      Meds & Refills for this Visit:  Requested Prescriptions     Signed Prescriptions Disp Refills    metFORMIN  MG Oral Tablet 24 Hr 60 tablet 1     Sig: Take 1 tablet (750 mg total) by mouth 2 (two) times daily with meals.       Imaging & Consults:  None    Follow Up with:  No follow-up provider specified.      Increase metformin to Metformin 750 mg twice daily.  Continue to focus on healthy eating, routine exercise as able and weight loss.  Continue with plan of seeing the weight loss clinic 10/2024.  Continue follow up with Dr. Ramirez and Dr. Guido.      The patient indicates understanding of these issues and agrees to the plan.

## 2024-09-12 NOTE — TELEPHONE ENCOUNTER
Order Questions    Question Answer   Anesthesia Type Local   Provider Hay   Location [facility]   Procedure Transforaminal   Laterality/Level left L5   CPT (Hit enter after each entry) INJECTION, ANESTHETIC/STEROID, TRANSFORAMINAL EPIDURAL; LUMBAR/SACRAL, SINGLE LEVEL   Medical clearance requested (will send to Pain Navigator) No   Patient has Medicare coverage? Yes   Comments (Please list entire procedure name here.) left lumbar 5 transforaminal epidural steroid injection

## 2024-09-13 NOTE — TELEPHONE ENCOUNTER
Pt is not able to get in with Dr. Guido for her next injection until November.  Is there something more for pain that she might be able to take?

## 2024-09-15 NOTE — TELEPHONE ENCOUNTER
On gabapentin, can take Tylenol, diclofenac together and she has flexeril and tramadol for severe breakthrough should be a lot more than tylenol available to her

## 2024-10-01 RX ORDER — DICLOFENAC SODIUM 75 MG/1
75 TABLET, DELAYED RELEASE ORAL 2 TIMES DAILY
Qty: 28 TABLET | Refills: 1 | Status: SHIPPED | OUTPATIENT
Start: 2024-10-01

## 2024-10-01 NOTE — TELEPHONE ENCOUNTER
Diclofenac 75 mg  DOS: N/A  Last OV: 09/05/24  Last refill date: 09/09/24     #/refills: 28/1  Upcoming appt:   Future Appointments   Date Time Provider Department Center   10/28/2024  1:20 PM Eva Springer APRN [id number]   11/20/2024 10:00 AM Bjorn Ramirez, DO [facility] [address]   12/6/2024 10:00 AM Bjorn Ramirez, DO [facility] [address]   2/19/2025 12:30 PM Shailesh Valadez APRN [facility] [place]     05/14/24  BUN  9 - 23 mg/dL 27 High    Creatinine  0.55 - 1.02 mg/dL 0.85     eGFR-Cr  >=60 mL/min/1.73m2 74   AST  15 - 37 U/L 25   ALT  13 - 56 U/L 25

## 2024-10-28 ENCOUNTER — OFFICE VISIT (OUTPATIENT)
Dept: INTERNAL MEDICINE CLINIC | Facility: CLINIC | Age: 69
End: 2024-10-28
Payer: MEDICARE

## 2024-10-28 VITALS
WEIGHT: 289 LBS | SYSTOLIC BLOOD PRESSURE: 150 MMHG | BODY MASS INDEX: 54.56 KG/M2 | HEART RATE: 82 BPM | HEIGHT: 61 IN | DIASTOLIC BLOOD PRESSURE: 86 MMHG | RESPIRATION RATE: 20 BRPM

## 2024-10-28 DIAGNOSIS — F32.A DEPRESSION, UNSPECIFIED DEPRESSION TYPE: ICD-10-CM

## 2024-10-28 DIAGNOSIS — R73.03 PREDIABETES: ICD-10-CM

## 2024-10-28 DIAGNOSIS — E66.813 CLASS 3 SEVERE OBESITY WITH SERIOUS COMORBIDITY AND BODY MASS INDEX (BMI) OF 50.0 TO 59.9 IN ADULT, UNSPECIFIED OBESITY TYPE (HCC): ICD-10-CM

## 2024-10-28 DIAGNOSIS — E66.01 CLASS 3 SEVERE OBESITY WITH SERIOUS COMORBIDITY AND BODY MASS INDEX (BMI) OF 50.0 TO 59.9 IN ADULT, UNSPECIFIED OBESITY TYPE (HCC): ICD-10-CM

## 2024-10-28 DIAGNOSIS — M15.9 OSTEOARTHRITIS OF MULTIPLE JOINTS, UNSPECIFIED OSTEOARTHRITIS TYPE: ICD-10-CM

## 2024-10-28 DIAGNOSIS — Z51.81 ENCOUNTER FOR THERAPEUTIC DRUG MONITORING: Primary | ICD-10-CM

## 2024-10-28 DIAGNOSIS — F50.9 EATING DISORDER, UNSPECIFIED TYPE: ICD-10-CM

## 2024-10-28 PROCEDURE — 99215 OFFICE O/P EST HI 40 MIN: CPT | Performed by: NURSE PRACTITIONER

## 2024-10-28 RX ORDER — NALTREXONE HYDROCHLORIDE 50 MG/1
25 TABLET, FILM COATED ORAL DAILY
Qty: 15 TABLET | Refills: 0 | Status: SHIPPED | OUTPATIENT
Start: 2024-10-28

## 2024-10-28 RX ORDER — METFORMIN HYDROCHLORIDE 500 MG/1
500 TABLET, EXTENDED RELEASE ORAL 2 TIMES DAILY
Qty: 180 TABLET | Refills: 0 | OUTPATIENT
Start: 2024-10-28

## 2024-10-28 RX ORDER — BUPROPION HYDROCHLORIDE 150 MG/1
150 TABLET ORAL EVERY MORNING
Qty: 30 TABLET | Refills: 0 | Status: SHIPPED | OUTPATIENT
Start: 2024-10-28

## 2024-10-28 NOTE — PROGRESS NOTES
HISTORY OF PRESENT ILLNESS  Chief Complaint   Patient presents with    Weight Problem     Ortho referral. Metformin for weight loss in past with SE. No regular exercise  Waist 57\"  Neck 14.5\"  49.5% bodyfat       Tejal Franz is a 69 year old female new to our office today for initiation of medical weight loss program, referred by specialist.  Patient presents today with c/o excess weight starting in childhood. Highest weight over 300#.    Reason/goal for weight loss: lose 40# in 6 months and 80# in 1 year    Previous weight loss efforts in the past: RENAY, Sun Carmichael, Metformin- SEs    Past 6 months lifestyle interventions: none    Reviewed [facility] patient contract. Readiness for Lifestyle change: 7/10, Interest in Medication: 10/10, Bariatric surgery interest: 0/10.    Barriers to weight loss: portions, cravings for sweets, emotional eating    Wt Readings from Last 6 Encounters:   10/28/24 289 lb (131.1 kg)   09/12/24 275 lb (124.7 kg)   09/05/24 273 lb (123.8 kg)   09/04/24 273 lb 1.9 oz (123.9 kg)   08/29/24 268 lb (121.6 kg)   08/22/24 268 lb (121.6 kg)          Social hx and lifestyle reviewed:    How many meals do you eat out per week: all meals are frozen  Who is the primary cook in your home: no one- does not enjoy cooking    Please respond to the questions regarding your previous weight loss    How did you hear about the [facility]? Specialist   Previous weight loss efforts in the past/medication(s): Lost significant amount of weight several times.  Tried Metformin, but started causing diarrhea when eating salad.   Eating behaviors/patterns that have been barriers to weight loss success in the past: Emotional eating/binge eating   Please respond to the questions regarding a 24 hour food journal.  Include the average time you ate and the quantity/food preparation method.    List foods, qty and prep for breakfast: Smart Ones breakfast, Greek yogurt, protein bar   List foods, qty and prep for  lunch. Smart Ones or Lean Cuisine, Salad, bagel or tortilla w/turkey   List foods, qty and prep for dinner. Smart Ones or Lean Cuisine, frozen vegetables, frozen meatballs, frozen chicken nuggets   List foods, qty and prep for snacks. Fruit, protein bar, Like Air snacks, Fruit Loops snack, Pure Protein chips   List the types and qty of fluids consumed 2 cups of coffee, water throughout the day   Please respond to the questions regarding lifestyle.    Tobacco use: No   Alcohol use: How many servings per week? 1   Supplements taken on a regular basis include: Multi Vitamin, Calcium with Vitamin D   Please respond to the questions regarding exercise/activity    How many days per week are you active or exercise 0   What type of activities:    Perceived level of exertion on a scale of 1-5, with 5 being very intense: 1   Average stress level on a scale of 1-10, with 10 being extremely stressed: 6- therapy regularly   How do you cope with stress: Binge eat, monthly therapy   Please respond to the questions regarding sleep    How many hours of uninterrupted sleep do you get a night: 6   How many times do you wake up in the night: 2   Do you feel rested in the morning: No   Do you snore: Yes   Do you have sleep apnea: No   Do you use: None     Work: employed out of her home  Marital status:  with adult children  Support: poor    MEDICAL HISTORY  PMH reviewed:   Cardiac disorders: negative  Depression/anxiety: depression  Glaucoma: negative  Kidney stones: negative  Eating disorder: disordered eating, ?BED  Migraines: negative  Seizures: negative  Joint-related conditions: OA, multiple joint pains - chronic  Liver disease: negative  Renal disease: negative  Diabetes: prediabetes  Thyroid disease: negative  Constipation: negative  Other pertinent hx: n/a  Sleep Apnea hx: negative  Cancer hx: negative  Cholecystectomy and/or gallstones: negative  Family or personal history of Pancreatic issues / Medullary Thyroid  Cancer/MENS 2: negative  History of bariatric surgery: negative    FMH reviewed: obesity in parent/s or sibling: yes    REVIEW OF SYSTEMS  GENERAL: feels well otherwise  SKIN: denies any rashes to skin folds  HEENT: snoring- yes  LUNGS: denies shortness of breath with exertion, no apnea  CARDIOVASCULAR: denies chest pain on exertion, denies palpitations or pedal edema  GI: denies abdominal pain.  No N/V/D/C  MUSCULOSKELETAL: chronic bilateral knee and LBP  NEURO: denies headaches or dizziness  PSYCH: denies change in behavior or mood, denies feeling sad or depressed. BED screen- yes    EXAM  /86   Pulse 82   Resp 20   Ht 5' 1\" (1.549 m)   Wt 289 lb (131.1 kg)   BMI 54.61 kg/m² ,   BC not available  GENERAL: well developed, well nourished, in no apparent distress, morbidly obese  SKIN: warm, pink, dry without rashes to exposed area  EYES: conjunctiva pink, sclera non icteric, PERRLA  HEENT: atraumatic, normocephalic, O/p: Mallampati score- 2  NECK: supple, non tender, no adenopathy, no thyromegaly  LUNGS: CTA in all fields, breathing non labored  CARDIO: RRR without murmur, normal S1 and S2 without clicks or gallops, no pedal edema  GI: +BS, soft, no masses, HSM or tenderness  MUSCULOSKELETAL: grossly intact. Using walker for ambulation.  NEURO: Oriented times three  PSYCH: pleasant, cooperative, normal mood and affect    Lab Results   Component Value Date    WBC 8.0 05/14/2024    RBC 4.92 05/14/2024    HGB 14.3 05/14/2024    HCT 45.0 05/14/2024    MCV 91.5 05/14/2024    MCH 29.1 05/14/2024    MCHC 31.8 05/14/2024    RDW 13.0 05/14/2024    .0 05/14/2024     Lab Results   Component Value Date    GLU 84 05/14/2024    BUN 27 (H) 05/14/2024    CREATSERUM 0.85 05/14/2024    ANIONGAP 6 05/14/2024    CA 9.4 05/14/2024    OSMOCALC 292 05/14/2024    ALKPHO 73 05/14/2024    AST 25 05/14/2024    ALT 25 05/14/2024    BILT 0.6 05/14/2024    TP 7.9 05/14/2024    ALB 3.5 05/14/2024    GLOBULIN 4.4 05/14/2024    NA  139 05/14/2024    K 4.6 05/14/2024     05/14/2024    CO2 26.0 05/14/2024     Lab Results   Component Value Date     05/14/2024    A1C 5.8 (H) 05/14/2024     Lab Results   Component Value Date    CHOLEST 183 05/14/2024    TRIG 102 05/14/2024    HDL 64 (H) 05/14/2024     (H) 05/14/2024    VLDL 17 05/14/2024    NONHDLC 119 05/14/2024     Lab Results   Component Value Date    TSH 2.270 05/14/2024     No results found for: \"B12\", \"VITB12\"  No results found for: \"VITD\", \"QVITD\", \"TNNF08TR\"    Medications Ordered Prior to Encounter[1]    ASSESSMENT  Initial Weight Data and Goal Weight Loss:  Weight Calculations  Initial Weight: 289 lbs  Initial Weight Date: 10/28/24  Today's Weight: 289 lbs  5% Goal: 14.45  10% Goal: 28.9  Total Weight Loss: 0 lbs    Diagnoses and all orders for this visit:    Encounter for therapeutic drug monitoring  -     buPROPion  MG Oral Tablet 24 Hr; Take 1 tablet (150 mg total) by mouth every morning.  -     naltrexone 50 MG Oral Tab; Take 0.5 tablets (25 mg total) by mouth daily. For cravings    Class 3 severe obesity with serious comorbidity and body mass index (BMI) of 50.0 to 59.9 in adult, unspecified obesity type (HCC)  - Start generic alternative to Contrave with Nal/Bup.  - Reviewed balanced plate nutrition with focus on whole food, regular meals daily that include protein and produce and eliminating/reducing late night eating.  - Counseled on the 4 Pillars of health (sleep, stress, nutrition and fitness).  - Reviewed weight synopsis in EMR  -     OP REFERRAL TO DIETITIAN [facility] ([facility] USE ONLY)  -     buPROPion  MG Oral Tablet 24 Hr; Take 1 tablet (150 mg total) by mouth every morning.  -     naltrexone 50 MG Oral Tab; Take 0.5 tablets (25 mg total) by mouth daily. For cravings    Osteoarthritis of multiple joints, unspecified osteoarthritis type  -     OP REFERRAL TO DIETITIAN [facility] ([facility] USE ONLY)    Prediabetes  -     OP REFERRAL TO DIETITIAN [facility] ([facility]  USE ONLY)    Eating disorder, unspecified type  -     OP REFERRAL TO DIETITIAN [facility] ([facility] USE ONLY)    Depression, unspecified depression type  -     OP REFERRAL TO DIETITIAN [facility] ([facility] USE ONLY)  -     buPROPion  MG Oral Tablet 24 Hr; Take 1 tablet (150 mg total) by mouth every morning.        PLAN  Medication use and side effects reviewed with patient.  Medication contraindications: sympathomimetics d/t age, Metformin d/t hx of SEs  Follow up with dietitian and psychologist as recommended.  Discussed the role of sleep and stress in weight management.  Reviewed labs in EMR  Counseled on comprehensive weight loss plan including attention to nutrition, exercise and behavior/stress management for success. See patient instruction below for more details.  Reviewed previous labs in EMR/Care Everywhere  Weight Loss Contract reviewed and signed.    Patient Instructions   Welcome to the [facility]...your Lifestyle Renovation begins now!  Thank you for placing your trust in our health care team, I look forward to working with you along this journey to better health!    Next steps:     1.  Call our office at [phone] to schedule a personal nutrition consultation with one of our registered dieticians, Heladio or Cathleen. Bring along your food journal (3 days minimum). See journal options below.  2.  Complete additional testing as ordered: Monitor blood pressure over the next 2 weeks with starting medication and send report via Buru Buru so I can continue your therapy if safe to do so.  3.  Fill your prescribed medication and take as discussed and prescribed: Start generic alternative to Contrave (www.contrave.com) with Wellbutrin XL and Naltrexone. Option to increase dose after 1 month if <5# weight loss, send Zipalonghart status with current weight at the time if needed. Do not take in combination with Tramadol.     Your blood pressure was elevated today at 150/86. Please monitor at home and  follow up with your primary care provider.    What is high blood pressure?  High blood pressure, (also referred to as hypertension), is when your blood pressure, the force of blood flowing through your blood vessels, is consistently too high. Learn more about high blood pressure at https://www.heart.org/en/health-topics/high-blood-pressure.    If you have high blood pressure, you are not alone.  Nearly half of American adults have high blood pressure. (Many don’t even know they have it.)  The best way to know if you have high blood pressure it is to have your blood pressure checked.    Know your numbers.  Learn about your blood pressure numbers and what they mean.    BLOOD PRESSURE (BP) CATEGORY SYSTOLIC mm Hg (upper number) And/or DIASTOLIC mm Hg (lower number)   Normal Less than 120 and Less than 80   Elevated 120-129 and Less than 80   High BP: HTN Stage 1 130-139 or 80-89   High BP: HTN Stage 2 140 or higher or 90 or higher   Hypertensive Crisis Higher than 180 And/or Higher than 120     High blood pressure is a 'silent killer.'  Most of the time there are no obvious symptoms.  Certain physical traits and lifestyle choices can put you at a greater risk for high blood pressure.  When left untreated, the damage that high blood pressure does to your circulatory system is a significant contributing factor to heart attack, stroke and other health threats.    Please try to work on the following dietary changes this first month:    1.  Drink water with meals and throughout the day, cut down on soda and/or juice if consumed. Consider flavored water options like Bubbly, Spindrift, Hint and Jose.  2.  Have protein with each meal, examples include: greek yogurt, cottage cheese, hard boiled egg, tofu, chicken, fish, or tuna.  3.  Work towards reducing/eliminating refined carbohydrates and sugars which includes items such as sweets, as well as rice, pasta, and bread and make sure to choose whole grain options when having  them with just 1 serving per meal about the size of your inner palm.  4.  Consume non starchy veggies daily working towards making them a good 50% of your daily food intake. Add them to lunch and dinner consistently.  5.  Start a daily probiotic: VSL#3 is recommended, (order on line at www.vsl3.com). Take 1 capsule daily with water for 30 days, then reduce to 1 every other day (this will reduce the cost). Capsules can be left out of refrigerator for 2 weeks. I recommend using a pill box weekly and keeping the bottle in the fridge.    Please download kendall My Fitness Pal, LoseIt! Or Net Diary to monitor daily dietary intake and you will be able to see if you are eating the right amount of calories or too much or too little which would hinder weight loss. Additionally this will help to see your daily carbohydrate and protein intake. When you set the kendall up choose 1-1.5 lbs/week as a goal.  Keeping a paper food journal is an option as well to remain accountable for your choices- this is the start to mindful eating! A low calorie diet has been consistently shown to support weight loss.    Continue or start exercising to help establish a routine. If not already exercising begin with 1 day/week and progress as able with the goal of working towards 30 minutes 5 days a week at a minimum. A variety or cardio, strength and stretching is important. Review resources below to help support you in building this healthy routine.    Meditation daily can help manage and control stress. Chronic stress can make weight loss difficult.  Exercising is one way to help with stress, but meditation using the CALM Kendall or another comparable alternative can be done in your home or place of work with little time commitment. This Kendall can also help work on behavior change and improve sleep. Check out the segment under Calm Masterclass and listen to The 4 Pillars of Health. A great way to begin learning about the foundation of lifestyle with practical  tips to use in your every day. In addition, we offer counseling services and support for individual connection and care. A referral is necessary so please let me know if this is a service you are interested.    Check out www.yourweightmatters.org blog for continued support and education along your weight loss journey to optimal health!      Patient Resources:    Personal Training/Fitness Classes/Health Coaching    [facility] in [place]: Full fitness center with group fitness and personal training located in [place].  Health Coaching with Agustina Adames, Nicolas Smith, and Stan Chicas at our [facility]- individual coaching to work on your health goals. Call [phone] and/or email @ [email or web address]. Free 60 minute consult when client of [employer] [facility].  [facility] Vicente @ [email or web address] Rentals. A variety of group fitness options plus various yoga classes [phone] and/or email Cathy at [email or web address]  [place] Healthed Fitness Centers with multiple locations: PolicyGenius (www.LearnStreet), F45 Training (www.b56aesqhvmy.COCC), Fit Body Bootcamp (www.APProtectbodyboagnion Energyp.COCC), Boutir (www.Satin Technologies), The Exercise  (www.exercisecoach.com), Club Pilates (www.clubpilates.COCC)    Online Fitness  Fitness  on Keniu  Fit in 10 DVD series   www.dijnx08WRP.COCC  Chair exercises via Sit and Be Fit (www.sitandbefit.org) and MeetMe (www.BeSmart.com) or Lawrence Schulte or Stuart Herrera videos on YouTube.  Hip Hop Fit with Andrew Lowry at www.hiphopfit.net    Apps for on the Go Fitness  [place]way 7 Minute Workout (orange box with white 7) - free on the go HIIT training kendall  Pelelizan Kendall @ www.onepeloton.com    Nutrition Trackers and Programs  LoseIT! And My Fitness Pal apps and on line for tracking nutrition  NOOM - virtual health coaching  FitFoundation (healthy meals on the go) in [place] @  www.fgjkkrdpdlubg7m.com  Alexis ZUNIGA @ www.bistromd.ThinkLink and Bkhtlu48 (calorie smart and low carb plans recommended) @ www.feapyt56.com, Metabolic Meals @ www.MyMetabolicMeals.com - individual prepared meals to go  Gobble, Blue Apron, Home , Every Plate, Sunbasket- on line meal delivery programs for preparation at home  [place] in [place] for homemade meals to go @ www.Cardiio  Diet Doctor @ www.dietdoctor.com - low carb swaps  [employer] - meal prep and planning johnny (www.yummly.com)    Stress, Anxiety, Depression, Trauma  CALM meditation johnny (www.calm.com)  Headspace  Don't let anxiety run your life. Using the science of emotion regulation and mindfulness to overcome fear and worry by Blaze Espinoza PsyD and Ramesh Echavarria MA.  The [employer] Podcast (September 27, 2023): 6 Magic Words That Stop Anxiety  What Happened to You?- a look at the impact trauma has on behavior written by Ana M Manzanares and Dr. Teodoro Alvarez  Whole Again by Crescencio Wilson - discovering your true self after trauma    Mindful Eating/The Hungry Brain  Am I Hungry? Mindful eating virtual  johnny (www.amihungry.com)  The Hungry Brain by Katt Diaz, PhD  Mindless Eating by Andrew Hinkle  Weight Loss Surgery Will Not Treat Food Addiction by Ebonie Rose Ph.D    Metabolic Dysfunction, Hormones and Cravings  Why We Get Sick? By Al Brown (insulin resistance)  Your Body in Balance: The New Science of Food, Hormones, and Health by Dr. Wilber Pepper  The Complete Guide to fasting by Dr. Espinal  Fast Like a Girl by Dr. Margi Serrato  The Menopause Reset by Dr. Margi Serrato  Sugar, Salt & Fat by Mel Ahuja, Ph.D, R.D.  The Truth About Sugar - documentary on sugar (Free on [employer], https://youtu.be/5P7lfwuWD1y)  Reverse Visceral Fat: #1 Way to Increase Your Lifespan & End Inflammation with Dr. Vincent Landaverde on Utube @ https://youSmartAssetu.be/nupPRnvUpJY?si=yz9akbHzUPN0OzcL    Nutrition Support  You Are What You Eat - Netfix series on twin  study looking at impact of nutrition changes on health  The End of Dieting: How to Live for Life by Dr. Stan Jiménez M.D. or listen to The Qunar.com Podcast Episode 63: Understanding \"Nutritarian\" Eating w/Dr. Stan Jiménez  The Game Changers- Netflix Documentary on plant based nutrition  The Dr. Key T5 Wellness Plan by Dr. Remy Key MD  The Complete Guide to fasting by Dr. Espinal  @[facility] ([facility] Dietician with support surrounding nutrition and meal prep/planning)    Education, Motivation and Support Resources  Live to 100: Secrets of the Blue Zones - Netflix series on the secrets to communities living over 100 years old  Atomic Habits by Denilson Roland (a book about taking small steps to promote greater behavior change)   Motivation johnny (black box with white \")- daily supportive messages sent to your phone  Can't Hurt Me by Blaze Castaneda (a book exploring the power of discipline in achieving your goals)  Fed Up - documentary about obesity (Free on Utube)  Www.yourweightmatters.org - Obesity Action Coalition sponsored Blog posts  Obesity Action Coalition Resources on topics specific to weight management (www.obesityaction.org)  Fitlosophy Fitspiration - journal to better health (journal book found at [employer] in fitness aisle)  Daphne Garcia talk titled: The Call to Courage (Netflix)  The Exam Room by the Physician's Committee (Podcast)  Nutrition Facts by Dr. Butterfield (Podcast)      Balanced Nutrition includes:     Build the mentality of Food 4 Fuel. Clean eating with whole foods and eliminating/reducing ultra processed foods.  Be an intuitive eater and using mindful eating practices.  Eat a balanced plate with protein and produce at all meals: 1/4 plate- protein, 1/2 plate non starchy veggies, and 1/4 plate fruit or complex carbohydrate.  Drink water with all meals and use a salad plate to naturally reduce portions.  Eliminate/reduce late night eating by stopping after 7pm. Allowing your body to fast  for 12 hours (drink only water, tea or black coffee without any additives).            Mindful eating takes practice to build a life long habit. Often we want to eat but not for true hunger, rather it's triggered by emotional/physical or environmental reasons. Check out www.NanoAntibiotics website for tools and tips as well as an johnny for daily support. Beyond these tools counseling can be an option to help support behavior change.    Get In Touch With Your Appetite- Hunger Cues  There are many signals that tell us it's time to eat (other than a rumbling stomach): television ads, social events, smells from the food court, and the candy bowl at the office. These factors in the environment trigger our senses and other mental processes that make us think we are hungry even when we’re not.  **The Hunger Rating Scale can help you decide if you are experiencing real hunger.  Remember that physical hunger builds gradually over time (usually over several hours after a meal), whereas emotional eating and cravings usually come on very suddenly. When you are genuinely hungry, you may experience one or several of the symptoms listed below:  Stomach pangs or growling   Emptiness in the stomach   Irritability   Headache   Low energy/fatigue   Difficulty concentrating  How Does the Scale Work?  Before you eat, take a moment to rate your hunger. Think about how hungry you physically feel. Your goal is to eat between levels 4 and 6. This means you are eating when you are hungry but stopping when you are comfortably full.  Try not to put off eating for too long. Waiting until level 1 or 2 -- when you are starving and unable to concentrate -- may lead to overeating. When you first start to feel any of the symptoms listed above, you should probably start to think about eating.  We often let the sight of food tempt us when we are above a level 6 on the scale. Before you indulge, take a step back and think about how you feel. Did you just  eat a few minutes ago? Are you eating in response to an emotion or because you are experiencing physical hunger?  Think of alternatives to eating for when these temptations arise. Some ideas are:  Drink a glass of cold water or another zero-calorie beverage   Take a walk to change the scenery   Do another form of exercise (sit-ups, running, swimming, tennis, etc.)   Call/text a friend or family member   Play a game with someone else  **   Hunger-Satiety Rating Scale    Full - 10        10 = Stuffed to the point of feeling sick   9 = Very uncomfortably full, need to loosen your belt    8 = Uncomfortably full, feel stuffed    7 = Very full, feel as if you have overeaten    6 = Comfortably full, satisfied    Neutral - 5    5 = Comfortable, neither hungry nor full   4 = Beginning signs and symptoms of hunger    3 = Hungry with several hunger symptoms, ready to eat    2 = Very hungry, unable to concentrate    Hungry - 1    1 = Starving, dizzy, irritable     ___________________________________________________________________  Taken from the American Diabetes Association @ www.diabetes.org.  Last Edited: March 19, 2014, reviewed December 17, 2013    Emotional Eating and Overeating   You have to know how to stop emotional eating and stop overeating if you want to lose weight and keep it off successfully.  Emotional eating and overeating can’t really satisfy an insatiable appetite anyway.  And whether or not you’ve been trying to use emotional eating to soothe feelings of stress, depression, loneliness, frustration or boredom, in the long run, overeating to feed those feelings only makes them worse.  But learning how to stop overeating and control emotional eating can support healthy permanent weight loss and make you feel powerful.  Stop Emotional Eating - Don’t Use Foods to Soothe Moods  You probably already know that overeating high-fat, high-calorie, sweet, salty and unhealthy bad carbs won’t fill that empty void inside  for long.  But what can you do to learn how to stop emotional overeating?  Most of us learn emotional eating at a very young age. We get into the habit of using food to sooth stressful feelings, alleviate boredom, reward and comfort ourselves, boost our sprits and celebrate with others.  But even though almost everyone’s overeating, you don’t have to. If you’re ready to take that old-frenzied feed-your-feelings bull by the horns, here’s our basic 12 step program for how to stop emotional eating.  1. Make a commitment. Like any established bad habit, nothing will change unless you make a commit to changing your behavior.  2. Practice awareness. To be more conscious of what’s happening, jot down when and what you eat and how you feel before and afterwards.  3. Manage your stress. Healthy emotional distress management is an important life skill. Positive ways to reduce stress include regular exercise, relaxation techniques and getting support from family and friends.  4. Be physically active. Exercise reduces stress and is a great mood enhancer too. So be sure you make time for regular physical activity.  5. Create new comforts. Make a list of healthy activities you enjoy. And, whenever you feel the need, treat yourself to something on your list.  6. Start eating healthier. When you eat for health you’ll choose more high fiber foods, such as vegetables, beans, whole grains and fresh fruits, plus healthy high protein foods, like fish, lean poultry and low-fat dairy.  7. Eat mini-meals often. By eating 5 or 6 small healthy meals a day, including breakfast, you help keep your blood sugar and moods stable.  8. Get rid of temptations. Don’t keep unhealthy food in the house, don’t shop for food when hungry or stressed and plan ahead before eating out.  9. Get enough sleep. When you’re tired, it’s easier to give in to emotional eating. Consider taking a nap and be sure to get a good nights sleep.  10. Use healthy distraction.  Instead of overeating, take a walk, surf the Internet, pet your cat or dog, listen to music, enjoy a warm bath, read a good book, watch a movie, work in the garden or talk to a friend.  11. Practice mindfulness. Mindful eating means paying attention to the act of eating and observing your thoughts and feelings in the process.  12. Get some support. It’s easier to control emotional eating if you have a support network of friends or family. And if no one you know is supportive, make some new health-oriented friends or join a support group.  Learning how to stop emotional eating and overeating is a life-changing experience. Just make sure to stay on track and enjoy the journey.    Posted By Salinas Chirinos On December 27, 2015 @ 11:33 am In Healthy Living. Taken from www.TheraVid      Return in about 3 months (around 1/28/2025) for weight management via clinic.    Patient verbalizes understanding.    CARLO Brady  10/27/2024    DOCUMENTATION OF TIME SPENT: Code selection for this visit was based on time spent : 60 minutes on date of service in preparing to see the patient, obtaining and/or reviewing separately obtained history, performing a medically appropriate examination, counseling and educating the patient/family/caregiver, ordering medications or testing, referring and communicating with other healthcare providers, documenting clinical information in the electronic medical record, independently interpreting results and communicating results to the patient/family/caregiver and care coordination with the patient's other providers.         [1]   Current Outpatient Medications on File Prior to Visit   Medication Sig Dispense Refill    diclofenac 75 MG Oral Tab EC Take 1 tablet (75 mg total) by mouth 2 (two) times daily. 28 tablet 1    traMADol 50 MG Oral Tab Please take 1 to 2 tablets every 4 hours as needed for pain. 10 tablet 1    escitalopram (LEXAPRO) 20 MG Oral Tab Take 1 tablet (20 mg total)  by mouth daily. 90 tablet 1    gabapentin 600 MG Oral Tab Take 1 tablet (600 mg total) by mouth 3 (three) times daily. 270 tablet 0    Calcium Carbonate-Vitamin D 600-400 MG-UNIT Oral Tab Take 1 tablet by mouth daily.      Multiple Vitamins-Minerals (CENTRUM SILVER) Oral Tab Take 1 tablet by mouth daily.       No current facility-administered medications on file prior to visit.

## 2024-10-28 NOTE — TELEPHONE ENCOUNTER
Per last office note, patient should be taking Metformin 750mg twice daily. Medication refused.

## 2024-10-28 NOTE — PATIENT INSTRUCTIONS
Welcome to the [facility]...your Lifestyle Renovation begins now!  Thank you for placing your trust in our health care team, I look forward to working with you along this journey to better health!    Next steps:     1.  Call our office at [phone] to schedule a personal nutrition consultation with one of our registered dieticians, Heladio Connolly. Bring along your food journal (3 days minimum). See journal options below.  2.  Complete additional testing as ordered: Monitor blood pressure over the next 2 weeks with starting medication and send report via LiveGO so I can continue your therapy if safe to do so.  3.  Fill your prescribed medication and take as discussed and prescribed: Start generic alternative to Contrave (www.contrave.com) with Wellbutrin XL and Naltrexone. Option to increase dose after 1 month if <5# weight loss, send Ryzinghart status with current weight at the time if needed. Do not take in combination with Tramadol.     Your blood pressure was elevated today at 150/86. Please monitor at home and follow up with your primary care provider.    What is high blood pressure?  High blood pressure, (also referred to as hypertension), is when your blood pressure, the force of blood flowing through your blood vessels, is consistently too high. Learn more about high blood pressure at https://www.heart.org/en/health-topics/high-blood-pressure.    If you have high blood pressure, you are not alone.  Nearly half of American adults have high blood pressure. (Many don’t even know they have it.)  The best way to know if you have high blood pressure it is to have your blood pressure checked.    Know your numbers.  Learn about your blood pressure numbers and what they mean.    BLOOD PRESSURE (BP) CATEGORY SYSTOLIC mm Hg (upper number) And/or DIASTOLIC mm Hg (lower number)   Normal Less than 120 and Less than 80   Elevated 120-129 and Less than 80   High BP: HTN Stage 1 130-139 or 80-89    High BP: HTN Stage 2 140 or higher or 90 or higher   Hypertensive Crisis Higher than 180 And/or Higher than 120     High blood pressure is a 'silent killer.'  Most of the time there are no obvious symptoms.  Certain physical traits and lifestyle choices can put you at a greater risk for high blood pressure.  When left untreated, the damage that high blood pressure does to your circulatory system is a significant contributing factor to heart attack, stroke and other health threats.    Please try to work on the following dietary changes this first month:    1.  Drink water with meals and throughout the day, cut down on soda and/or juice if consumed. Consider flavored water options like Bubbly, Spindrift, Hint and Jose.  2.  Have protein with each meal, examples include: greek yogurt, cottage cheese, hard boiled egg, tofu, chicken, fish, or tuna.  3.  Work towards reducing/eliminating refined carbohydrates and sugars which includes items such as sweets, as well as rice, pasta, and bread and make sure to choose whole grain options when having them with just 1 serving per meal about the size of your inner palm.  4.  Consume non starchy veggies daily working towards making them a good 50% of your daily food intake. Add them to lunch and dinner consistently.  5.  Start a daily probiotic: VSL#3 is recommended, (order on line at www.vsl3.com). Take 1 capsule daily with water for 30 days, then reduce to 1 every other day (this will reduce the cost). Capsules can be left out of refrigerator for 2 weeks. I recommend using a pill box weekly and keeping the bottle in the fridge.    Please download johnny My Fitness Pal, LoseIt! Or Net Diary to monitor daily dietary intake and you will be able to see if you are eating the right amount of calories or too much or too little which would hinder weight loss. Additionally this will help to see your daily carbohydrate and protein intake. When you set the johnny up choose 1-1.5 lbs/week as  a goal.  Keeping a paper food journal is an option as well to remain accountable for your choices- this is the start to mindful eating! A low calorie diet has been consistently shown to support weight loss.    Continue or start exercising to help establish a routine. If not already exercising begin with 1 day/week and progress as able with the goal of working towards 30 minutes 5 days a week at a minimum. A variety or cardio, strength and stretching is important. Review resources below to help support you in building this healthy routine.    Meditation daily can help manage and control stress. Chronic stress can make weight loss difficult.  Exercising is one way to help with stress, but meditation using the CALM Kendall or another comparable alternative can be done in your home or place of work with little time commitment. This Kendall can also help work on behavior change and improve sleep. Check out the segment under Calm Masterclass and listen to The 4 Pillars of Health. A great way to begin learning about the foundation of lifestyle with practical tips to use in your every day. In addition, we offer counseling services and support for individual connection and care. A referral is necessary so please let me know if this is a service you are interested.    Check out www.yourweightmatters.org blog for continued support and education along your weight loss journey to optimal health!      Patient Resources:    Personal Training/Fitness Classes/Health Coaching    [facility] in [place]: Full fitness center with group fitness and personal training located in [place].  Health Coaching with Agustina Adames, Nicolas Smith, and Stan Chicas at our [facility]- individual coaching to work on your health goals. Call [phone] and/or email @ [email or web address]. Free 60 minute consult when client of [facility].  IKER Zhang @ [email or web address]://[email or web address] Health [facility] [email or web address].[email or web address]. A variety of  group fitness options plus various yoga classes [phone] and/or email Cathy at [email or web address]  [facility] Fitness Centers with multiple locations: Contentment Ltd (www.VI Systems), F45 Training (www.t61pngzjtqb.Moonfrye), Fit Body Bootcamp (www.ImaginAbbodybootcamp.Moonfrye), SpeedTax (www.AdHack.Moonfrye), The Exercise  (www.exercisecoach.com), Club PilThe Neat Company (www.clubKadoink.Moonfrye)    Online Fitness  Fitness  on KnowledgeMill  Fit in 10 DVD series   www.goaxb23MQTAffinimark Technologies  Chair exercises via Sit and Be Fit (www.sitandEmpower RF Systems.Moaxis Technologies Inc.) and HiConversion (www.TappIn.com) or Lawrence Schulte or Stuart Herrera videos on YouTube.  Hip Hop Fit with Andrew uYks at www.hiphopfit.Actiance    Apps for on the Go Fitness  Routeware 7 Minute Workout (orange box with white 7) - free on the go HIIT training kendall  Peloton Kendall @ www.onepeloton.com    Nutrition Trackers and Programs  LoseIT! And My Fitness Pal apps and on line for tracking nutrition  NOOM - virtual health coaching  FitFoundation (healthy meals on the go) in [place] @ www.xenjdyacjldyu1jAffinimark Technologies  Alexis ZUNIGA @ PA SemibistrSoundSenasation and Uudrup91 (calorie smart and low carb plans recommended) @ www.eoywvq84.com, Metabolic Meals @ www.DaqiMetabolicMeals.com - individual prepared meals to go  Gobble, Blue Apron, Home , Every Plate, Sunbasket- on line meal delivery programs for preparation at home  Meal Village in [place] for homemade meals to go @ www.mealvillage.Moonfrye  Diet Doctor @ www.dietdoctor.com - low carb swaps  Yummly - meal prep and planning kendall (www.yummly.com)    Stress, Anxiety, Depression, Trauma  CALM meditation kendall (www.calm.com)  Headspace  Don't let anxiety run your life. Using the science of emotion regulation and mindfulness to overcome fear and worry by Blaze Espinoza PsyD and Ramesh Echavarria MA.  The Sprig Toys Podcast (September 27, 2023): 6 Magic Words That Stop Anxiety  What Happened to You?- a look at the impact trauma has on  behavior written by Ana M Manzanares and Dr. Teodoro Alvarez  Whole Again by Crescencio Wilson - discovering your true self after trauma    Mindful Eating/The Hungry Brain  Am I Hungry? Mindful eating virtual  johnny (www.amihungry.com)  The Hungry Brain by Katt Diaz, PhD  Mindless Eating by Andrew Hinkle  Weight Loss Surgery Will Not Treat Food Addiction by Ebonie Rose Ph.D    Metabolic Dysfunction, Hormones and Cravings  Why We Get Sick? By Al Brown (insulin resistance)  Your Body in Balance: The New Science of Food, Hormones, and Health by Dr. Wilber Pepper  The Complete Guide to fasting by Dr. Espinal  Fast Like a Girl by Dr. Margi Serrato  The Menopause Reset by Dr. Margi Serrato  Sugar, Salt & Fat by Mel Ahuja, Ph.D, R.D.  The Truth About Sugar - documentary on sugar (Free on Icinetic, https://Global Ad Sourceu.be/2O2xulcGB6h)  Reverse Visceral Fat: #1 Way to Increase Your Lifespan & End Inflammation with Dr. Vincent Landaverde on Utube @ https://Klik Technologies.be/nupPRnvUpJY?si=[email or web address]    Nutrition Support  You Are What You Eat - Netfix series on twin study looking at impact of nutrition changes on health  The End of Dieting: How to Live for Life by Dr. Stan Jiménez M.D. or listen to The Secret Escapes Podcast Episode 63: Understanding \"Nutritarian\" Eating w/Dr. Stan Jiménez  The Game Changers- Netflix Documentary on plant based nutrition  The Dr. Key T5 Wellness Plan by Dr. Remy Key MD  The Complete Guide to fasting by Dr. Espinal  @[facility] Hospitalmeix (Instagram Dietician with support surrounding nutrition and meal prep/planning)    Education, Motivation and Support Resources  Live to 100: Secrets of the Blue Zones - Netflix series on the secrets to communities living over 100 years old  Atomic Habits by Denilson Roland (a book about taking small steps to promote greater behavior change)   Motivation johnny (black box with white \")- daily supportive messages sent to your phone  Can't Hurt Me by Blaze Castaneda (a book exploring the  power of discipline in achieving your goals)  Fed Up - documentary about obesity (Free on Utube)  Www.yourweightmatters.org - Obesity Action Coalition sponsored Blog posts  Obesity Action Coalition Resources on topics specific to weight management (www.obesityaction.org)  Fitlosophy Fitspiration - journal to better health (journal book found at [employer] in fitness aisle)  Daphne Garcia talk titled: The Call to Courage (Netflix)  The Exam Room by the Physician's Committee (Podcast)  Nutrition Facts by Dr. Butterfield (Podcast)      Balanced Nutrition includes:     Build the mentality of Food 4 Fuel. Clean eating with whole foods and eliminating/reducing ultra processed foods.  Be an intuitive eater and using mindful eating practices.  Eat a balanced plate with protein and produce at all meals: 1/4 plate- protein, 1/2 plate non starchy veggies, and 1/4 plate fruit or complex carbohydrate.  Drink water with all meals and use a salad plate to naturally reduce portions.  Eliminate/reduce late night eating by stopping after 7pm. Allowing your body to fast for 12 hours (drink only water, tea or black coffee without any additives).            Mindful eating takes practice to build a life long habit. Often we want to eat but not for true hunger, rather it's triggered by emotional/physical or environmental reasons. Check out www.viVood website for tools and tips as well as an johnny for daily support. Beyond these tools counseling can be an option to help support behavior change.    Get In Touch With Your Appetite- Hunger Cues  There are many signals that tell us it's time to eat (other than a rumbling stomach): television ads, social events, smells from the food court, and the candy bowl at the office. These factors in the environment trigger our senses and other mental processes that make us think we are hungry even when we’re not.  **The Hunger Rating Scale can help you decide if you are experiencing real hunger.  Remember that  physical hunger builds gradually over time (usually over several hours after a meal), whereas emotional eating and cravings usually come on very suddenly. When you are genuinely hungry, you may experience one or several of the symptoms listed below:  Stomach pangs or growling   Emptiness in the stomach   Irritability   Headache   Low energy/fatigue   Difficulty concentrating  How Does the Scale Work?  Before you eat, take a moment to rate your hunger. Think about how hungry you physically feel. Your goal is to eat between levels 4 and 6. This means you are eating when you are hungry but stopping when you are comfortably full.  Try not to put off eating for too long. Waiting until level 1 or 2 -- when you are starving and unable to concentrate -- may lead to overeating. When you first start to feel any of the symptoms listed above, you should probably start to think about eating.  We often let the sight of food tempt us when we are above a level 6 on the scale. Before you indulge, take a step back and think about how you feel. Did you just eat a few minutes ago? Are you eating in response to an emotion or because you are experiencing physical hunger?  Think of alternatives to eating for when these temptations arise. Some ideas are:  Drink a glass of cold water or another zero-calorie beverage   Take a walk to change the scenery   Do another form of exercise (sit-ups, running, swimming, tennis, etc.)   Call/text a friend or family member   Play a game with someone else  **   Hunger-Satiety Rating Scale    Full - 10        10 = Stuffed to the point of feeling sick   9 = Very uncomfortably full, need to loosen your belt    8 = Uncomfortably full, feel stuffed    7 = Very full, feel as if you have overeaten    6 = Comfortably full, satisfied    Neutral - 5    5 = Comfortable, neither hungry nor full   4 = Beginning signs and symptoms of hunger    3 = Hungry with several hunger symptoms, ready to eat    2 = Very hungry,  unable to concentrate    Hungry - 1    1 = Starving, dizzy, irritable     ___________________________________________________________________  Taken from the American Diabetes Association @ www.diabetes.org.  Last Edited: March 19, 2014, reviewed December 17, 2013    Emotional Eating and Overeating   You have to know how to stop emotional eating and stop overeating if you want to lose weight and keep it off successfully.  Emotional eating and overeating can’t really satisfy an insatiable appetite anyway.  And whether or not you’ve been trying to use emotional eating to soothe feelings of stress, depression, loneliness, frustration or boredom, in the long run, overeating to feed those feelings only makes them worse.  But learning how to stop overeating and control emotional eating can support healthy permanent weight loss and make you feel powerful.  Stop Emotional Eating - Don’t Use Foods to Soothe Moods  You probably already know that overeating high-fat, high-calorie, sweet, salty and unhealthy bad carbs won’t fill that empty void inside for long.  But what can you do to learn how to stop emotional overeating?  Most of us learn emotional eating at a very young age. We get into the habit of using food to sooth stressful feelings, alleviate boredom, reward and comfort ourselves, boost our sprits and celebrate with others.  But even though almost everyone’s overeating, you don’t have to. If you’re ready to take that old-frenzied feed-your-feelings bull by the teresa, here’s our basic 12 step program for how to stop emotional eating.  1. Make a commitment. Like any established bad habit, nothing will change unless you make a commit to changing your behavior.  2. Practice awareness. To be more conscious of what’s happening, jot down when and what you eat and how you feel before and afterwards.  3. Manage your stress. Healthy emotional distress management is an important life skill. Positive ways to reduce stress include  regular exercise, relaxation techniques and getting support from family and friends.  4. Be physically active. Exercise reduces stress and is a great mood enhancer too. So be sure you make time for regular physical activity.  5. Create new comforts. Make a list of healthy activities you enjoy. And, whenever you feel the need, treat yourself to something on your list.  6. Start eating healthier. When you eat for health you’ll choose more high fiber foods, such as vegetables, beans, whole grains and fresh fruits, plus healthy high protein foods, like fish, lean poultry and low-fat dairy.  7. Eat mini-meals often. By eating 5 or 6 small healthy meals a day, including breakfast, you help keep your blood sugar and moods stable.  8. Get rid of temptations. Don’t keep unhealthy food in the house, don’t shop for food when hungry or stressed and plan ahead before eating out.  9. Get enough sleep. When you’re tired, it’s easier to give in to emotional eating. Consider taking a nap and be sure to get a good nights sleep.  10. Use healthy distraction. Instead of overeating, take a walk, surf the Internet, pet your cat or dog, listen to music, enjoy a warm bath, read a good book, watch a movie, work in the garden or talk to a friend.  11. Practice mindfulness. Mindful eating means paying attention to the act of eating and observing your thoughts and feelings in the process.  12. Get some support. It’s easier to control emotional eating if you have a support network of friends or family. And if no one you know is supportive, make some new health-oriented friends or join a support group.  Learning how to stop emotional eating and overeating is a life-changing experience. Just make sure to stay on track and enjoy the journey.    Posted By Salinas Chirinos On December 27, 2015 @ 11:33 am In Healthy Living. Taken from www.LegalFÃ¡cil

## 2024-11-01 ENCOUNTER — PATIENT MESSAGE (OUTPATIENT)
Dept: INTERNAL MEDICINE CLINIC | Facility: CLINIC | Age: 69
End: 2024-11-01

## 2024-11-01 DIAGNOSIS — M16.0 PRIMARY OSTEOARTHRITIS OF BOTH HIPS: ICD-10-CM

## 2024-11-01 DIAGNOSIS — E66.813 CLASS 3 SEVERE OBESITY WITH SERIOUS COMORBIDITY AND BODY MASS INDEX (BMI) OF 50.0 TO 59.9 IN ADULT, UNSPECIFIED OBESITY TYPE (HCC): ICD-10-CM

## 2024-11-01 DIAGNOSIS — R73.03 PREDIABETES: ICD-10-CM

## 2024-11-01 DIAGNOSIS — M54.16 LUMBAR RADICULOPATHY: ICD-10-CM

## 2024-11-01 DIAGNOSIS — M43.06 LUMBAR SPONDYLOLYSIS: ICD-10-CM

## 2024-11-01 DIAGNOSIS — E66.01 CLASS 3 SEVERE OBESITY WITH SERIOUS COMORBIDITY AND BODY MASS INDEX (BMI) OF 50.0 TO 59.9 IN ADULT, UNSPECIFIED OBESITY TYPE (HCC): ICD-10-CM

## 2024-11-01 DIAGNOSIS — M17.12 PRIMARY OSTEOARTHRITIS OF LEFT KNEE: ICD-10-CM

## 2024-11-01 DIAGNOSIS — M17.11 PRIMARY OSTEOARTHRITIS OF RIGHT KNEE: ICD-10-CM

## 2024-11-01 DIAGNOSIS — M15.9 OSTEOARTHRITIS OF MULTIPLE JOINTS, UNSPECIFIED OSTEOARTHRITIS TYPE: ICD-10-CM

## 2024-11-01 DIAGNOSIS — Z51.81 ENCOUNTER FOR THERAPEUTIC DRUG MONITORING: Primary | ICD-10-CM

## 2024-11-01 RX ORDER — TRAMADOL HYDROCHLORIDE 50 MG/1
TABLET ORAL
Qty: 10 TABLET | Refills: 1 | Status: SHIPPED | OUTPATIENT
Start: 2024-11-01

## 2024-11-01 RX ORDER — DICLOFENAC SODIUM 75 MG/1
75 TABLET, DELAYED RELEASE ORAL 2 TIMES DAILY
Qty: 28 TABLET | Refills: 1 | Status: SHIPPED | OUTPATIENT
Start: 2024-11-01

## 2024-11-01 RX ORDER — DICLOFENAC SODIUM 75 MG/1
75 TABLET, DELAYED RELEASE ORAL 2 TIMES DAILY
Qty: 28 TABLET | Refills: 1 | OUTPATIENT
Start: 2024-11-01

## 2024-11-01 NOTE — TELEPHONE ENCOUNTER
Diclofenac 75 mg  DOS: N/A  Last OV: 09/05/24  Last refill date: 10/1/24     #/refills: 28/1  Upcoming appt:   Future Appointments   Date Time Provider Department Center   11/20/2024 10:00 AM Bjorn Ramirez DO [facility] [address]   11/25/2024 10:45 AM Blaze Guido MD [facility] [place]   12/6/2024 10:00 AM Bjorn Ramirez DO [facility] [address]   2/19/2025 12:30 PM Shailesh Valadez APRN [facility] [place]   3/24/2025  2:00 PM Eva Springer APRN [facility] [id number]   6/23/2025  2:00 PM Eva Springer APRN [facility] [id number]

## 2024-11-01 NOTE — TELEPHONE ENCOUNTER
Tramadol 50 mg  DOS: N/A  Last OV: 09/05/24  Last refill date: 08/29/24     #/refills: 10/1  Upcoming appt:   Future Appointments   Date Time Provider Department Center   11/20/2024 10:00 AM Bjorn Ramirez DO [facility] [address]   11/25/2024 10:45 AM Blaze Guido MD [facility] [place]   12/6/2024 10:00 AM Bjorn Ramirez DO [facility] [address]   2/19/2025 12:30 PM Shailesh Valadez APRN [facility] [place]   3/24/2025  2:00 PM Eva Springer APRN [facility] [id number]   6/23/2025  2:00 PM Eva Springer APRN [facility] [id number]

## 2024-11-04 ENCOUNTER — PATIENT MESSAGE (OUTPATIENT)
Dept: PAIN CLINIC | Facility: CLINIC | Age: 69
End: 2024-11-04

## 2024-11-04 DIAGNOSIS — M54.16 LUMBAR RADICULITIS: Primary | ICD-10-CM

## 2024-11-07 ENCOUNTER — HOSPITAL ENCOUNTER (OUTPATIENT)
Facility: HOSPITAL | Age: 69
Setting detail: HOSPITAL OUTPATIENT SURGERY
Discharge: HOME | End: 2024-11-07
Attending: ANESTHESIOLOGY | Admitting: ANESTHESIOLOGY
Payer: MEDICARE

## 2024-11-07 ENCOUNTER — APPOINTMENT (OUTPATIENT)
Dept: GENERAL RADIOLOGY | Facility: HOSPITAL | Age: 69
End: 2024-11-07
Attending: ANESTHESIOLOGY
Payer: MEDICARE

## 2024-11-07 VITALS
DIASTOLIC BLOOD PRESSURE: 74 MMHG | SYSTOLIC BLOOD PRESSURE: 153 MMHG | HEART RATE: 74 BPM | TEMPERATURE: 98 F | BODY MASS INDEX: 54.56 KG/M2 | RESPIRATION RATE: 18 BRPM | HEIGHT: 61 IN | WEIGHT: 289 LBS | OXYGEN SATURATION: 92 %

## 2024-11-07 PROCEDURE — 64483 NJX AA&/STRD TFRM EPI L/S 1: CPT | Performed by: ANESTHESIOLOGY

## 2024-11-07 PROCEDURE — 3E0R3KZ INTRODUCTION OF OTHER DIAGNOSTIC SUBSTANCE INTO SPINAL CANAL, PERCUTANEOUS APPROACH: ICD-10-PCS | Performed by: ANESTHESIOLOGY

## 2024-11-07 PROCEDURE — 3E0R33Z INTRODUCTION OF ANTI-INFLAMMATORY INTO SPINAL CANAL, PERCUTANEOUS APPROACH: ICD-10-PCS | Performed by: ANESTHESIOLOGY

## 2024-11-07 RX ORDER — LIDOCAINE HYDROCHLORIDE 10 MG/ML
INJECTION, SOLUTION EPIDURAL; INFILTRATION; INTRACAUDAL; PERINEURAL
Status: DISCONTINUED | OUTPATIENT
Start: 2024-11-07 | End: 2024-11-07

## 2024-11-07 RX ORDER — SODIUM CHLORIDE 9 MG/ML
INJECTION, SOLUTION INTRAMUSCULAR; INTRAVENOUS; SUBCUTANEOUS
Status: DISCONTINUED | OUTPATIENT
Start: 2024-11-07 | End: 2024-11-07

## 2024-11-07 RX ORDER — DEXAMETHASONE SODIUM PHOSPHATE 10 MG/ML
INJECTION, SOLUTION INTRAMUSCULAR; INTRAVENOUS
Status: DISCONTINUED | OUTPATIENT
Start: 2024-11-07 | End: 2024-11-07

## 2024-11-07 RX ORDER — NALOXONE HYDROCHLORIDE 0.4 MG/ML
0.08 INJECTION, SOLUTION INTRAMUSCULAR; INTRAVENOUS; SUBCUTANEOUS AS NEEDED
OUTPATIENT
Start: 2024-11-07

## 2024-11-07 NOTE — OPERATIVE REPORT
[facility]  Operative Report  2024     Tejal Franz Patient Status:  Hospital Outpatient Surgery    1955 MRN [id number]   Location [facility] Attending Blaze Guido MD   Hosp Day # 0 PCP Isaura Nunn MD     Indication: Tejal is a 69 year old female with lumbar radicular    Preoperative Diagnosis:  Lumbar radiculitis [M54.16]    Postoperative Diagnosis: Same as above.    Procedure performed: Bilateral lumbar 5 TRANSFORAMINAL EPIDURAL STEROID INJECTION SINGLE LEVEL with local     Anesthesia: Local    EBL: Less than 1 ml.    Procedure Description:  After reviewing the patient's history and performing a focused physical examination, the diagnosis was confirmed and contraindications such as infection and coagulopathy were ruled out.  Following review of potential side effects and complications, including but not necessarily limited to infection, allergic reaction, local tissue breakdown, nerve injury, and paresis, the patient indicated they understood and agreed to proceed.  After obtaining the informed consent, the patient was brought to the procedure room and monitored.           In the prone position, following sterile prep and drape of the lumbar region,  the  right L5 neural foramen was identified under fluoroscopy.  The skin and subcutaneous tissue was anesthetized via 25-gauge 1.5\" needle with approximately 2 cc of 1% lidocaine.  A 22-gauge 5\" Quincke spinal needle was introduced toward the inferior aspect of the junction between the transverse process and pedicle of the  right L5 level atraumatically under fluoroscopic guidance. The needle was advanced into the anterior epidural space at this level. The needle position was confirmed under AP and lateral fluoroscopic view.  Following negative aspiration for CSF and blood, approximately 1 cc of Omnipaque 240 was injected.  An excellent contrast spread along the epidural space and the nerve root was  obtained.  At this point, 1cc of normal saline with 5 mg of dexamethasone was injected without complication.  The needle was withdrawn with stylet in situ after being flushed with 1 cc PF lidocaine.     The  left L5 neural foramen was also identified under fluoroscopy.  The skin and subcutaneous tissue was anesthetized via 25-gauge 1.5\" needle with approximately 2 cc of 1% lidocaine.  A 22-gauge 5\" Quincke spinal needle was introduced toward the inferior aspect of the junction between the transverse process and pedicle of the left L5 level atraumatically under fluoroscopic guidance. The needle was advanced into the anterior epidural space at this level. The needle position was confirmed under AP and lateral fluoroscopic view.  Following negative aspiration for CSF and blood, approximately 1 cc of Omnipaque 240 was injected.  An excellent contrast spread along the epidural space and the nerve root was obtained.  At this point, 1cc of normal saline with 5 mg of dexamethasone was injected without complication.  The needle was withdrawn with stylet in situ after being flushed with 1 cc PF lidocaine..  The patient tolerated procedure very well.  The patient was observed until discharge criteria met.  Discharge instructions were given and patient was released to a responsible adult.       Complications: None.    Follow up:  The patient was followed in the pain clinic as needed basis.        Blaze Guido MD

## 2024-11-07 NOTE — DISCHARGE INSTRUCTIONS
Home Care Instructions Following Your Pain Procedure     Tejal,  It has been a pleasure to have you as our patient. To help you at home, you must follow these general discharge instructions. We will review these with you before you are discharged. It is our hope that you have a complete and uneventful recovery from our procedure.     General Instructions:  What to Expect:  Bandages from your procedure today can be removed when you get home.  Please avoid soaking and/or swimming for 24 hours.  Showering is okay  It is normal to have increased pain symptoms and/or pain at injection site for up to 3-5 days after procedure, you can use heat or ice (20 minutes on 20 minutes off) for comfort.  You may experience some temporary side effects which may include restlessness or insomnia, flushing of the face, or heart palpitations.  Please contact the provider if these symptoms do not resolve within 3-4 days.  Lightheadedness or nausea may occur and should resolve within 24 to 48 hours.  If you develop a headache after treatment, rest, drink fluids (with caffeine, if possible) and take mild over-the-counter pain medication.  If the headache does not improve with the above treatment, contact the physician.  Home Medications:  Resume all previously prescribed medication.  Please avoid taking NSAIDs (Non-Steriodal Anti-Inflammatory Drugs) such as:  Ibuprofen ( Advil, Motrin) Aleve (Naproxen), Diclofenac, Meloxicam for 6 hours after procedure.   If you are on Coumadin (Warfarin) or any other anti-coagulant (or \"blood thinning\") medication such as Plavix (Clopidogrel), Xarelto (Rivaroxaban), Eliquis (Apixaban), Effient (Prasugrel) etc., restart on the following day from the procedure unless otherwise directed by your provider.  If you are a diabetic, please increase the frequency of your glucose monitoring after the procedure as steroids may cause a temporary (2-3 day) increase in your blood sugar.  Contact your primary care  physician if your blood sugar remains elevated as you may require some medication adjustment.  Diet:  Resume your regular diet as tolerated.  Activity:  We recommend that you relax and rest during the rest of your procedure day.  If you feel weakness in your arms or legs do not drive.  Follow-up Appointment  Please schedule a follow-up visit within 3 to 4 weeks after your last procedure date.  Question or Concerns:  Feel free to call our office with any questions or concerns at [phone] (option #2)    Tejal  Thank you for coming to [facility] for your procedure.  The nurses try very hard to make sure you receive the best care possible.  Your trust in them as well as us is greatly appreciated.    Thanks so much,   Dr. Blaze Guido

## 2024-11-07 NOTE — H&P
History & Physical Examination    Patient Name: Tejal Franz  MRN: [id number]  CSN: [id number]  YOB: 1955    Pre-Operative Diagnosis:  Lumbar radiculitis [M54.16]    Present Illness: Radiculitis    ASA: 3  MP class: 1  Sedation: Local      Prescriptions Prior to Admission[1]  No current facility-administered medications for this encounter.       Allergies: Allergies[2]    Past Medical History:    Arthritis    First Treatment by Dr. Prince    Depression    Obesity, unspecified     Past Surgical History:   Procedure Laterality Date    Cataract            Other surgical history      rectovag fistula    Other surgical history      gum surgery    Other surgical history      wisdom tooth     Family History   Problem Relation Age of Onset    Heart Disorder Father         pacemaker    Other (Other) Father         COPD    Heart Disorder Mother         MI; smoker    Depression Mother     Diabetes Mother     Obesity Mother     Psychiatric Mother     Cancer Paternal Grandmother     Depression Sister     Obesity Sister     Psychiatric Sister      Social History     Tobacco Use    Smoking status: Former     Current packs/day: 0.00     Average packs/day: 1.8 packs/day for 32.0 years (57.6 ttl pk-yrs)     Types: Cigarettes     Start date: 1972     Quit date: 2004     Years since quittin.8    Smokeless tobacco: Never    Tobacco comments:     quit about 8 years ago     Updated 24   Substance Use Topics    Alcohol use: Yes     Alcohol/week: 2.0 standard drinks of alcohol     Types: 2 Glasses of wine per week     Comment: I only have alcohol occasionally.       SYSTEM Check if Review is Normal Check if Physical Exam is Normal If not normal, please explain:   HEENT [x ] [x ]    NECK & BACK [x ] [x ]    HEART [x ] [x ]    LUNGS [x ] [x ]    ABDOMEN [x ] [x ]    UROGENITAL [x ] [x ]    EXTREMITIES [x ] [x ]    OTHER        [ x ] I have discussed the risks and benefits and alternatives with  the patient/family.  They understand and agree to proceed with plan of care.  [ x ] I have reviewed the History and Physical done within the last 30 days.  Any changes noted above.    Blaze Guido MD              [1]   Facility-Administered Medications Prior to Admission   Medication Dose Route Frequency Provider Last Rate Last Admin    [COMPLETED] Hylan G-F 20 SOSY   Intra-articular Once    Given at 24 1003    [COMPLETED] Hylan G-F 20 SOSY   Intra-articular Once    Given at 24 1003    [COMPLETED] Hylan G-F 20 SOSY   Intra-articular Once    Given at 24 0948    [COMPLETED] Hylan G-F 20 SOSY   Intra-articular Once    Given at 24 0948    [COMPLETED] Hylan G-F 20 SOSY   Intra-articular Once    Given at 24 0946    [COMPLETED] Hylan G-F 20 SOSY   Intra-articular Once    Given at 24 0946    [COMPLETED] ketorolac (Toradol) 30 MG/ML injection 30 mg  30 mg Intramuscular Once    30 mg at 24 1214    [COMPLETED] triamcinolone acetonide (Kenalog-40) 40 MG/ML injection 40 mg  40 mg Intra-articular Once    40 mg at 24 1214    [COMPLETED] ketorolac (Toradol) 30 MG/ML injection 30 mg  30 mg Intramuscular Once    30 mg at 24 1214    [COMPLETED] triamcinolone acetonide (Kenalog-40) 40 MG/ML injection 40 mg  40 mg Intra-articular Once    40 mg at 24 1214     Medications Prior to Admission   Medication Sig Dispense Refill Last Dose/Taking    traMADol 50 MG Oral Tab Please take 1 to 2 tablets every 4 hours as needed for pain. 10 tablet 1 2024    diclofenac 75 MG Oral Tab EC Take 1 tablet (75 mg total) by mouth 2 (two) times daily. 28 tablet 1 2024    buPROPion  MG Oral Tablet 24 Hr Take 1 tablet (150 mg total) by mouth every morning. 30 tablet 0 2024    naltrexone 50 MG Oral Tab Take 0.5 tablets (25 mg total) by mouth daily. For cravings 15 tablet 0 Past Week    [] cyclobenzaprine 10 MG Oral Tab Take 1 tablet (10 mg total) by mouth nightly for 20  days. 20 tablet 0     escitalopram (LEXAPRO) 20 MG Oral Tab Take 1 tablet (20 mg total) by mouth daily. 90 tablet 1 11/6/2024    gabapentin 600 MG Oral Tab Take 1 tablet (600 mg total) by mouth 3 (three) times daily. 270 tablet 0 11/6/2024    Calcium Carbonate-Vitamin D 600-400 MG-UNIT Oral Tab Take 1 tablet by mouth daily.   11/6/2024    Multiple Vitamins-Minerals (CENTRUM SILVER) Oral Tab Take 1 tablet by mouth daily.   11/6/2024   [2] No Known Allergies

## 2024-11-10 RX ORDER — TIRZEPATIDE 2.5 MG/.5ML
2.5 INJECTION, SOLUTION SUBCUTANEOUS WEEKLY
Qty: 2 ML | Refills: 3 | Status: SHIPPED | OUTPATIENT
Start: 2024-11-10

## 2024-11-20 ENCOUNTER — TELEPHONE (OUTPATIENT)
Facility: CLINIC | Age: 69
End: 2024-11-20

## 2024-11-20 ENCOUNTER — OFFICE VISIT (OUTPATIENT)
Facility: CLINIC | Age: 69
End: 2024-11-20
Payer: MEDICARE

## 2024-11-20 VITALS — HEIGHT: 61 IN | WEIGHT: 289 LBS | BODY MASS INDEX: 54.56 KG/M2

## 2024-11-20 DIAGNOSIS — M16.11 PRIMARY OSTEOARTHRITIS OF RIGHT HIP: Primary | ICD-10-CM

## 2024-11-20 DIAGNOSIS — M16.12 PRIMARY OSTEOARTHRITIS OF LEFT HIP: ICD-10-CM

## 2024-11-20 PROCEDURE — 99214 OFFICE O/P EST MOD 30 MIN: CPT | Performed by: FAMILY MEDICINE

## 2024-11-20 PROCEDURE — 20611 DRAIN/INJ JOINT/BURSA W/US: CPT | Performed by: FAMILY MEDICINE

## 2024-11-20 RX ORDER — KETOROLAC TROMETHAMINE 30 MG/ML
30 INJECTION, SOLUTION INTRAMUSCULAR; INTRAVENOUS ONCE
Status: COMPLETED | OUTPATIENT
Start: 2024-11-20 | End: 2024-11-20

## 2024-11-20 RX ORDER — TRIAMCINOLONE ACETONIDE 40 MG/ML
40 INJECTION, SUSPENSION INTRA-ARTICULAR; INTRAMUSCULAR ONCE
Status: COMPLETED | OUTPATIENT
Start: 2024-11-20 | End: 2024-11-20

## 2024-11-20 RX ADMIN — TRIAMCINOLONE ACETONIDE 40 MG: 40 INJECTION, SUSPENSION INTRA-ARTICULAR; INTRAMUSCULAR at 10:12:00

## 2024-11-20 RX ADMIN — KETOROLAC TROMETHAMINE 30 MG: 30 INJECTION, SOLUTION INTRAMUSCULAR; INTRAVENOUS at 10:12:00

## 2024-11-20 NOTE — TELEPHONE ENCOUNTER
Left message and my chart message to clarify which injection she is planning on getting.    Last bilateral knee injection 9/5/24 gel     Patient came in [facility] office and rescheduled appointment for MEJIA knee injection to [facility].    No approval in chart yet.     Future Appointments   Date Time Provider Department Center   12/10/2024 10:20 AM Bjorn Ramirez, DO [facility]     Please advise.

## 2024-11-20 NOTE — PROGRESS NOTES
Sports Medicine Clinic Note    Subjective:    Chief Complaint: Persistent bilateral hip pain with partial improvement.    Interval History: The patient is a 68-year-old female presenting for follow-up and repeat bilateral intra-articular hip injections. She reports some relief following the last injections on 8/20/2024, particularly in reducing groin pain, though discomfort persists. No new trauma, injuries, or neurologic symptoms are reported. She states she just started GLP-1 injections with no s/e. She remains compliant with activity modification and weight loss recommendations. No changes in bowel or bladder function.    Objective:    Body mass index is 54.61 kg/m².    Bilateral Hip Examination:    Inspection: No erythema, warmth, or deformity. Mild antalgic gait observed.  Palpation: Tenderness localized to bilateral groin regions. No tenderness over the greater trochanters.  Neurovascular: Sensation intact bilaterally to light touch in femoral, obturator, and sciatic nerve distributions. Pulses palpable and symmetric in femoral, popliteal, dorsalis pedis, and posterior tibial arteries bilaterally.    Diagnostic Tests:    No new imaging performed.    Review of Prior Imaging (X-rays):  Mild to moderate bilateral hip osteoarthritis with joint space narrowing, subchondral sclerosis, and marginal osteophytes.    Assessment:    Bilateral hip osteoarthritis with persistent groin pain, partially responsive to prior injections.  Lumbar radiculopathy, previously improved with lumbar transforaminal epidural steroid injection.  Obesity, contributing to mechanical load on weight-bearing joints.    Plan:    Procedures: Proceed with repeat bilateral intra-articular hip injections today under ultrasound guidance.  Medications: Continue NSAIDs and acetaminophen as needed. Gabapentin at 600 mg TID to be maintained.  Therapy: Encourage low-impact activities for weight management, such as swimming or cycling. Physical therapy  remains deferred at this time.  Activity Recommendations: Avoid high-impact activities and focus on gentle hip range-of-motion exercises. Gradual return to normal activity as tolerated post-injection.  Post-Injection Instructions: Avoid strenuous activity for 24-48 hours. Ice or acetaminophen can be used for pain relief. Monitor for signs of infection or allergic reaction.    Follow-Up: Tentatively scheduled in 3-4 weeks to evaluate the response to today's injections and adjust the treatment plan if needed.    Ultrasound Guided Procedure Note:     After discussion of the risks and benefits, the patient elected to proceed with a therapeutic injection into the bilateral hips under US guidance. Confirmed that the patient does not have history of prior adverse reactions, active infections, or relevant allergies. There was no erythema or warmth, and the skin was clear.     For each hip:     The skin was sterilized with ChloraPrep. A 22 gauge needle was inserted via inferolateral approach utilizing US for needle guidance and placement. The site was injected with a mixture of 1 mL of kenalog 40 mg/mL, 1 mL of Toradol 30 mg/mL and 1 mL of 1% Lidocaine without Epinephrine. The injection was completed without complication, and a bandage was applied.     The patient tolerated the procedure well and was instructed to avoid strenuous activity for the next 24-48 hours and to use ice or Tylenol for pain as needed. The patient will call immediately with any signs of infection or allergic reaction.     Post-Injection Care: The patient tolerated the procedure well. An occlusive bandage was placed over the injection site. Post-injection care instructions provided to the patient. The patient was asked to avoid strenuous activity and continue to rest the area for 2-3 days before resuming regular activities. Patient advised that the area may be more painful for the first 1-2 days. They can use ice or Tylenol for pain as needed.  Patient  was instructed to watch for fever, increased swelling, or persistent pain. The patient will call immediately with any signs of infection or allergic reaction.    Complications: The patient tolerated the procedure well without any complications.      Bjorn Ramirez DO, [facility]   Primary Care Sports Medicine

## 2024-11-20 NOTE — TELEPHONE ENCOUNTER
Patient came in [facility] office and rescheduled appointment for MEJIA knee injection to [facility].    No approval in chart yet.     Future Appointments   Date Time Provider Department Center   12/10/2024 10:20 AM Bjorn Ramirez, DO [facility]     Please advise.

## 2024-11-21 NOTE — TELEPHONE ENCOUNTER
Injection for hip done 11/21/24    Knee appointment - steroid injection     Future Appointments   Date Time Provider Department Center   11/25/2024 10:45 AM Blaze Guido MD [facility]   12/10/2024 10:20 AM Bjorn Ramirez DO [facility]    Called and spoke with the patient.  She clarified she is coming in for a cortisone injection in December.  Appointment note changed to reflect that.

## 2024-11-25 ENCOUNTER — OFFICE VISIT (OUTPATIENT)
Dept: PAIN CLINIC | Facility: CLINIC | Age: 69
End: 2024-11-25
Payer: MEDICARE

## 2024-11-25 VITALS — OXYGEN SATURATION: 96 % | SYSTOLIC BLOOD PRESSURE: 110 MMHG | DIASTOLIC BLOOD PRESSURE: 80 MMHG | HEART RATE: 68 BPM

## 2024-11-25 DIAGNOSIS — M54.16 LUMBAR RADICULITIS: Primary | ICD-10-CM

## 2024-11-25 PROCEDURE — 99214 OFFICE O/P EST MOD 30 MIN: CPT | Performed by: ANESTHESIOLOGY

## 2024-11-25 NOTE — PROGRESS NOTES
Name: Tejal Franz   : 1955   DOS: 2024     Pain Clinic Follow Up Visit:     Chief Complaint   Patient presents with    Procedure Follow Up     Post TFESI       Tejal Franz is a 69 year old female with a history of lumbar degenerative disc disease, and radicular symptoms here for follow-up after transforaminal epidural steroid injection.  [place] will with greater than 50% relief.    Pt denies any chills, fever, or weakness. There is no bladder or bowel incontinence associated with the pain.    REVIEW OF SYSTEMS:  A ten point review of systems was performed with pertinent positives and negatives in the HPI.    Allergies[1]    Current Outpatient Medications   Medication Sig Dispense Refill    Acetaminophen 500 MG Oral Cap Take 2 capsules (1,000 mg total) by mouth every 6 (six) hours for 14 days. 50 capsule 1    Tirzepatide-Weight Management (ZEPBOUND) 2.5 MG/0.5ML Subcutaneous Solution Inject 2.5 mg into the skin once a week. 2 mL 3    traMADol 50 MG Oral Tab Please take 1 to 2 tablets every 4 hours as needed for pain. 10 tablet 1    diclofenac 75 MG Oral Tab EC Take 1 tablet (75 mg total) by mouth 2 (two) times daily. 28 tablet 1    escitalopram (LEXAPRO) 20 MG Oral Tab Take 1 tablet (20 mg total) by mouth daily. 90 tablet 1    Calcium Carbonate-Vitamin D 600-400 MG-UNIT Oral Tab Take 1 tablet by mouth daily.      Multiple Vitamins-Minerals (CENTRUM SILVER) Oral Tab Take 1 tablet by mouth daily.           EXAM:   /80   Pulse 68   SpO2 96%   General:  Patient is a(n) 69 year old year old female in no acute distress.  Neurologic:: WNL-Orientation to time, place and person, normal mood & affect, concentration & attention span intact.   Inspection:  Ambulates with well-coordinated, fluid, non-antalgic gait.  Gait is normal.  Neck: Full range of motion  Cranial nerves: Grossly intact  Respiratory: Nonlabored  Back: Gait intact    IMAGES:     MRI reviewed with multilevel degenerative  changes and foraminal narrowing    ASSESSMENT AND PLAN:     1. Lumbar radiculitis      The patient is a 69-year-old female with a history of lumbar radiculitis.  This is due to multilevel lumbar degenerative disc disease and associated neuroforaminal stenosis.  Discussed with patient that permanent resolution require surgical decompression.  Patient not ready to consider surgery at this time.  Can call the office for repeat injection on a quarterly basis.      Radiology orders and consultations:None  The patient indicates understanding of these issues and agrees to the plan.  No follow-ups on file.    Blaze Guido MD, 11/25/2024, 11:01 AM              [1] No Known Allergies

## 2024-11-25 NOTE — PROGRESS NOTES
Last procedure: TFES  Date: 11/7  Percentage of relief obtained: 50%  Duration of relief: sustained    Current Pain Score: 3/10

## 2024-12-01 DIAGNOSIS — M17.12 PRIMARY OSTEOARTHRITIS OF LEFT KNEE: ICD-10-CM

## 2024-12-01 DIAGNOSIS — M43.06 LUMBAR SPONDYLOLYSIS: ICD-10-CM

## 2024-12-01 DIAGNOSIS — M16.0 PRIMARY OSTEOARTHRITIS OF BOTH HIPS: ICD-10-CM

## 2024-12-01 DIAGNOSIS — M54.16 LUMBAR RADICULOPATHY: ICD-10-CM

## 2024-12-01 DIAGNOSIS — M17.11 PRIMARY OSTEOARTHRITIS OF RIGHT KNEE: ICD-10-CM

## 2024-12-02 RX ORDER — TRAMADOL HYDROCHLORIDE 50 MG/1
TABLET ORAL
Qty: 10 TABLET | Refills: 1 | Status: SHIPPED | OUTPATIENT
Start: 2024-12-02

## 2024-12-02 RX ORDER — DICLOFENAC SODIUM 75 MG/1
75 TABLET, DELAYED RELEASE ORAL 2 TIMES DAILY
Qty: 28 TABLET | Refills: 1 | Status: SHIPPED | OUTPATIENT
Start: 2024-12-02

## 2024-12-02 NOTE — TELEPHONE ENCOUNTER
Diclofenac 75 mg    DOS: n/a  Last OV: 11/20/24  Last refill date: 11/1/24     #/refills: 28/1  Upcoming appt:   Future Appointments   Date Time Provider Department Center   12/10/2024 10:20 AM Bjorn Ramirez DO [facility]   2/19/2025 12:30 PM Shailesh Valadez APRN [facility]   3/24/2025  2:00 PM Eva Springer APRN [id number]   6/23/2025  2:00 PM Eva Springer APRN [id number]

## 2024-12-02 NOTE — TELEPHONE ENCOUNTER
Tramadol 50 mg    DOS: n/a  Last OV: 11/20/24  Last refill date: 11/1/24     #/refills: 10/1     Future Appointments   Date Time Provider Department Center   12/10/2024 10:20 AM Bjorn Ramirez DO [facility]   2/19/2025 12:30 PM Shailesh Valadez APRN [facility] [place]   3/24/2025  2:00 PM Eva Springer APRN [id number]   6/23/2025  2:00 PM Eva Springer APRN [id number]

## 2024-12-10 ENCOUNTER — OFFICE VISIT (OUTPATIENT)
Dept: ORTHOPEDICS CLINIC | Facility: CLINIC | Age: 69
End: 2024-12-10
Payer: MEDICARE

## 2024-12-10 ENCOUNTER — TELEPHONE (OUTPATIENT)
Dept: ORTHOPEDICS CLINIC | Facility: CLINIC | Age: 69
End: 2024-12-10

## 2024-12-10 VITALS — WEIGHT: 289 LBS | BODY MASS INDEX: 54.56 KG/M2 | HEIGHT: 61 IN

## 2024-12-10 DIAGNOSIS — M17.11 PRIMARY OSTEOARTHRITIS OF RIGHT KNEE: Primary | ICD-10-CM

## 2024-12-10 DIAGNOSIS — M17.12 PRIMARY OSTEOARTHRITIS OF LEFT KNEE: ICD-10-CM

## 2024-12-10 PROCEDURE — 20610 DRAIN/INJ JOINT/BURSA W/O US: CPT | Performed by: FAMILY MEDICINE

## 2024-12-10 PROCEDURE — 99214 OFFICE O/P EST MOD 30 MIN: CPT | Performed by: FAMILY MEDICINE

## 2024-12-10 RX ORDER — TRIAMCINOLONE ACETONIDE 40 MG/ML
40 INJECTION, SUSPENSION INTRA-ARTICULAR; INTRAMUSCULAR ONCE
Status: COMPLETED | OUTPATIENT
Start: 2024-12-10 | End: 2024-12-10

## 2024-12-10 RX ORDER — GABAPENTIN 300 MG/1
300 CAPSULE ORAL 3 TIMES DAILY
Qty: 90 CAPSULE | Refills: 0 | Status: CANCELLED | OUTPATIENT
Start: 2024-12-10

## 2024-12-10 RX ORDER — KETOROLAC TROMETHAMINE 30 MG/ML
30 INJECTION, SOLUTION INTRAMUSCULAR; INTRAVENOUS ONCE
Status: COMPLETED | OUTPATIENT
Start: 2024-12-10 | End: 2024-12-10

## 2024-12-10 RX ADMIN — TRIAMCINOLONE ACETONIDE 40 MG: 40 INJECTION, SUSPENSION INTRA-ARTICULAR; INTRAMUSCULAR at 10:42:00

## 2024-12-10 RX ADMIN — KETOROLAC TROMETHAMINE 30 MG: 30 INJECTION, SOLUTION INTRAMUSCULAR; INTRAVENOUS at 10:42:00

## 2024-12-10 NOTE — PROGRESS NOTES
Sports Medicine Clinic Note    Subjective:    Chief Complaint: Persistent bilateral knee pain.    Interval History: The patient is a 69-year-old female returning for follow-up regarding bilateral knee pain. She reports initial improvement in symptoms following the prior series of viscosupplementation injections; however, the pain has gradually returned, particularly with prolonged standing or walking. No new trauma or injuries reported. The patient requests corticosteroid injections for symptomatic relief and is open to medication adjustments for pain management.    Objective:    Body mass index is 54.61 kg/m².    Bilateral Knee Examination:    Inspection: No visible effusion, erythema, or deformities.  Palpation: Tenderness over the medial joint lines bilaterally, more pronounced than at the last visit. No significant tenderness over the lateral joint lines, patella, or tibial tubercle.  Range of Motion: Flexion: 0-125 degrees bilaterally with discomfort at end ranges.  Neurovascular: Sensation and vascularity intact bilaterally.    Diagnostic Tests:    No new testing. Previous imaging demonstrated advanced osteoarthritis with joint space narrowing and marginal osteophytes, most pronounced in the medial tibiofemoral compartments bilaterally.    Assessment:    Bilateral knee osteoarthritis with recurrent pain despite prior viscosupplementation.  Morbid obesity likely contributing to widespread joint pain.    Plan:    Procedures: Corticosteroid injections were performed today to both knees under sterile technique.  Medications: Increase gabapentin dosage to 900 mg TID for potential neuropathic pain. Continue Tylenol and NSAIDs as needed for pain. Lidocaine patches as before for localized lumbar pain.  Therapy: Encourage continuation of physical therapy focusing on knee mobility, hip strengthening, and core stability.  Activity Recommendations: Minimize activities that exacerbate knee pain, such as prolonged standing  or walking. Encourage gentle, low-impact activities like swimming or cycling.    Follow-Up: The patient will follow up as needed based on response to corticosteroid injections. Adjust treatment plan further if symptoms persist or worsen.    Procedure Note:    After discussion of the risks and benefits, the patient elected to proceed with a therapeutic injection into the tibiofemoral space, bilaterally. Confirmed that the patient does not have history of prior adverse reactions, active infections, or relevant allergies. There was no erythema or warmth, and the skin was clear.    For each side:    The skin was sterilized with ChloraPrep. A 22 gauge needle was inserted via inferolateral approach. The site was injected with a mixture of 1 mL of triamcinolone 40 mg/mL, 1 mL of ketorolac 30 mg/mL and 1 mL of 1% Lidocaine without Epinephrine. The injection was completed without complication, and a bandage was applied. The patient tolerated the procedure well and was instructed to avoid strenuous activity for the next 24 to 48 hours and to use ice or Tylenol for pain as needed. The patient will call immediately with any signs of infection or allergic reaction.    Post-Injection Care: The patient tolerated the procedure well. An occlusive bandage was placed over the injection site. Post-injection care instructions provided to the patient. The patient was asked to avoid strenuous activity and continue to rest the area for 2-3 days before resuming regular activities. Patient advised that the area may be more painful for the first 1-2 days. They can use ice or Tylenol for pain as needed.  Patient was instructed to watch for fever, increased swelling, or persistent pain. The patient will call immediately with any signs of infection or allergic reaction.      Bjorn Ramirez DO, [facility]   Primary Care Sports Medicine

## 2024-12-10 NOTE — TELEPHONE ENCOUNTER
Gabapentin was sent as 900 mg Gralise (once daily)    Did you mean to write regular Gabapentin 300 mg tid? (Pending)  What was the dosing

## 2024-12-11 ENCOUNTER — PATIENT MESSAGE (OUTPATIENT)
Dept: ORTHOPEDICS CLINIC | Facility: CLINIC | Age: 69
End: 2024-12-11

## 2024-12-11 RX ORDER — GABAPENTIN 300 MG/1
900 CAPSULE ORAL 3 TIMES DAILY
Qty: 270 CAPSULE | Refills: 0 | Status: CANCELLED | OUTPATIENT
Start: 2024-12-11

## 2024-12-11 RX ORDER — GABAPENTIN 800 MG/1
800 TABLET ORAL 3 TIMES DAILY
Qty: 90 TABLET | Refills: 1 | Status: SHIPPED | OUTPATIENT
Start: 2024-12-11

## 2024-12-11 NOTE — TELEPHONE ENCOUNTER
The current gabapentin says (Once-Daily) which is the extended release    Pending options  - 800 mg tid (largest immediate release in 1 tab)   - 300 mg (3 pills per dose) TID - This is a lot of pills to swallow

## 2024-12-11 NOTE — TELEPHONE ENCOUNTER
LOV 12/10/24  Gabapentin 900 mg TID ordered.  Pt reports that insurance will not covered at this dose.   Pt requesting 600 mg TID  Follow up question sent to pt.

## 2024-12-25 ENCOUNTER — PATIENT MESSAGE (OUTPATIENT)
Dept: ORTHOPEDICS CLINIC | Facility: CLINIC | Age: 69
End: 2024-12-25

## 2024-12-26 RX ORDER — DICLOFENAC SODIUM 75 MG/1
75 TABLET, DELAYED RELEASE ORAL 2 TIMES DAILY
Qty: 28 TABLET | Refills: 1 | Status: SHIPPED | OUTPATIENT
Start: 2024-12-26

## 2024-12-26 NOTE — TELEPHONE ENCOUNTER
Called patient to schedule    Received no answer    My chart message sent to patient to call to schedule after 3/5/24 as last steroid injection was done with gel was done 9/5/24

## 2024-12-26 NOTE — TELEPHONE ENCOUNTER
Diclofenac    DOS: n/a  Last OV: 12/10/24 Primary OA of right knee  Last refill date: 12/14/24  #/refills: 28/0  Upcoming appt:   Future Appointments   Date Time Provider Department Center   2/20/2025 10:00 AM Bjorn Ramirez, DO [facility]   3/13/2025  9:40 AM jBorn Ramirez, DO [facility]     Component      Latest Ref Rng 5/14/2024   Glucose      70 - 99 mg/dL 84    Sodium      136 - 145 mmol/L 139    Potassium      3.5 - 5.1 mmol/L 4.6    Chloride      98 - 112 mmol/L 107    Carbon Dioxide, Total      21.0 - 32.0 mmol/L 26.0    ANION GAP      0 - 18 mmol/L 6    BUN      9 - 23 mg/dL 27 (H)    CREATININE      0.55 - 1.02 mg/dL 0.85    CALCIUM      8.5 - 10.1 mg/dL 9.4    CALCULATED OSMOLALITY      275 - 295 mOsm/kg 292    EGFR      >=60 mL/min/1.73m2 74    AST (SGOT)      15 - 37 U/L 25    ALT (SGPT)      13 - 56 U/L 25    ALKALINE PHOSPHATASE      55 - 142 U/L 73    Total Bilirubin      0.1 - 2.0 mg/dL 0.6    PROTEIN, TOTAL      6.4 - 8.2 g/dL 7.9    Albumin      3.4 - 5.0 g/dL 3.5    Globulin      2.8 - 4.4 g/dL 4.4    A/G Ratio      1.0 - 2.0  0.8 (L)    Patient Fasting for CMP? No       Legend:  (H) High  (L) Low

## 2024-12-30 ENCOUNTER — PATIENT MESSAGE (OUTPATIENT)
Dept: PAIN CLINIC | Facility: CLINIC | Age: 69
End: 2024-12-30

## 2024-12-30 DIAGNOSIS — M48.062 SPINAL STENOSIS OF LUMBAR REGION WITH NEUROGENIC CLAUDICATION: Primary | ICD-10-CM

## 2024-12-30 NOTE — TELEPHONE ENCOUNTER
Blaze Guido MD to Tejal Franz         11/14/24 10:52 AM  Hi,  You have severe stenosis at the L5-S1 level on both sides. If the injections do not provide adequate relief, your next step would be for a surgical evaluation. Some of your symptoms may improve after the hip injection. Please keep the office updated. I can place a surgical referral if needed.    Last read by Tejal Franz at  1:03 PM on 11/21/2024.

## 2025-01-02 ENCOUNTER — TELEPHONE (OUTPATIENT)
Dept: PAIN CLINIC | Facility: CLINIC | Age: 70
End: 2025-01-02

## 2025-01-02 DIAGNOSIS — M17.12 PRIMARY OSTEOARTHRITIS OF LEFT KNEE: ICD-10-CM

## 2025-01-02 DIAGNOSIS — M54.16 LUMBAR RADICULITIS: Primary | ICD-10-CM

## 2025-01-02 DIAGNOSIS — M54.16 LUMBAR RADICULOPATHY: ICD-10-CM

## 2025-01-02 DIAGNOSIS — M43.06 LUMBAR SPONDYLOLYSIS: ICD-10-CM

## 2025-01-02 DIAGNOSIS — M17.11 PRIMARY OSTEOARTHRITIS OF RIGHT KNEE: ICD-10-CM

## 2025-01-02 DIAGNOSIS — M16.0 PRIMARY OSTEOARTHRITIS OF BOTH HIPS: ICD-10-CM

## 2025-01-02 RX ORDER — TRAMADOL HYDROCHLORIDE 50 MG/1
TABLET ORAL
Qty: 10 TABLET | Refills: 1 | Status: CANCELLED | OUTPATIENT
Start: 2025-01-02

## 2025-01-02 NOTE — TELEPHONE ENCOUNTER
Order Questions    Question Answer   Anesthesia Type Local   Provider Hay   Location [facility] Procedure Lab   Procedure Transforaminal   Laterality/Level left L4, L5   CPT (Hit enter after each entry) INJECTION, ANESTHETIC/STEROID, TRANSFORAMINAL EPIDURAL; LUMBAR/SACRAL, SINGLE LEVEL    INJECTION, ANESTHETIC/STEROID, TRANSFORAMINAL EPIDURAL; LUMBAR/SACRAL, ADD'L LEVEL   Medical clearance requested (will send to Pain Navigator) No   Patient has Medicare coverage? Yes   Comments (Please list entire procedure name here.) Left lumbar 4-5, lumbar 5-sacral 1 transforaminal epidural steroid injection

## 2025-01-02 NOTE — TELEPHONE ENCOUNTER
Order Questions    Question Answer   Anesthesia Type Local   Location [facility] Procedure Lab   Procedure Transforaminal   Laterality/Level right L4, L5   CPT (Hit enter after each entry) INJECTION, ANESTHETIC/STEROID, TRANSFORAMINAL EPIDURAL; LUMBAR/SACRAL, SINGLE LEVEL    INJECTION, ANESTHETIC/STEROID, TRANSFORAMINAL EPIDURAL; LUMBAR/SACRAL, ADD'L LEVEL   Medical clearance requested (will send to Pain Navigator) No   Patient has Medicare coverage? Yes   Comments (Please list entire procedure name here.) Lumbar 4-5, lumbar 5-sacral 1 transforaminal epidural steroid injection

## 2025-01-02 NOTE — TELEPHONE ENCOUNTER
Confirmed w/ -- that unable to do injection as bilateral with 2 level TLESI. Left side would need to be done at separate visit.     -[employer] message sent to patient with provider recommendations along w/pre procedure instructions.

## 2025-01-02 NOTE — TELEPHONE ENCOUNTER
Patient advised of insurance approval to proceed with injections and is agreeable to scheduling. Patient scheduled for procedure, pre-procedure instructions reviewed. Patient prefers Local sedation. Reviewed sedation instructions including No Fasting & No  Required. Patient encouraged but not required to hold diclofenac for 24 hours prior to procedure. Patient verbalized understanding of instructions, no further needs at this time.       [facility] PAIN CLINIC  PRE-PROCEDURE INSTRUCTIONS WITHOUT SEDATION    Procedure: Left L4/5,L5/S1 TLESI        Appointment Date: 02/18/2025  Check-In Time: 10:30 AM        Prior to the procedure:  Please update us prior to the procedure if you are experiencing any symptoms of infection such as cough, fever, chills, urinary symptoms, or have recently been prescribed antibiotics, have open wounds, have recently had surgery or dental procedures.    Day of Procedure:  **Drivers will be required for patients who receive prescriptions for Valium.    NO FASTING REQUIRED  Please bring your Insurance Card, Photo ID, List of Current Medications and Referral (if applicable) to your appointment.  Please park in the [facility] and follow the signs to the [facility].  Check in at [facility] ([address] [place] [address]) outpatient registration in the [facility].  Please note-No prescriptions will be written by Pain Clinic in OR on the day of procedure. If you require a refill of medications, please contact the office 48 hours prior to your procedure.  If you have an implanted Spinal Cord or Peripheral Nerve Stimulator: Please remember to turn device off for procedure.        Medication Hold:    Number of days you need to be off for the following medications:    Aggrenox 10 days   Agrylin (Anagrelide) 10 days  Brilinta (Ticagrelor) 7 days  Imbruvica (Ibrutinib) 3 days   Enbrel (Etanercept) 24 hours   Fragmin (Dalteparin) 24 hours   Pletal (Cilostazol) 7  days  Effient (Prasugrel) 7 days  Pradaxa 10 days  Trental 7 days  Eliquis (Apixaban) 3 days  Xarelto (Rivaroxaban) 3 days  Lovenox (Enoxaparin) 24 hours  Aspirin  Greater than 81mg but less than 325mg   5 days  325mg and greater                  7 days  Coumadin       5 days  Procedure may be cancelled if INR is elevated.   Excedrin (with aspirin) 7 days  Plavix (Clopidogrel)                            7 days    NSAIDs: 24 hours preferred      Ibuprofen (Motrin, Advil, Vicoprofen), Naproxen (Naprosyn, Aleve), Piroxcam (Feldene), Meloxicam (Mobic), Oxaprozin (Daypro), Diclofenac (Voltaren), Indomethacin (Indocin), Etodolac (Lodine), Nabumetone (Relafen), Celebrex (Celecoxib)           HERBAL SUPPLEMENTS  5 days preferred  Fish oil, krill oil, Omega-3, Vascepa, Vitamin E, Turmeric, Garlic                       Insurance Authorization:   Most insurances are now requiring a preauthorization for all procedures.  In the event that your insurance does not authorize your procedure within 48 hours of the scheduled date, your procedure will be cancelled and rescheduled to a later date.  Please contact your insurance carrier to determine what your financial responsibility will be for the procedure(s).      Cancellation/Rescheduling Appointment:   In the event you need to cancel or reschedule your appointment, you must notify the office 24 hours prior.    Post-procedure instructions:        Please schedule a follow up visit within 2 to 4 weeks after your last procedure date   Please call our office with any questions or concerns before or after your procedure at  [phone].  If you are a diabetic, please increase the frequency of your glucose monitoring after the procedure as this may cause a temporary increase in your blood sugar.  Contact your primary care physician if your blood sugar rises as you may require some medication adjustment.  It is normal to have increased pain at injection site for up to 3-5 days after  procedure, you can use heat or ice (20 minutes on 20 minutes off) for comfort.    **To hear a recorded version of these instructions, please call [phone] and follow the prompts.  **Para escuchar las instrucciones en Español, por favor de llamar el jimy [phone] opción 4.

## 2025-01-02 NOTE — TELEPHONE ENCOUNTER
Tramadol 50 mg  DOS: n/a  Last OV: 12/10/24  Last refill date: 12/2/24     #/refills: 10/1  Upcoming appt:   Future Appointments   Date Time Provider Department Center   1/24/2025 10:15 AM [id number]   2/19/2025 12:30 PM Shailesh Valadez APRN [facility] [place]   2/20/2025 10:00 AM Bjorn Ramirez DO [facility]   3/13/2025  9:40 AM Bjorn Ramirez DO [facility]

## 2025-01-02 NOTE — TELEPHONE ENCOUNTER
Patient advised of insurance approval to proceed with injections and is agreeable to scheduling. Patient scheduled for procedure, pre-procedure instructions reviewed. Patient prefers Local sedation. Reviewed sedation instructions including No Fasting & No  Required. Patient encouraged but not required to hold diclofenac for 24 hours prior to procedure. Patient verbalized understanding of instructions, no further needs at this time.    ** patient requested to be repeated as bilateral as her previous TLESI on 11/07/24.  Informed patient that will forward to provider.       [facility] PAIN CLINIC  PRE-PROCEDURE INSTRUCTIONS WITHOUT SEDATION    Procedure: Right L4/5,L5/S1 TLESI      Appointment Date: 02/11/2025  Check-In Time: 10:30 AM        Prior to the procedure:  Please update us prior to the procedure if you are experiencing any symptoms of infection such as cough, fever, chills, urinary symptoms, or have recently been prescribed antibiotics, have open wounds, have recently had surgery or dental procedures.    Day of Procedure:  **Drivers will be required for patients who receive prescriptions for Valium.    NO FASTING REQUIRED  Please bring your Insurance Card, Photo ID, List of Current Medications and Referral (if applicable) to your appointment.  Please park in the [facility] and follow the signs to the [facility].  Check in at [facility] ([address] [place] [address]) outpatient registration in the [facility].  Please note-No prescriptions will be written by Pain Clinic in OR on the day of procedure. If you require a refill of medications, please contact the office 48 hours prior to your procedure.  If you have an implanted Spinal Cord or Peripheral Nerve Stimulator: Please remember to turn device off for procedure.        Medication Hold:    Number of days you need to be off for the following medications:    Aggrenox 10 days   Agrylin (Anagrelide) 10 days  Brilinta  (Ticagrelor) 7 days  Imbruvica (Ibrutinib) 3 days   Enbrel (Etanercept) 24 hours   Fragmin (Dalteparin) 24 hours   Pletal (Cilostazol) 7 days  Effient (Prasugrel) 7 days  Pradaxa 10 days  Trental 7 days  Eliquis (Apixaban) 3 days  Xarelto (Rivaroxaban) 3 days  Lovenox (Enoxaparin) 24 hours  Aspirin  Greater than 81mg but less than 325mg   5 days  325mg and greater                  7 days  Coumadin       5 days  Procedure may be cancelled if INR is elevated.   Excedrin (with aspirin) 7 days  Plavix (Clopidogrel)                            7 days    NSAIDs: 24 hours preferred      Ibuprofen (Motrin, Advil, Vicoprofen), Naproxen (Naprosyn, Aleve), Piroxcam (Feldene), Meloxicam (Mobic), Oxaprozin (Daypro), Diclofenac (Voltaren), Indomethacin (Indocin), Etodolac (Lodine), Nabumetone (Relafen), Celebrex (Celecoxib)           HERBAL SUPPLEMENTS  5 days preferred  Fish oil, krill oil, Omega-3, Vascepa, Vitamin E, Turmeric, Garlic                       Insurance Authorization:   Most insurances are now requiring a preauthorization for all procedures.  In the event that your insurance does not authorize your procedure within 48 hours of the scheduled date, your procedure will be cancelled and rescheduled to a later date.  Please contact your insurance carrier to determine what your financial responsibility will be for the procedure(s).      Cancellation/Rescheduling Appointment:   In the event you need to cancel or reschedule your appointment, you must notify the office 24 hours prior.    Post-procedure instructions:        Please schedule a follow up visit within 2 to 4 weeks after your last procedure date   Please call our office with any questions or concerns before or after your procedure at  [phone].  If you are a diabetic, please increase the frequency of your glucose monitoring after the procedure as this may cause a temporary increase in your blood sugar.  Contact your primary care physician if your blood sugar  rises as you may require some medication adjustment.  It is normal to have increased pain at injection site for up to 3-5 days after procedure, you can use heat or ice (20 minutes on 20 minutes off) for comfort.    **To hear a recorded version of these instructions, please call [phone] and follow the prompts.  **Para escuchar las instrucciones en Español, por favor de llamar el jimy [phone] opción 4.

## 2025-01-03 ENCOUNTER — TELEPHONE (OUTPATIENT)
Dept: ORTHOPEDICS CLINIC | Facility: CLINIC | Age: 70
End: 2025-01-03

## 2025-01-03 DIAGNOSIS — M54.50 LOW BACK PAIN, UNSPECIFIED BACK PAIN LATERALITY, UNSPECIFIED CHRONICITY, UNSPECIFIED WHETHER SCIATICA PRESENT: Primary | ICD-10-CM

## 2025-01-03 NOTE — TELEPHONE ENCOUNTER
X-Ray ordered. Please schedule with the patient. Thank you    - previous imaging older than 6 months

## 2025-01-03 NOTE — TELEPHONE ENCOUNTER
Patient is seeing Dr. Martinez for back pain. Please advise if imaging is needed.   Future Appointments   Date Time Provider Department Center   1/6/2025  2:40 PM Yuriy Martinez MD [facility]   1/7/2025  4:30 PM Danya Abrams, PT [facility] [place]   1/9/2025  4:30 PM Danya Abrams, PT [facility] [place]   1/13/2025  2:30 PM Danya Abrams, PT [facility] [place]   1/17/2025 12:30 PM Sauder, Josephine, PT [facility] [place]   1/21/2025  3:45 PM Sauder, Josephine, PT [facility] [place]   1/23/2025  3:00 PM SauderYeyona, PT [facility] [place]   1/28/2025  3:00 PM SaJosephine wagner, PT [facility] [place]   1/31/2025  1:00 PM Danya Abrams, PT [facility] [place]   2/19/2025 12:30 PM Shailesh Valadez APRN [facility] [place]   2/20/2025 10:00 AM Bjorn Ramirez, DO [facility]   3/13/2025  9:40 AM Bjorn Ramirez, DO [facility]   3/20/2025  9:40 AM Bjorn Ramirez, DO [facility]   3/24/2025  2:00 PM Eva Springer APRN [id number]   3/27/2025  9:40 AM Bjorn Ramirez, DO [facility]   6/23/2025  2:00 PM Eva Springer APRN [id number]

## 2025-01-06 ENCOUNTER — HOSPITAL ENCOUNTER (OUTPATIENT)
Dept: GENERAL RADIOLOGY | Age: 70
Discharge: HOME | End: 2025-01-06
Attending: STUDENT IN AN ORGANIZED HEALTH CARE EDUCATION/TRAINING PROGRAM
Payer: MEDICARE

## 2025-01-06 ENCOUNTER — OFFICE VISIT (OUTPATIENT)
Dept: ORTHOPEDICS CLINIC | Facility: CLINIC | Age: 70
End: 2025-01-06
Payer: MEDICARE

## 2025-01-06 DIAGNOSIS — M17.11 PRIMARY OSTEOARTHRITIS OF RIGHT KNEE: ICD-10-CM

## 2025-01-06 DIAGNOSIS — M17.12 PRIMARY OSTEOARTHRITIS OF LEFT KNEE: ICD-10-CM

## 2025-01-06 DIAGNOSIS — M43.06 LUMBAR SPONDYLOLYSIS: ICD-10-CM

## 2025-01-06 DIAGNOSIS — M43.10 SPONDYLISTHESIS: Primary | ICD-10-CM

## 2025-01-06 DIAGNOSIS — M16.0 PRIMARY OSTEOARTHRITIS OF BOTH HIPS: ICD-10-CM

## 2025-01-06 DIAGNOSIS — M54.16 LUMBAR RADICULOPATHY: ICD-10-CM

## 2025-01-06 DIAGNOSIS — M54.50 LOW BACK PAIN, UNSPECIFIED BACK PAIN LATERALITY, UNSPECIFIED CHRONICITY, UNSPECIFIED WHETHER SCIATICA PRESENT: ICD-10-CM

## 2025-01-06 PROCEDURE — 72100 X-RAY EXAM L-S SPINE 2/3 VWS: CPT | Performed by: STUDENT IN AN ORGANIZED HEALTH CARE EDUCATION/TRAINING PROGRAM

## 2025-01-06 RX ORDER — TRAMADOL HYDROCHLORIDE 50 MG/1
TABLET ORAL
Qty: 10 TABLET | Refills: 1 | Status: CANCELLED | OUTPATIENT
Start: 2025-01-06

## 2025-01-06 NOTE — H&P
[facility] - ORTHOPED[facility]  [address], [address], [place] [address]  [address] [place] [address]  [phone]     NEW PATIENT VISIT - HISTORY AND PHYSICAL EXAMINATION     Name: Tejal Franz   MRN: [id number]  Date: 25       CC: back and leg pain    REFERRED BY: Isaura Nunn MD    HPI:   Tejal Franz is a very pleasant 69 year old female who presents today for evaluation of back and leg pain. The distribution of symptoms are: 10% backpain and 90% leg pain. The symptoms began 8 month(s) ago without any significant injury. Since the onset, the symptoms have become slowly worse over time. Patient feels pain is aggravated by walking, standing and improved by rest. The patient reports no numbness and no weakness.  The symptom characteristics are as follows: Patient is a 69-year-old female presenting with back pain radiating down both legs, currently right worse than left.  Patient has had brief physical therapy and multiple epidural steroid injections without sustained relief..     Prior spine surgery: none.    Bowel and bladder symptoms: absent.    The patient has not had issues with balance and/or hand dexterity problems such as changes in penmanship or the use of buttons or zippers.    Treatment up to this time has included:    Evaluation: PCP and other [facility] provider  NSAIDS: have not been helpful  Narcotic use: None  Physical therapy: previously, pending new round  Spinal injections: LESI  Others:       PMH:   Past Medical History:    Arthritis    First Treatment by Dr. Prince    Depression    Obesity, unspecified       PAST SURGICAL HX:  Past Surgical History:   Procedure Laterality Date    Cataract            Other surgical history  1978    rectovag fistula    Other surgical history      gum surgery    Other surgical history      wisdom tooth       FAMILY HX:  Family History   Problem Relation Age of Onset    Heart Disorder Father          pacemaker    Other (Other) Father         COPD    Heart Disorder Mother         MI; smoker    Depression Mother     Diabetes Mother     Obesity Mother     Psychiatric Mother     Cancer Paternal Grandmother     Depression Sister     Obesity Sister     Psychiatric Sister        ALLERGIES:  Patient has no known allergies.    MEDICATIONS:   Current Outpatient Medications   Medication Sig Dispense Refill    diclofenac 75 MG Oral Tab EC Take 1 tablet (75 mg total) by mouth 2 (two) times daily. 28 tablet 1    gabapentin 800 MG Oral Tab Take 1 tablet (800 mg total) by mouth 3 (three) times daily. 90 tablet 1    traMADol 50 MG Oral Tab Please take 1 to 2 tablets every 4 hours as needed for pain. 10 tablet 1    Tirzepatide-Weight Management (ZEPBOUND) 2.5 MG/0.5ML Subcutaneous Solution Inject 2.5 mg into the skin once a week. 2 mL 3    escitalopram (LEXAPRO) 20 MG Oral Tab Take 1 tablet (20 mg total) by mouth daily. 90 tablet 1    Calcium Carbonate-Vitamin D 600-400 MG-UNIT Oral Tab Take 1 tablet by mouth daily.      Multiple Vitamins-Minerals (CENTRUM SILVER) Oral Tab Take 1 tablet by mouth daily.         ROS: A comprehensive 14 point review of systems was performed and was negative aside from the aforementioned per history of present illness.    SOCIAL HX:  Social History     Tobacco Use    Smoking status: Former     Current packs/day: 0.00     Average packs/day: 1.8 packs/day for 32.0 years (57.6 ttl pk-yrs)     Types: Cigarettes     Start date: 1972     Quit date: 2004     Years since quittin.0    Smokeless tobacco: Never    Tobacco comments:     quit about 8 years ago     Updated 24   Substance Use Topics    Alcohol use: Yes     Alcohol/week: 2.0 standard drinks of alcohol     Types: 2 Glasses of wine per week     Comment: I only have alcohol occasionally.         PE:   There were no vitals filed for this visit.  Estimated body mass index is 54.61 kg/m² as calculated from the following:    Height as  of 12/10/24: 5' 1\" (1.549 m).    Weight as of 12/10/24: 289 lb (131.1 kg).    Physical Exam  Constitutional:       Appearance: Normal appearance.   HENT:      Head: Normocephalic and atraumatic.   Eyes:      Extraocular Movements: Extraocular movements intact.   Cardiovascular:      Pulses: Normal pulses. Skin warm and well perfused.  Pulmonary:      Effort: Pulmonary effort is normal. No respiratory distress.   Skin:     General: Skin is warm.   Psychiatric:         Mood and Affect: Mood normal.     Spine Exam:    Normal gait without difficulty  Able to heel, toe, tandem gait without difficulty  Level shoulders and hips in even stance    Restricted L-spine ROM    No tenderness to palpation of L-spine    Straight leg raise test: negative    Sustained clonus: negative    LE Strength: 5/5 IP QUAD TA EHL GSC  LE Sensation: normal in L2-S1 distribution  LE reflexes: normal    Radiographic Examination/Diagnostics:  XR and MRI personally viewed, independently interpreted and radiology report was reviewed.  X-ray of the lumbar spine demonstrates grade 2 spondylolisthesis at L5-S1  MRI of the lumbar spine demonstrates severe foraminal stenosis bilaterally    IMPRESSION: Tejal Franz is a 69 year old female with L5-S1 spondylolisthesis and severe spinal stenosis with radiculopathy    PLAN:     We reviewed the patients history, symptoms, exam findings, and imaging today.  We had a detailed discussion outlining the etiology, anatomy, pathophysiology, and natural history of lumbar stenosis. The typical management of this condition may include lifestyle modification, NSAIDs, physical therapy, oral steroids, epidural injections, neuromodulatory medications, and sometimes pain medications.      Patient has multiple pain generators including bilateral hip arthritis and knee arthritis.  Patient is fairly deconditioned and sedentary at this time.  I discussed with patient surgery, which includes L5-S1 laminectomy and fusion.   However given patients weight as well as current sedentary status, patient would likely be increased risk for perioperative complications and prolonged recovery.    Recommend trial of conservative treatment with scheduled PT and ANGELA and consider surgery if symptoms fail to improve.    FOLLOW-UP:  We will see her back in follow-up in 3 months, or sooner if any problems arise. Patient understands and agrees with plan.      Yuriy Martinez MD  Orthopedic Spine Surgeon  [facility]   [address] [place] [address].[email or web address]  [email or web address]  t: [phone]   f: [phone]        This note was dictated using Dragon software.  While it was briefly proofread prior to completion, some grammatical, spelling, and word choice errors due to dictation may still occur.

## 2025-01-06 NOTE — TELEPHONE ENCOUNTER
Tramadol 50 mg  DOS: n/a  Last OV: 12/10/24  Last refill date: 12/2/24     #/refills: 10/1  Upcoming appt:   Future Appointments   Date Time Provider Department Center   1/7/2025  4:30 PM Danya Abrams, PT [facility] [place]   1/9/2025  4:30 PM Danya Abrams, PT [facility] [place]   1/13/2025  2:30 PM Danya Abrams, PT [facility] [place]   1/17/2025 12:30 PM Josephine Antony, PT [facility] [place]   1/21/2025  3:45 PM SaYeyo wagnerna, PT [facility] [place]   1/23/2025  3:00 PM SaJosephine wagner, PT [facility] [place]   1/28/2025  3:00 PM SaYeoy wagnerna, PT [facility] [place]   1/31/2025  1:00 PM Danya Abrams, PT [facility] [place]   2/19/2025 12:30 PM Shailesh Valadez APRN [facility] [place]   2/20/2025 10:00 AM Bjorn Ramirez,  [facility]

## 2025-01-06 NOTE — TELEPHONE ENCOUNTER
Left voice mail for patient to arrive 15 min early for xray prior to Dr. Martinez appointment.     Patient already scheduled it for 1/7 when Dr. Martinez appointment is 1/6 today

## 2025-01-07 ENCOUNTER — OFFICE VISIT (OUTPATIENT)
Dept: PHYSICAL THERAPY | Age: 70
End: 2025-01-07
Attending: ANESTHESIOLOGY
Payer: MEDICARE

## 2025-01-07 DIAGNOSIS — M48.062 SPINAL STENOSIS OF LUMBAR REGION WITH NEUROGENIC CLAUDICATION: Primary | ICD-10-CM

## 2025-01-07 PROCEDURE — 97110 THERAPEUTIC EXERCISES: CPT

## 2025-01-07 PROCEDURE — 97162 PT EVAL MOD COMPLEX 30 MIN: CPT

## 2025-01-07 RX ORDER — TRAMADOL HYDROCHLORIDE 50 MG/1
TABLET ORAL
Qty: 10 TABLET | Refills: 1 | Status: SHIPPED | OUTPATIENT
Start: 2025-01-07

## 2025-01-07 RX ORDER — NAPROXEN 500 MG/1
500 TABLET ORAL
Qty: 90 TABLET | Refills: 2 | Status: SHIPPED | OUTPATIENT
Start: 2025-01-07 | End: 2025-04-07

## 2025-01-07 NOTE — PROGRESS NOTES
SPINE EVALUATION:     Diagnosis:   Lumbar spine stenosis      Referring Provider: Blaze Guido  Date of Evaluation:    1/7/2025    Precautions:   Obesity Next MD visit:   2/11/2025,   Date of Surgery: n/a     PATIENT SUMMARY   Tejal Franz is a 69 year old female who presents to therapy today with complaints of pain B LE since April 2024. Pt has been treated with [facility], Physical Therapy, Acupuncture and Chiropractic treatments with no lasting improvement. Pt is severely overweight mostly sedentary, she sits in a recliner during the day and sleeps in the recliner at night. Pain is constant, aggravated by standing and walking. Pt lives in a bi-level, 10 steps to bathroom. Pt takes Tramadol, Diclofenac and Gabapentin daily.   Pt describes pain level current 8/10, at best 8/10, at worst 9/10.   Current functional limitations include standing <5 mins, walking with Rolator 150 feet, stairs one step at a time.using cane and holding railing.      Tejal describes prior level of function prior to April walking without assistive device.  Pt goals include to avoid surgery.  Past medical history was reviewed with Tejal. Significant findings include HTN, Prediabetes, Obesity, Depression, Dysthymic disorder, Hypersomnia, OA multiple joints  Pt denies diplopia, dysarthria, dysphasia, dizziness, drop attacks, bowel/bladder changes, saddle anesthesia, and MEJIA LE N/T.    ASSESSMENT  Tejal presents to physical therapy evaluation with primary c/o chronic referred B LE pain. The results of the objective tests and measures show pt is unable to stand upright without support, pain with all movements of the lumbar spine, weakness of core, LE and UE muscles, generally severely deconditioned.  Functional deficits include but are not limited to standing < 5 mins, walking 200 feet using Rolator, steps one at a time holding railing and cane.  Signs and symptoms are consistent with diagnosis of lumbar stenosis with B LE referred pain.  Pt and PT discussed evaluation findings, pathology, POC and HEP.  Pt voiced understanding and performs HEP correctly without reported pain. Skilled Physical Therapy is medically necessary to address the above impairments and reach functional goals.     OBJECTIVE:   Observation/Posture: Obese female, arrived using Rolator leaning forward onto arms, unable to stand upright  Neuro Screen: +ve neural tension B LE    Lumbar spine AROM: (* denotes performed with pain)  Flexion: 50%  Extension: 50%  Sidebending: R 50%; L 50%  Rotation: R 50%; L 50%    Accessory motion: N/A   Palpation: N/A    Strength: (* denotes performed with pain)  LE   Hip flexion (L2): R 3-/5; L 3-/5  Hip abduction: R 2/5; L 2/5  Hip Extension: R 2/5; L 2/5   Hip ER: R 2/5; L 2/5  Hip IR: 3/5; L 3/5  Knee Flexion: R 3/5; L 3/5   Knee extension (L3): R 3/5; L 3/5   DF (L4): R 3/5; L 3/5  Great Toe Ext (L5): R 3/5, L 3/5  PF (S1): R 3-/5; L 3-/5     Flexibility:   LE   Hip Flexor: mod tightness  Hamstrings: min tightness  Piriformis: mod tightness  Quads: mod tightness  Gastroc-soleus: min tightness     Gait: pt ambulates on level ground with assistive device of Rolator, decreased foot clearance bilateral, and stooped posture/forward lean.  Balance: unable to assess    Today’s Treatment and Response: Seated LAQ R/L x 10, supine heel slides R/L x 10, hook lying abd bracing 10 x 3 sec hold, LTR x 10  Pt education was provided on exam findings, treatment diagnosis, treatment plan, expectations, and prognosis. Pt was also provided recommendations for postural corrections, importance of remaining active, and strategies to reduce fall risk at home  Patient was instructed in and issued a HEP for: above exercises    Charges: PT Eval Moderate Complexity, EX 1      Total Timed Treatment: 15 min     Total Treatment Time: 45 min     Based on clinical rationale and outcome measures, this evaluation involved Moderate Complexity decision making due to 3+ personal  factors/comorbidities, 4+ body structures involved/activity limitations, and unstable symptoms including changing pain levels.  PLAN OF CARE:    Goals: (to be met in 8 visits)   Pt will be able to perform 10 mins on Nu step for general conditioning  Increase hip and knee muscle strength to 3/5 to enable pt to stand with support for 5-10 mins  Increase core strength to enable pt to perform all transional movements without increased pain in LE's  Pt will be able to walk 500 feet using RW    Frequency / Duration: Patient will be seen for 2 x/week or a total of 8-12 visits over a 90 day period. Treatment will include: Neuromuscular Re-education, Therapeutic Exercise, and Home Exercise Program instruction    Education or treatment limitation: None  Rehab Potential:good    Oswestry Disability Index Score  Score: (Patient-Rptd) 62 % (1/2/2025 12:43 PM)      Patient/Family/Caregiver was advised of these findings, precautions, and treatment options and has agreed to actively participate in planning and for this course of care.    Thank you for your referral. Please co-sign or sign and return this letter via fax as soon as possible to [phone]. If you have any questions, please contact me at Dept: [phone]    Sincerely,  Electronically signed by therapist: Danya Abrams, PT    Physician's certification required: Yes  I certify the need for these services furnished under this plan of treatment and while under my care.    X___________________________________________________ Date____________________    Certification From: 1/7/2025  To:4/7/2025

## 2025-01-07 NOTE — TELEPHONE ENCOUNTER
Attempted to call patient to discuss instead of going back & forth with MyChart. Left message on voicemail/answering machine at home number.

## 2025-01-09 ENCOUNTER — OFFICE VISIT (OUTPATIENT)
Dept: PHYSICAL THERAPY | Age: 70
End: 2025-01-09
Attending: ANESTHESIOLOGY
Payer: MEDICARE

## 2025-01-09 PROCEDURE — 97110 THERAPEUTIC EXERCISES: CPT

## 2025-01-09 NOTE — PROGRESS NOTES
Diagnosis:   Lumbar spine stenosis         Referring Provider: Blaze Guido  Date of Evaluation:    1/7/2025    Precautions:   Obesity Next MD visit:   2/11/2025  Date of Surgery: n/a   Insurance Primary/Secondary: MEDICARE / [employer]     # Auth Visits: [id number]            Subjective: Current pain R hip, R shin, low back with standing and walking.    Pain: 7/10 current, 10/10 last night and this morning      Objective: Pt was assisted by her daughter in law, wheelchair from parking lot to clinic. Within the clinic she ambulates with 2 st canes, flexed forward posture  Pt was able to transfer on and off tx table independently with difficulty        Assessment: Pt tolerated 45 mins of therapeutic exercises with assist on some exercises.  Able to walk 40 feet x 2 using 2 st canes      Goals:    (to be met in 8 visits)   Pt will be able to perform 10 mins on Nu step for general conditioning  Increase hip and knee muscle strength to 3/5 to enable pt to stand with support for 5-10 mins  Increase core strength to enable pt to perform all transional movements without increased pain in LE's  Pt will be able to walk 500 feet using RW    Plan: Continue PT for strengthening of abdominal and LE muscles, general conditioning  Date: 1/9/2025  TX#: 2/8 Date:                 TX#: 3/ Date:                 TX#: 4/ Date:                 TX#: 5/ Date:   Tx#: 6/   Seated:  Ankle DF B x 15  LAQ R/L x 10  March hip flexion R/L x 10  Add squeeze with ball x 10       Hook lying AAROM hip and knee flex/ext R/L x 10  Hook lying clam with red t band x 15  Isometric abd bracing with head lift x 10  Isometric oblique with hands together in midline PT providing resistance R/L x 10       S lying:  Clam R/L with min assist x 10  Hip abd with min assist R/L x 10       Walk 40 feet x 2 using 2 st canes with supervision  Nu step level 3 5 mins       HEP: Seated: LAQ, march, supine: heel slides, bent leg fallout, bent leg march    Charges: EX 3       Total Timed  Treatment: There ex 45 min  Total Treatment Time: 45 min

## 2025-01-13 ENCOUNTER — OFFICE VISIT (OUTPATIENT)
Dept: PHYSICAL THERAPY | Age: 70
End: 2025-01-13
Attending: ANESTHESIOLOGY
Payer: MEDICARE

## 2025-01-13 PROCEDURE — 97110 THERAPEUTIC EXERCISES: CPT

## 2025-01-13 NOTE — PROGRESS NOTES
Diagnosis:   Lumbar spine stenosis         Referring Provider: Blaze Guido  Date of Evaluation:    1/7/2025    Precautions:   Obesity Next MD visit:   2/11/2025  Date of Surgery: n/a   Insurance Primary/Secondary: MEDICARE / [employer]     # Auth Visits: [id number]            Subjective: Current pain R hip, B shin, low back with standing and walking. My pain was really bad today, I don't what to do when it gets like that I take 2 Tramadol but it doesn't really help.     Pain: 7/10 current, 10/10 last night and this morning      Objective: Pt was assisted by her daughter in law, wheelchair from parking lot to clinic. Within the clinic she ambulates with foldable RW flexed forward posture  Pt was able to transfer on and off tx table independently with difficulty  Morbidly Obese, enrolled in wt loss treatments.       Assessment: Pt tolerated 40 mins of therapeutic exercises with assist on some exercises. Trial of single leg distraction both R/L in flexion with legs on SB and in s lying to relieve referred LE pain. Marked joint crepitation B knees with active and passive motion. Pt sleeps in recliner, pt lives alone, discussed the goal is to get her to be able to do some exercises in her bed and to increase strength to make life less difficult. Functional goals to be able to lift up her legs when climbing stairs and to get in the tub for bathing        Goals:    (to be met in 8 visits)   Pt will be able to perform 10 mins on Nu step for general conditioning  Increase hip and knee muscle strength to 3/5 to enable pt to stand with support for 5-10 mins  Increase core strength to enable pt to perform all transional movements without increased pain in LE's  Pt will be able to walk 500 feet using RW    Plan: Assess response to distraction and new exercises performed today. Continue PT for strengthening of abdominal and LE muscles, general conditioning  Date: 1/9/2025  TX#: 2/8 Date:1/13/2025                TX#: 3/8 Date:                  TX#: 4/ Date:                 TX#: 5/ Date:   Tx#: 6/   Seated:  Ankle DF B x 15  LAQ R/L x 10  March hip flexion R/L x 10  Add squeeze with ball x 10 Nu step level 3 8 mins (seat @ 7)  Supine with heels on SB lumbar flexion rolls x 20  Supine with legs resting on SB knee ext R/L x 10  LTR x 10  Single leg distraction R/L 1 min hold      Hook lying AAROM hip and knee flex/ext R/L x 10  Hook lying clam with red t band x 15  Isometric abd bracing with head lift x 10  Isometric oblique with hands together in midline PT providing resistance R/L x 10 Hook lying:  Add squeeze with ball x 10  Bent leg fallout red t band R/L x 10  Bent leg lift with assist R/L x 10  Level 1 curl up press elbows into tx table and lift head x 10          S lying:  Clam R/L with min assist x 10  Hip abd with min assist R/L x 10 S lying: R/L  Single leg distraction Gr 3 10 sec hold x 6  Assisted hip abd x 10  Open book stretch x 10      Walk 40 feet x 2 using 2 st canes with supervision  Nu step level 3 5 mins Seated lumbar flexion stretch x 3  LAQ R/L x 10      HEP: Seated: LAQ, march, supine: heel slides, bent leg fallout, bent leg march  Level 1 curl up with head lift, seated lumbar flexion, abd bracing with PPT    Charges: EX 3       Total Timed Treatment: There ex 40 min, Manual 4 min  Total Treatment Time: 44 min

## 2025-01-14 ENCOUNTER — PATIENT MESSAGE (OUTPATIENT)
Dept: FAMILY MEDICINE CLINIC | Facility: CLINIC | Age: 70
End: 2025-01-14

## 2025-01-14 ENCOUNTER — TELEPHONE (OUTPATIENT)
Dept: PAIN CLINIC | Facility: CLINIC | Age: 70
End: 2025-01-14

## 2025-01-14 ENCOUNTER — PATIENT MESSAGE (OUTPATIENT)
Dept: ORTHOPEDICS CLINIC | Facility: CLINIC | Age: 70
End: 2025-01-14

## 2025-01-14 DIAGNOSIS — M17.11 PRIMARY OSTEOARTHRITIS OF RIGHT KNEE: Primary | ICD-10-CM

## 2025-01-14 RX ORDER — CYCLOBENZAPRINE HCL 10 MG
10 TABLET ORAL NIGHTLY
Qty: 20 TABLET | Refills: 0 | Status: SHIPPED | OUTPATIENT
Start: 2025-01-14 | End: 2025-02-03

## 2025-01-14 NOTE — TELEPHONE ENCOUNTER
Patient states she is having a lot of pain everywhere. She has pain in the back all the way down to her shins. Patient also has pain in the right knee and right thigh. Patient states she can't make it to the bathroom most of the time.      Patient asking for medication for the pain.

## 2025-01-14 NOTE — TELEPHONE ENCOUNTER
ASSESSMENT AND PLAN:      1. Lumbar radiculitis       The patient is a 69-year-old female with a history of lumbar radiculitis.  This is due to multilevel lumbar degenerative disc disease and associated neuroforaminal stenosis.  Discussed with patient that permanent resolution require surgical decompression.  Patient not ready to consider surgery at this time.  Can call the office for repeat injection on a quarterly basis.        Radiology orders and consultations:None  The patient indicates understanding of these issues and agrees to the plan.  No follow-ups on file.     Blaze Guido MD, 11/25/2024, 11:01 AM      Pt is scheduled for right TLESI on 2/11/25 and left TLESI on 2/18/25.     Pt is currently prescribed gabapentin 800 mg TID, diclofenac, acetaminophen, naproxen, and Tramadol by Dr. Ramirez.     Contacted pt to advise that Dr. Ramirez can continue managing medications since this will be a temporary medication. Pt states Dr. Ramirez is no longer involved in her care as he referred pt to the [facility]. Advised pt to discuss w/ her PCP as the Pain Clinic would not prescribe narcotics over the phone and does not manage temporary narcotic scripts or narcotics under a specific morphine miligram equivalent per day. Further advised pt that we are an intervention-based clinic and if she is in need of a temporary script to get her to the point of her injections, she should discuss w/ her PCP. Informed pt that her PCP is part of [facility] and it is a system-wide policy that the PCP should manage temporary narcotic scripts or narcotics under a certain daily MME, which is what her needs currently entail. Pt ARVIND.

## 2025-01-14 NOTE — TELEPHONE ENCOUNTER
LOV 12/10/24 with Dr Ramirez  LOV 1/6/25 with Dr Martinez    Pt requesting refill of cyclobenzaprine from 08/2024-seen for bilat knee pain and lumbar radiculopathy.    Taking gabapentin 300mg, tylenol and diclofenac. Takes tramadol when about to take shower (no further refills.)  No naproxen (understands this overlaps with diclofenac.)    Is cyclobenzaprine appropriate?    \"Last week I started up with physical therapy again. I am in even more pain than before. I am assuming that may be due to muscles that haven't been used much in the past. Is it possible for you to enter a prescription for Cyclobenzaprine again? \"

## 2025-01-14 NOTE — TELEPHONE ENCOUNTER
Previous Consuelo Hogue patient.     Patient having ongoing pain since April 2024, she has osteoarthritis to bilateral hips and knees. Current pain is to bilateral shins, burning in knees, and pain to upper buttocks and thighs. She has recently done a lot of housework that has caused the pain to flare up.     Patient gets injections in her hips and epidurals for her back. She is scheduled for more epidurals in February and more hip injections after that.  Patient uses a walker and cane for assistance. Patient had her third session of PT yesterday.     Patient take oral Gabapentin 800mg TID, Diclofenac 75mg BID, and has a few Tramadol left, she only takes this for getting into the shower.     Patient spoke with Dr Guido regarding this, who is recommending patient to talk to PCP.     Patient requesting an alternate medication or recommendation for pain relief until her epidurals.

## 2025-01-17 ENCOUNTER — OFFICE VISIT (OUTPATIENT)
Dept: PHYSICAL THERAPY | Age: 70
End: 2025-01-17
Attending: ANESTHESIOLOGY
Payer: MEDICARE

## 2025-01-17 ENCOUNTER — OFFICE VISIT (OUTPATIENT)
Dept: FAMILY MEDICINE CLINIC | Facility: CLINIC | Age: 70
End: 2025-01-17
Payer: MEDICARE

## 2025-01-17 VITALS
HEART RATE: 80 BPM | SYSTOLIC BLOOD PRESSURE: 108 MMHG | TEMPERATURE: 98 F | BODY MASS INDEX: 47.78 KG/M2 | DIASTOLIC BLOOD PRESSURE: 72 MMHG | RESPIRATION RATE: 16 BRPM | HEIGHT: 61 IN | WEIGHT: 253.06 LBS

## 2025-01-17 DIAGNOSIS — M17.0 PRIMARY OSTEOARTHRITIS OF BOTH KNEES: ICD-10-CM

## 2025-01-17 DIAGNOSIS — M25.50 ARTHRALGIA OF MULTIPLE JOINTS: ICD-10-CM

## 2025-01-17 DIAGNOSIS — G89.29 CHRONIC BILATERAL LOW BACK PAIN WITH RIGHT-SIDED SCIATICA: ICD-10-CM

## 2025-01-17 DIAGNOSIS — M16.0 PRIMARY OSTEOARTHRITIS OF BOTH HIPS: Primary | ICD-10-CM

## 2025-01-17 DIAGNOSIS — M54.41 CHRONIC BILATERAL LOW BACK PAIN WITH RIGHT-SIDED SCIATICA: ICD-10-CM

## 2025-01-17 DIAGNOSIS — Z51.81 MEDICATION MONITORING ENCOUNTER: ICD-10-CM

## 2025-01-17 DIAGNOSIS — G89.29 CHRONIC PAIN OF MULTIPLE JOINTS: ICD-10-CM

## 2025-01-17 DIAGNOSIS — M25.50 CHRONIC PAIN OF MULTIPLE JOINTS: ICD-10-CM

## 2025-01-17 PROCEDURE — G2211 COMPLEX E/M VISIT ADD ON: HCPCS | Performed by: STUDENT IN AN ORGANIZED HEALTH CARE EDUCATION/TRAINING PROGRAM

## 2025-01-17 PROCEDURE — 99214 OFFICE O/P EST MOD 30 MIN: CPT | Performed by: STUDENT IN AN ORGANIZED HEALTH CARE EDUCATION/TRAINING PROGRAM

## 2025-01-17 PROCEDURE — 97110 THERAPEUTIC EXERCISES: CPT

## 2025-01-17 RX ORDER — GABAPENTIN 300 MG/1
900 CAPSULE ORAL 3 TIMES DAILY
Qty: 270 CAPSULE | Refills: 2 | Status: SHIPPED | OUTPATIENT
Start: 2025-01-17

## 2025-01-17 RX ORDER — DICLOFENAC SODIUM 75 MG/1
75 TABLET, DELAYED RELEASE ORAL 2 TIMES DAILY
Qty: 180 TABLET | Refills: 1 | Status: SHIPPED | OUTPATIENT
Start: 2025-01-17

## 2025-01-17 NOTE — PROGRESS NOTES
[facility] Family Medicine Note  25    Chief Complaint   Patient presents with    Pain     Back hips & knees since 23   Patient's brother is here and provides part of the history.  HPI:   Tejal Franz is a 69 year old female who presents for pain.    Having a lot of pain, worse on the low back going into the right leg with burning.    Has upcoming epidural procedures. Getting hip and knee injections.    Doing physical therapy.    Pain is worsening. Difficulty sleeping. Trying to sleep in recliner.    Was on tramadol for short term. It was helping with pain even taking a shower. No longer working for pain anymore.    Taking diclofenac as well. Was taking naproxen when she ran out.    Taking gabapentin 800mg TID.    Taking tylenol as well.    Wt Readings from Last 6 Encounters:   25 253 lb 1.4 oz (114.8 kg)   12/10/24 289 lb (131.1 kg)   24 289 lb (131.1 kg)   24 289 lb (131.1 kg)   10/28/24 289 lb (131.1 kg)   24 275 lb (124.7 kg)       Past Medical History:    Arthritis    First Treatment by Dr. Prince    Depression    Obesity, unspecified     Past Surgical History:   Procedure Laterality Date    Cataract            Other surgical history      rectovag fistula    Other surgical history      gum surgery    Other surgical history      wisdom tooth     Allergies[1]   HYDROcodone-acetaminophen 5-325 MG Oral Tab Take 1 tablet by mouth every 4 to 6 hours as needed for Pain. 60 tablet 0    gabapentin 300 MG Oral Cap Take 3 capsules (900 mg total) by mouth 3 (three) times daily. 270 capsule 2    diclofenac 75 MG Oral Tab EC Take 1 tablet (75 mg total) by mouth 2 (two) times daily. 180 tablet 1    cyclobenzaprine 10 MG Oral Tab Take 1 tablet (10 mg total) by mouth nightly for 20 days. 20 tablet 0    naproxen 500 MG Oral Tab Take 1 tablet (500 mg total) by mouth 3 (three) times daily with meals. 90 tablet 2    Acetaminophen 500 MG Oral Cap Take 2 capsules  (1,000 mg total) by mouth every 6 (six) hours. 240 capsule 2    Tirzepatide-Weight Management (ZEPBOUND) 2.5 MG/0.5ML Subcutaneous Solution Inject 2.5 mg into the skin once a week. 2 mL 3    escitalopram (LEXAPRO) 20 MG Oral Tab Take 1 tablet (20 mg total) by mouth daily. 90 tablet 1    Calcium Carbonate-Vitamin D 600-400 MG-UNIT Oral Tab Take 1 tablet by mouth daily.      Multiple Vitamins-Minerals (CENTRUM SILVER) Oral Tab Take 1 tablet by mouth daily.       Social History     Socioeconomic History    Marital status:     Number of children: 2   Occupational History    Occupation: office work   Tobacco Use    Smoking status: Former     Current packs/day: 0.00     Average packs/day: 1.8 packs/day for 32.0 years (57.6 ttl pk-yrs)     Types: Cigarettes     Start date: 1972     Quit date: 2004     Years since quittin.0    Smokeless tobacco: Never    Tobacco comments:     quit about 8 years ago     Updated 24   Vaping Use    Vaping status: Never Used   Substance and Sexual Activity    Alcohol use: Yes     Alcohol/week: 2.0 standard drinks of alcohol     Types: 2 Glasses of wine per week     Comment: I only have alcohol occasionally.    Drug use: Never    Sexual activity: Not Currently   Other Topics Concern    Caffeine Concern No    Stress Concern No    Weight Concern Yes    Special Diet No    Exercise No    Seat Belt No     Counseling given: Not Answered  Tobacco comments: quit about 8 years ago  Updated 24    Family History   Problem Relation Age of Onset    Heart Disorder Father         pacemaker    Other (Other) Father         COPD    Heart Disorder Mother         MI; smoker    Depression Mother     Diabetes Mother     Obesity Mother     Psychiatric Mother     Cancer Paternal Grandmother     Depression Sister     Obesity Sister     Psychiatric Sister      Family Status   Relation Status    Fa  at age 79    Mo  at age 68    PGMA (Not Specified)    Sis (Not Specified)         REVIEW OF SYSTEMS:   See HPI    EXAM:   /72   Pulse 80   Temp 97.9 °F (36.6 °C) (Temporal)   Resp 16   Ht 5' 1\" (1.549 m)   Wt 253 lb 1.4 oz (114.8 kg)   BMI 47.82 kg/m²  Estimated body mass index is 47.82 kg/m² as calculated from the following:    Height as of this encounter: 5' 1\" (1.549 m).    Weight as of this encounter: 253 lb 1.4 oz (114.8 kg).   Vital signs reviewed. Appears stated age, well groomed.  Physical Exam:  GEN:  Patient is alert and oriented x3, uncomfortable requiring repositioning  HEAD:  Normocephalic, atraumatic  HEENT:  no scleral icterus, conjunctivae clear bilaterally  LUNGS: clear to auscultation bilaterally, no rales/rhonchi/wheezing  HEART:  Regular rate and rhythm, normal S1/S2, no murmurs, rubs or gallops  NEURO:  CN 2 - 12 grossly intact, slow ambulation with walker      ASSESSMENT AND PLAN:   Patient presents for chronic pain concerns  - minimal relief with tramadol  - can try norco, but monitor tylenol intake  - and try increased dosing of gabapentin  - has upcoming pain injections  - follow up 3mo or sooner pending response to treatment    1. Primary osteoarthritis of both hips  - gabapentin 300 MG Oral Cap; Take 3 capsules (900 mg total) by mouth 3 (three) times daily.  Dispense: 270 capsule; Refill: 2  - diclofenac 75 MG Oral Tab EC; Take 1 tablet (75 mg total) by mouth 2 (two) times daily.  Dispense: 180 tablet; Refill: 1  - HYDROcodone-acetaminophen 5-325 MG Oral Tab; Take 1 tablet by mouth every 4 to 6 hours as needed for Pain.  Dispense: 60 tablet; Refill: 0    2. Primary osteoarthritis of both knees  - gabapentin 300 MG Oral Cap; Take 3 capsules (900 mg total) by mouth 3 (three) times daily.  Dispense: 270 capsule; Refill: 2  - diclofenac 75 MG Oral Tab EC; Take 1 tablet (75 mg total) by mouth 2 (two) times daily.  Dispense: 180 tablet; Refill: 1  - HYDROcodone-acetaminophen 5-325 MG Oral Tab; Take 1 tablet by mouth every 4 to 6 hours as needed for Pain.   Dispense: 60 tablet; Refill: 0    3. Arthralgia of multiple joints  - gabapentin 300 MG Oral Cap; Take 3 capsules (900 mg total) by mouth 3 (three) times daily.  Dispense: 270 capsule; Refill: 2  - diclofenac 75 MG Oral Tab EC; Take 1 tablet (75 mg total) by mouth 2 (two) times daily.  Dispense: 180 tablet; Refill: 1  - HYDROcodone-acetaminophen 5-325 MG Oral Tab; Take 1 tablet by mouth every 4 to 6 hours as needed for Pain.  Dispense: 60 tablet; Refill: 0    4. Chronic pain of multiple joints  - gabapentin 300 MG Oral Cap; Take 3 capsules (900 mg total) by mouth 3 (three) times daily.  Dispense: 270 capsule; Refill: 2  - diclofenac 75 MG Oral Tab EC; Take 1 tablet (75 mg total) by mouth 2 (two) times daily.  Dispense: 180 tablet; Refill: 1  - HYDROcodone-acetaminophen 5-325 MG Oral Tab; Take 1 tablet by mouth every 4 to 6 hours as needed for Pain.  Dispense: 60 tablet; Refill: 0    5. Chronic bilateral low back pain with right-sided sciatica  - gabapentin 300 MG Oral Cap; Take 3 capsules (900 mg total) by mouth 3 (three) times daily.  Dispense: 270 capsule; Refill: 2  - diclofenac 75 MG Oral Tab EC; Take 1 tablet (75 mg total) by mouth 2 (two) times daily.  Dispense: 180 tablet; Refill: 1  - HYDROcodone-acetaminophen 5-325 MG Oral Tab; Take 1 tablet by mouth every 4 to 6 hours as needed for Pain.  Dispense: 60 tablet; Refill: 0    6. Medication monitoring encounter  - Comp Metabolic Panel (14) [E]; Future          Meds & Refills for this Visit:  Requested Prescriptions     Signed Prescriptions Disp Refills    gabapentin 300 MG Oral Cap 270 capsule 2     Sig: Take 3 capsules (900 mg total) by mouth 3 (three) times daily.    diclofenac 75 MG Oral Tab  tablet 1     Sig: Take 1 tablet (75 mg total) by mouth 2 (two) times daily.    HYDROcodone-acetaminophen 5-325 MG Oral Tab 60 tablet 0     Sig: Take 1 tablet by mouth every 4 to 6 hours as needed for Pain.       Start Taking               gabapentin 300 MG Oral  Cap Take 3 capsules (900 mg total) by mouth 3 (three) times daily.    HYDROcodone-acetaminophen 5-325 MG Oral Tab Take 1 tablet by mouth every 4 to 6 hours as needed for Pain.          Stop Taking                gabapentin 800 MG Oral Tab    Take 1 tablet (800 mg total) by mouth 3 (three) times daily.     traMADol 50 MG Oral Tab    Please take 1 to 2 tablets every 4 hours as needed for pain.            Health Maintenance:  Health Maintenance Due   Topic Date Due    Colorectal Cancer Screening  Never done    Mammogram  Never done    Pneumococcal Vaccine: 50+ Years (1 of 1 - PCV) Never done    Zoster Vaccines (2 of 3) 01/12/2016    DEXA Scan  Never done    COVID-19 Vaccine (1 - 2024-25 season) Never done    Influenza Vaccine (1) 10/01/2024    Annual Depression Screening  01/01/2025    Fall Risk Screening (Annual)  01/01/2025       Patient/Caregiver Education: There are no barriers to learning. Medical education done.   Outcome: Patient verbalizes understanding. Patient is notified to call with any questions, complications, allergies, or worsening or changing symptoms.  Patient is to call with any side effects or complications from the treatments as a result of today.     Problem List:  Patient Active Problem List   Diagnosis    Ex-smoker    Depression    Obese    Dysthymic disorder    Hypersomnia, persistent    Lumbar radiculitis    Encounter for therapeutic drug monitoring    Osteoarthritis of multiple joints    Prediabetes       Return in about 3 months (around 4/17/2025) for medication follow up, or sooner if needed.      Isaura Nunn MD  [facility]  01/17/25      Please note that portions of this note may have been completed with a voice recognition program. Efforts were made to edit the dictations but occasionally words are mis-transcribed. Thank you for your understanding.    The 21st Century Cures Act makes medical notes like these available to patients in the interest of  transparency. Please be advised this is a medical document. Medical documents are intended to carry relevant information, facts as evident, and the clinical opinion of the practitioner. The medical note is intended as peer to peer communication and may appear blunt or direct. It is written in medical language and may contain abbreviations or verbiage that are unfamiliar. If there are any questions or concerns please contact the provider for clarification.              [1] No Known Allergies

## 2025-01-17 NOTE — PROGRESS NOTES
Diagnosis:   Lumbar spine stenosis         Referring Provider: Blaze Guido  Date of Evaluation:    1/7/2025    Precautions:   Obesity Next MD visit:   2/11/2025  Date of Surgery: n/a   Insurance Primary/Secondary: MEDICARE / [employer]     # Auth Visits: [id number]            Subjective: Pt notes her hips were sore for a couple days after therapy. They were better yesterday.  Pain: 7/10       Objective:   Pain at end range hip flexion PROM in ant hip R>L  R glut med: 3/5    Assessment: Continued hip and core strengthening to continue toward her goals.  Pt denied pain with activities today.  Educated pt on delayed muscle soreness and to continue activities to promote blood flow and reduce soreness.    Goals:    (to be met in 8 visits)   Pt will be able to perform 10 mins on Nu step for general conditioning  Increase hip and knee muscle strength to 3/5 to enable pt to stand with support for 5-10 mins  Increase core strength to enable pt to perform all transional movements without increased pain in LE's  Pt will be able to walk 500 feet using RW    Plan: Assess response to distraction and new exercises performed today. Continue PT for strengthening of abdominal and LE muscles, general conditioning  Date: 1/9/2025  TX#: 2/8 Date:1/13/2025                TX#: 3/8 Date: 1/17/2025            TX#: 4/8 Date:                 TX#: 5/ Date:   Tx#: 6/   Seated:  Ankle DF B x 15  LAQ R/L x 10  March hip flexion R/L x 10  Add squeeze with ball x 10 Nu step level 3 8 mins (seat @ 7)  Supine with heels on SB lumbar flexion rolls x 20  Supine with legs resting on SB knee ext R/L x 10  LTR x 10  Single leg distraction R/L 1 min hold Nu step level 3 6 mins (seat @ 7)  Supine with heels on SB lumbar flexion rolls x 20  single leg distraction R/L 1 min hold     Hook lying AAROM hip and knee flex/ext R/L x 10  Hook lying clam with red t band x 15  Isometric abd bracing with head lift x 10  Isometric oblique with hands together in midline PT providing  resistance R/L x 10 Hook lying:  Add squeeze with ball x 10  Bent leg fallout red t band R/L x 10  Bent leg lift with assist R/L x 10  Level 1 curl up press elbows into tx table and lift head x 10     Hook lying:  Add squeeze with ball x 10, hold 5 sec  Bent leg fallout red t band R/L x 10  Hip flex iso with PT providing resistance, x10  Rhythmic stabilization, x10       S lying:  Clam R/L with min assist x 10  Hip abd with min assist R/L x 10 S lying: R/L  Single leg distraction Gr 3 10 sec hold x 6  Assisted hip abd x 10  Open book stretch x 10 S lying: R/L  Clam R/L ankles together, 5 sec hold, x10  Hip ext isometric PT provide resistance, R/L x10     Walk 40 feet x 2 using 2 st canes with supervision  Nu step level 3 5 mins Seated lumbar flexion stretch x 3  LAQ R/L x 10 LAQ R/L x 10  SB roll out into flex, in sitting, x5     HEP: Seated: LAQ, march, supine: heel slides, bent leg fallout, bent leg march  Level 1 curl up with head lift, seated lumbar flexion, abd bracing with PPT    Charges: EX 3       Total Timed Treatment: There ex 40 min, Manual 4 min  Total Treatment Time: 44 min

## 2025-01-19 RX ORDER — HYDROCODONE BITARTRATE AND ACETAMINOPHEN 5; 325 MG/1; MG/1
1 TABLET ORAL
Qty: 60 TABLET | Refills: 0 | Status: SHIPPED | OUTPATIENT
Start: 2025-01-19 | End: 2025-01-27

## 2025-01-21 ENCOUNTER — OFFICE VISIT (OUTPATIENT)
Dept: PHYSICAL THERAPY | Age: 70
End: 2025-01-21
Attending: ANESTHESIOLOGY
Payer: MEDICARE

## 2025-01-21 PROCEDURE — 97110 THERAPEUTIC EXERCISES: CPT

## 2025-01-21 NOTE — PROGRESS NOTES
[facility] Hematology/Oncology  PROGRESS NOTE -  Follow-up Visit      Subjective:       Patient ID:   NAME: Helder Brand : 1959     64 y.o. male    Referring Doc: Sierra  Other Physicians: Chapito Raza (CTS)    Chief Complaint:  PAD/clot workup    History of Present Illness:     Patient returns today for a regularly scheduled follow-up visit.  The patient is here today to go over the results of the recently ordered labs, tests and studies. He is here by himself.     He has been having some pulmonary congestion over the past couple of months and has also had some HBP issues. He was treated with z-pack and pred pack.     He saw Robert with pulmonary in Dec 2023 and sees her again on 3/28    He saw Maxi Atkinson on 2023    He had recurrent LLE DVT while off eliquis in preparation for colonoscopy with Dr Call and transurethral resection of the prostate with Dr Tyler    He had colonsocopy on 10/20;2023 with just few polyps removed. He had TURP on 2023    He was hospitalized in Dec 2023 and had thrombectomy with Dr Esquivel    He has since been back on eliquis and is doing ok    He is on eliquis po bid and vitamins.  No excessive bleeding or bruising.      Breathing ok, no CP, HA's or N/V          Discussed covid precautions - he had covid on 2022 - he has been vaccinated            ROS:   GEN: normal without any fever, night sweats or weight loss;  balance issues better overall  HEENT: normal with no HA's, sore throat, stiff neck, changes in vision  CV: normal with no CP, SOB, PND, LAW or orthopnea  PULM: normal with no SOB, cough, hemoptysis, sputum or pleuritic pain  GI: normal with no abdominal pain, nausea, vomiting, constipation, diarrhea, melanotic stools, BRBPR, or hematemesis  : normal with no hematuria, dysuria  BREAST: normal with no mass, discharge, pain  SKIN: normal with no rash, erythema, bruising, or swelling    Pain Scale: 0    Allergies:  Review of patient's allergies  Diagnosis:   Lumbar spine stenosis         Referring Provider: Blaze Guido  Date of Evaluation:    1/7/2025    Precautions:   Obesity Next MD visit:   2/11/2025  Date of Surgery: n/a   Insurance Primary/Secondary: MEDICARE / [employer]     # Auth Visits: 8            Subjective:  Pt notes her hips have been bothering her.  She can't tell if she is feeling better with PT.  She feels okay during therapy but then has increased soreness the next day.  Pain: 7/10       Objective:   Pain at end range hip flexion PROM in ant hip R>L  R glut med: 3/5, L glut med: 3+/5  TrA brace 40-50 mmHg with 2 sec hold, released TrA with LE movement    Assessment: Progress TrA strengthening for core stabilization.  Pt notes due to pain, she is limited to her recliner.  Educated pt on lumbar lordosis support while sitting and adjusting positions throughout the day to avoid axial pressure.    Goals:    (to be met in 8 visits)   Pt will be able to perform 10 mins on Nu step for general conditioning  Increase hip and knee muscle strength to 3/5 to enable pt to stand with support for 5-10 mins  Increase core strength to enable pt to perform all transional movements without increased pain in LE's  Pt will be able to walk 500 feet using RW    Plan: Assess response to distraction and new exercises performed today. Continue PT for strengthening of abdominal and LE muscles, general conditioning  Date: 1/9/2025  TX#: 2/8 Date:1/13/2025                TX#: 3/8 Date: 1/17/2025            TX#: 4/8 Date: 1/21/2025                TX#: 5/8 Date:   Tx#: 6/   Seated:  Ankle DF B x 15  LAQ R/L x 10  March hip flexion R/L x 10  Add squeeze with ball x 10 Nu step level 3 8 mins (seat @ 7)  Supine with heels on SB lumbar flexion rolls x 20  Supine with legs resting on SB knee ext R/L x 10  LTR x 10  Single leg distraction R/L 1 min hold Nu step level 3 6 mins (seat @ 7)  Supine with heels on SB lumbar flexion rolls x 20  single leg distraction R/L 1 min hold Nu  indicates:  No Known Allergies    Medications:    Current Outpatient Medications:     albuterol (PROVENTIL/VENTOLIN HFA) 90 mcg/actuation inhaler, Inhale 2 puffs into the lungs every 6 (six) hours as needed for Shortness of Breath., Disp: , Rfl:     albuterol (VENTOLIN HFA) 90 mcg/actuation inhaler, Inhale 2 puffs into the lungs every 4 (four) hours as needed for Wheezing or Shortness of Breath. Rescue, Disp: 18 g, Rfl: 11    albuterol-ipratropium (DUO-NEB) 2.5 mg-0.5 mg/3 mL nebulizer solution, Take 3 mLs by nebulization every 6 (six) hours as needed for Wheezing or Shortness of Breath. Rescue, Disp: 240 mL, Rfl: 5    apixaban (ELIQUIS) 2.5 mg Tab, Take by mouth 2 (two) times daily., Disp: , Rfl:     budesonide-glycopyr-formoterol (BREZTRI AEROSPHERE) 160-9-4.8 mcg/actuation HFAA, Inhale 2 puffs into the lungs 2 (two) times a day. (Patient taking differently: Inhale 2 puffs into the lungs 2 (two) times daily as needed.), Disp: 10.7 g, Rfl: 0    cyanocobalamin/folic ac/vit B6 (FOLBIC ORAL), Take 1 tablet by mouth once daily., Disp: , Rfl:     fluticasone propionate (FLONASE) 50 mcg/actuation nasal spray, 2 sprays by Each Nostril route daily as needed for Allergies., Disp: , Rfl:     FOLBIC 2.5-25-2 mg Tab, Take 1 tablet by mouth once daily, Disp: 30 tablet, Rfl: 0    labetaloL (NORMODYNE) 300 MG tablet, Take 1 tablet (300 mg total) by mouth 2 (two) times a day. (Patient taking differently: Take 300 mg by mouth 2 (two) times a day. Taking 1/2 bid), Disp: 180 tablet, Rfl: 1    olmesartan (BENICAR) 40 MG tablet, Take 1 tablet (40 mg total) by mouth every morning. (Patient taking differently: Take 40 mg by mouth every morning. 1/2 bid), Disp: 90 tablet, Rfl: 1    rosuvastatin (CRESTOR) 10 MG tablet, Take 1 tablet (10 mg total) by mouth once daily., Disp: 90 tablet, Rfl: 1    azithromycin (Z-ALONDRA) 250 MG tablet, 2 pills day one, then 1 pill for 4 days (Patient not taking: Reported on 2/28/2024), Disp: 6 tablet, Rfl: 0     step level 3 6 mins (seat @ 7)  single leg distraction R/L 1 min, intermittent  Lateral belt traction, R/L, Gr II-III, 2 min        Hook lying AAROM hip and knee flex/ext R/L x 10  Hook lying clam with red t band x 15  Isometric abd bracing with head lift x 10  Isometric oblique with hands together in midline PT providing resistance R/L x 10 Hook lying:  Add squeeze with ball x 10  Bent leg fallout red t band R/L x 10  Bent leg lift with assist R/L x 10  Level 1 curl up press elbows into tx table and lift head x 10     Hook lying:  Add squeeze with ball x 10, hold 5 sec  Bent leg fallout red t band R/L x 10  Hip flex iso with PT providing resistance, x10  Rhythmic stabilization, x10   Hook lying:  TrA brace with biofeedback, x10  Leg fall out, x10 with TrA biofeedback.  supine with heels on SB lumbar flexion rolls x 20    S lying:  Clam R/L with min assist x 10  Hip abd with min assist R/L x 10 S lying: R/L  Single leg distraction Gr 3 10 sec hold x 6  Assisted hip abd x 10  Open book stretch x 10 S lying: R/L  Clam R/L ankles together, 5 sec hold, x10  Hip ext isometric PT provide resistance, R/L x10 S lying: R/L  Clam R/L ankles together, 5 sec hold, x10  Hip ext isometric PT provide resistance, R/L x10    Walk 40 feet x 2 using 2 st canes with supervision  Nu step level 3 5 mins Seated lumbar flexion stretch x 3  LAQ R/L x 10 LAQ R/L x 10  SB roll out into flex, in sitting, x5 Seated   TrA on chair x10  TrA with add on chair, x10  TrA march, x10, R/L  Trunk rotation, R/L, x10 green    HEP: Seated: LAQ, march, supine: heel slides, bent leg fallout, bent leg march  Level 1 curl up with head lift, seated lumbar flexion, abd bracing with PPT    Charges: EX 3       Total Timed Treatment: There ex 40 min, Manual 5 min  Total Treatment Time: 45 min    predniSONE (DELTASONE) 20 MG tablet, 3 pills for 3 days, 2 for 3 days, 1 for 3 days. Repeat for cough (Patient not taking: Reported on 2/28/2024), Disp: 36 tablet, Rfl: 0  No current facility-administered medications for this visit.    Facility-Administered Medications Ordered in Other Visits:     lactated ringers infusion, , Intravenous, Continuous, Jasbir Aragon MD, Last Rate: 75 mL/hr at 11/07/19 1333, 1,000 mL at 11/07/19 1333    PMHx/PSHx Updates:  See patient's last visit with me on 8/28/2023  See H&P on 8/7/2022        Pathology:   Cancer Staging   No matching staging information was found for the patient.          Objective:     Vitals:  Blood pressure 131/84, pulse 65, temperature 97.7 °F (36.5 °C), resp. rate 18, height 6' (1.829 m), weight 86 kg (189 lb 8 oz).    Physical Examination:   GEN: no apparent distress, comfortable; AAOx3  HEAD: atraumatic and normocephalic  EYES: no pallor, no icterus, PERRLA  ENT: OMM, no pharyngeal erythema, external ears WNL; no nasal discharge; no thrush  NECK: no masses, thyroid normal, trachea midline, no LAD/LN's, supple  CV: RRR with no murmur; normal pulse; normal S1 and S2; no pedal edema  CHEST: Normal respiratory effort; CTAB; normal breath sounds; no wheeze or crackles  ABDOM: nontender and nondistended; soft; normal bowel sounds; no rebound/guarding  MUSC/Skeletal: ROM normal; no crepitus; joints normal; no deformities or arthropathy  EXTREM: no clubbing, cyanosis, inflammation or swelling  SKIN: no rashes, lesions, ulcers, petechiae or subcutaneous nodules  : no dunbar  NEURO: grossly intact; motor/sensory WNL; AAOx3; no tremors  PSYCH: normal mood, affect and behavior  LYMPH: normal cervical, supraclavicular, axillary and groin LN's            Labs:     Lab Results   Component Value Date    WBC 9.90 12/03/2023    HGB 13.3 (L) 12/03/2023    HCT 39.0 (L) 12/03/2023    MCV 93 12/03/2023     12/03/2023       CMP  Sodium   Date Value Ref Range Status    12/03/2023 136 136 - 145 mmol/L Final     Potassium   Date Value Ref Range Status   12/03/2023 4.0 3.5 - 5.1 mmol/L Final     Chloride   Date Value Ref Range Status   12/03/2023 106 95 - 110 mmol/L Final     CO2   Date Value Ref Range Status   12/03/2023 25 23 - 29 mmol/L Final     Glucose   Date Value Ref Range Status   12/03/2023 96 70 - 110 mg/dL Final     BUN   Date Value Ref Range Status   12/03/2023 11 8 - 23 mg/dL Final     Creatinine   Date Value Ref Range Status   12/03/2023 0.8 0.5 - 1.4 mg/dL Final     Calcium   Date Value Ref Range Status   12/03/2023 8.5 (L) 8.7 - 10.5 mg/dL Final     Total Protein   Date Value Ref Range Status   12/03/2023 5.9 (L) 6.0 - 8.4 g/dL Final     Albumin   Date Value Ref Range Status   12/03/2023 3.3 (L) 3.5 - 5.2 g/dL Final     Total Bilirubin   Date Value Ref Range Status   12/03/2023 0.9 0.1 - 1.0 mg/dL Final     Comment:     For infants and newborns, interpretation of results should be based  on gestational age, weight and in agreement with clinical  observations.    Premature Infant recommended reference ranges:  Up to 24 hours.............<8.0 mg/dL  Up to 48 hours............<12.0 mg/dL  3-5 days..................<15.0 mg/dL  6-29 days.................<15.0 mg/dL       Alkaline Phosphatase   Date Value Ref Range Status   12/03/2023 68 55 - 135 U/L Final     AST   Date Value Ref Range Status   12/03/2023 16 10 - 40 U/L Final     ALT   Date Value Ref Range Status   12/03/2023 9 (L) 10 - 44 U/L Final     Anion Gap   Date Value Ref Range Status   12/03/2023 5 (L) 8 - 16 mmol/L Final     eGFR if    Date Value Ref Range Status   03/09/2022 >60 >60 mL/min/1.73 m^2 Final     eGFR if non    Date Value Ref Range Status   03/09/2022 >60 >60 mL/min/1.73 m^2 Final     Comment:     Calculation used to obtain the estimated glomerular filtration  rate (eGFR) is the CKD-EPI equation.             Fibrinogen 194 - 545 mg/dL 229       PT 11.4 - 13.7 sec  15.1 High   14.4 High   13.7  13.5 R    INR   1.3  1.2 CM  1.1 CM  1.1 CM       aPTT 23.3 - 35.1 sec 146.3 High Panic   30.9      Lupus Reflex Interpretation  Comment:    Comment: No lupus anticoagulant was detected. Mixing studies suggest the presence of an inhibitor         MTHFR Comment    Comment: Result:   c.665C>T (p. [id number]), legacy name:   C677T - Detected, heterozygous c.1286A>C (p.   [id number]), legacy name: [id number] - Not      Homocysteine 0.0 - 17.2 umol/L 9.6        Protein S Ag, Total 60 - 150 % 106    Comment: This test was developed and its performance   characteristics determined by [employer]. It   has not been cleared or approved   by the Food and Drug Administration.    Protein S Ag, Free 61 - 136 % 85      Prothrombin Mutation Comment    Comment: Result: c.*97G>A - Not Detected      Anticardiolipin IgG 0 - 14 GPL U/mL <9      Anticardiolipin IgA 0 - 11 APL U/mL <9     Anticardiolipin IgM 0 - 12 MPL U/mL 9        Radiology/Diagnostic Studies:    CTA Chest Non-Coronary - PE Study    Result Date: 8/13/2022  CT angiogram chest with contrast on 8/13/2022 CLINICAL INDICATION: Chest pain TECHNIQUE: Multiple axial images are obtained throughout the chest following the administration of IV contrast.  Computer generated 3D reconstructions/MIPS were performed. This exam was performed according to our departmental dose-optimization program, which includes automated exposure control, adjustment of the mA and/or kV according to patient size and/or use of iterative reconstruction technique. Total DLP is 387.1 mGycm. COMPARISON: 1/3/2022 FINDINGS: There is no thoracic aortic aneurysm or dissection. There is a small left hepatic cyst. Limited visualized upper abdomen is otherwise unremarkable. There is no pleural or pericardial effusion. There is no thoracic adenopathy. There are not no filling defects in the pulmonary arteries to suggest pulmonary embolus. There is minimal bilateral dependent and basilar  atelectasis. The lungs are otherwise clear. No acute bony abnormality is noted. IMPRESSION: 1.  No evidence of pulmonary embolus. 2.  No acute abnormality. Electronically signed by:  Jasbir Chapa  8/13/2022 3:56 AM CDT Workstation: [id number]    US Lower Extremity Arteries Left    Result Date: 8/5/2022   ADDENDUM #1 THIS REPORT CONTAINS FINDINGS THAT MAY BE CRITICAL TO PATIENT CARE:  Called, telephoned, verbal report was given oral to DR. VELMA AVALOS at 2:57 AM CDT on 8/5/2022. Electronically signed by:  Piotr Cole MD  8/5/2022 4:43 AM CDT Workstation: [id number]  ORIGINAL REPORT EXAM DESCRIPTION: US LOWER EXTREMITY ARTERIES LEFT 8/5/2022 2:49 AM CDT CLINICAL HISTORY: 63 years, Male, COMPARISON: None FINDINGS: Multiple grayscale images and duplex Doppler ultrasound of the left arterial system was performed with color flow, spectral waveform and peak systolic velocities. Left common femoral artery peak systolic velocity of 29 cm/sec. triphasic waveform Left profunda peak systolic velocity of 38 cm/sec. triphasic waveform Left superficial femoral artery proximal peak systolic velocity 19 cm/sec. triphasic waveform. Left superficial femoral artery mid aspect demonstrate a filling defect with abnormal waveform and peak systolic velocity of 19 cm/s. Left superficial femoral artery distally demonstrate filling defect with a markedly decreased velocity of 12 cm/s. Left popliteal artery demonstrate filling defect with abnormal decreased velocity of 19 cm/s Left posterior tibial artery demonstrated filling defect with lack of flow. Left anterior tibial artery demonstrate filling defect within the lack of flow Left peroneal artery demonstrate filling defect with lack of flow Left dorsalis pedis artery demonstrate filling defect with lack of flow. IMPRESSION: Extensive left lower extremity arterial thrombus from mid superficial femoral artery downward. Electronically signed by:  Piotr Cole MD  8/5/2022 2:54 AM CDT  Workstation: [id number]    US Lower Extremity Veins Left    Result Date: 8/5/2022   ADDENDUM #1 Findings were discussed with Dr. Alfa Keene on 8/5/2022 at 3:21 AM Central standard time via telephone conference. Lesion described in the left popliteal fossa corresponds to patient's known previously repaired popliteal artery aneurysm. Further evaluation with CTA of the left lower extremity will be obtained. Electronically signed by:  Jordyn Shah MD  8/5/2022 3:45 AM CDT Workstation: [id number]  ORIGINAL REPORT EXAM: US Duplex Left Lower Extremity Veins CLINICAL HISTORY: The patient is 63 years old and is Male; TECHNIQUE: Real-time duplex ultrasound scan of the left lower extremity veins integrating B-mode two-dimensional vascular structure, Doppler spectral analysis, color flow Doppler imaging and compression. COMPARISON: No relevant prior studies available. FINDINGS: DEEP VEINS: No DVT in the visualized common femoral, femoral, proximal deep femoral or popliteal veins.  The veins demonstrate normal color flow, are normally compressible, with normal phasic flow and/or augmentation response. SUPERFICIAL VEINS:  Unremarkable.  No thrombus in the visualized great saphenous vein. SOFT TISSUES:  Large heterogeneous lesion in the left popliteal fossa measuring 7.1 x 3.3 x 3.9 cm of indeterminate etiology. IMPRESSION: 1.  No evidence of deep venous thrombosis. 2.  Large heterogeneous lesion in the left popliteal fossa measuring 7.1 x 3.3 x 3.9 cm of indeterminate etiology.  Recommend further evaluation with cross-sectional imaging. Electronically signed by:  Jordyn Shah MD  8/5/2022 3:18 AM [employer] Workstation: [id number]    CTA Lower Extremity Bilateral    Result Date: 8/5/2022  EXAM: CT Angiography of the Bilateral Lower Extremities With Intravenous Contrast CLINICAL HISTORY: The patient is 63 years old and is Male; Arterial embolism, lower extremity TECHNIQUE: Axial computed tomographic angiography images of  the bilateral lower extremities with intravenous contrast.  Sagittal and coronal reformatted images were created and reviewed.  This CT exam was performed using one or more of the following dose reduction techniques:  automated exposure control, adjustment of the mA and/or kV according to patient size, and/or use of iterative reconstruction technique. MIP reconstructed images were created and reviewed. COMPARISON: No relevant prior studies available. FINDINGS: VASCULATURE: RIGHT FEMORAL/POPLITEAL ARTERIES:  No acute findings.  No occlusion or significant stenosis. RIGHT CALF/FOOT ARTERIES:  The right posterior tibial artery is dominant and visualized into the hindfoot. The right peroneal and anterior tibial arteries contrast tapers gradually faded and are not visualized in the mid to distal lower extremity.  No occlusion or significant stenosis. LEFT FEMORAL/POPLITEAL ARTERIES:  Complete occlusion from the left proximal superficial femoral artery into all 3 infrapopliteal arteries.  Left popliteal artery aneurysm measures 4.2 x 3.5 x 8.1 cm.  No filling defects in the visualized left deep femoral artery. LEFT CALF/FOOT ARTERIES: No opacification. LOWER EXTREMITIES: BONES/JOINTS:  No acute fracture.  No dislocation. SOFT TISSUES:  Left popliteal/Baker's cyst. REPRODUCTIVE:  Fiducial markers in the prostate. IMPRESSION: 1.  Complete occlusion from the left proximal superficial femoral artery into all 3 infrapopliteal arteries. 2.  Left popliteal artery aneurysm measures 4.2 x 3.5 x 8.1 cm. 3.  Left popliteal/Baker's cyst. Electronically signed by:  Alvarado Tejada MD  8/5/2022 6:00 AM CDT Workstation: [id number]      I have reviewed all available lab results and radiology reports.    Assessment/Plan:   (1) 64 y.o. male with diagnosis of severe recurrent PAD who presented to the ER at [facility] with severe left leg pain. He had prior left popliteal aneurysm repair with Dr daniel in July 2021. Doppler studies in ED showed  thrombosis of the mid-superficial femoral artery. He underwent angiography yesterday on 8/6 with thrombectomy and TPA with Dr Esquivel. He is currently in the ICU and is awake and alert. He denies any current pain in the left leg. No current CP, SOB, HA's or N/V. He is a [employer] and is absolutely against receiving any blood products or transfusions even at the risk of his own life. I discussed with his ICU nurse Mima and Dr Esquivel both in person.      8/7/2022: seen as consult in-hospital  - hematology consulted for evaluation for any underlying clot disorders  - vascular plans to start him on either eliquis of xarelto    8/29/2022:  - he is doing ok post-hospitalization  - he saw Lacho and had repeat studies which were good per patient  - clot workup essentially WNL except for MTHFR-C gene heterozygous positive but the homocysteine was WNL  - discussed the genetic implications in children, siblings, etc  - add folbic  - continue eliquis as per the directions of Dr Esquivel  - LA was negative but he was on heparinoid products at time of test - thus the presence of an inhibitor was detected    2/27/2023:  - he is doing ok with no new issues  - continued on eliquis  - he sees Dr Esquivel every 6 months  - no excessive bleeding or bruising    8/28/2023:  - continued on eliquis  - no excessive bleeding or bruising  - Sierra is no longer on his insurance  - He saw Dr Tyler in July 2023 and they are planning prostate biopsy with elevated PSA; he saw Robert in July 2023; Dr Llamas in June 2023; he saw Riley Atkinson in May 2023    2/28/2024:  - continued on eliquis  - no excessive bleeding or bruising  - he had recurrent DVT in Dec 2023 most likely due to the fact that he was off the eliquis for a period of time for the TURP and colonoscopy  - he had thrombectomy with Dr Esquivel in Dec 2023     (2) HTN and hypercholesterolemia     (3) Hx/of kidney stones     (4)  Thyroid disease s/p partial thyroidectomy     (5)  "BPH     (6) COPD     (7) Mild anemia - [facility] parameters       (8) Mild borderline thrombocytopenia - resolved    (9) Mild leucocytosis - resolved - suspect secondary to the use of steroids    (10) Elevated PSA -followed by Dr Tyler    (11) COPD/"Runner's Lungs" - followed by Robert and treated with steroids and periodically on abx       VISIT DIAGNOSES:      Heterozygous MTHFR mutation [id number]    Femoral popliteal artery thrombus    Acute embolism and thrombosis of left popliteal vein          PLAN:  Continued on eliquis and folbic  2. F/u with pulmonary (March 28th 2024)  3. Previously discussed the genetic implications of the MTHFR gene abnormality in children and siblings, etc  4. F/u with PCP, Vascular, Pulm, Card etc   5. F/u with  and GI  RTC in 6 months    Fax note to Demetrio Esquivel Matthew J., NAKUL, Robert Tyler; Oneyda Bundy    Discussion:     COVID-19 Discussion:    I had long discussion with patient and any applicable family about the COVID-19 coronavirus epidemic and the recommended precautions with regard to cancer and/or hematology patients. I have re-iterated the CDC recommendations for adequate hand washing, use of hand -like products, and coughing into elbow, etc. In addition, especially for our patients who are on chemotherapy and/or our otherwise immunocompromised patients, I have recommended avoidance of crowds, including movie theaters, restaurants, churches, etc. I have recommended avoidance of any sick or symptomatic family members and/or friends. I have also recommended avoidance of any raw and unwashed food products, and general avoidance of food items that have not been prepared by themselves. The patient has been asked to call us immediately with any symptom developments, issues, questions or other general concerns.       Anticoagulation Discussion:    Discussed with patient and any applicable family members about the benefit and/or need for anticoagulation. I communicated " about the risks of bleeding while on any anticoagulation, which could be serious and/or life-threatening, and which can occur at any time, regardless of degree of the level of anticoagulation. I expressed the need for compliance with any anticoagulation regimen and that failure to do so could potential lead to excessive bleeding, and risk to health and/or life. In particular, with patients on coumadin therapy, compliance with requested blood work is absolutely essential, as coumadin levels can vary from time to time, and failure to do so could potentially place the patient at risk for bleeding and/or clotting events which could be fatal. Patients on coumadin are encouraged to call the day after they have their levels drawn, as to obtain the appropriate instructions from my staff. Patients are aware that self-regulating or self-dosing of their medications is strictly prohibited.       I spent over 25 mins of time with the patient. Reviewed results of the recently ordered labs, tests and studies; made directives with regards to the results. Over half of this time was spent couseling and coordinating care.    I have explained all of the above in detail and the patient understands all of the current recommendation(s). I have answered all of their questions to the best of my ability and to their complete satisfaction.   The patient is to continue with the current management plan.            Electronically signed by Stefano Pichardo MD             Answers submitted by the patient for this visit:  Review of Systems Questionnaire (Submitted on 2/25/2024)  appetite change : Yes  unexpected weight change: No  mouth sores: No  visual disturbance: Yes  cough: Yes  shortness of breath: Yes  chest pain: Yes  abdominal pain: No  diarrhea: No  frequency: Yes  back pain: No  rash: No  headaches: No  adenopathy: No  nervous/ anxious: No

## 2025-01-22 ENCOUNTER — PATIENT MESSAGE (OUTPATIENT)
Dept: ORTHOPEDICS CLINIC | Facility: CLINIC | Age: 70
End: 2025-01-22

## 2025-01-22 DIAGNOSIS — M16.0 PRIMARY OSTEOARTHRITIS OF BOTH HIPS: ICD-10-CM

## 2025-01-22 DIAGNOSIS — M17.12 PRIMARY OSTEOARTHRITIS OF LEFT KNEE: ICD-10-CM

## 2025-01-22 DIAGNOSIS — M54.16 LUMBAR RADICULOPATHY: ICD-10-CM

## 2025-01-22 DIAGNOSIS — M25.552 LEFT HIP PAIN: Primary | ICD-10-CM

## 2025-01-22 DIAGNOSIS — M17.11 PRIMARY OSTEOARTHRITIS OF RIGHT KNEE: ICD-10-CM

## 2025-01-22 NOTE — TELEPHONE ENCOUNTER
Unfortunately too soon for hip CSI. She is welcome to see a surgeon for opinion but her BMI at this time precludes her from definitive options in my opinion. She is a bariatric surgery candidate and already working with weight management for consideration of more aggressive weight loss measures. She is also established with pain management for consideration of alternative pain management strategies. Would defer to Dr. Guido but in patients with suspected degenerative pain generators that are not surgical candidates can consider alternatives like RFA. I would encourage her to touch base with these providers as well as she is not responding well at all to the measures I have provided including extensive PO analgesics and injections.

## 2025-01-22 NOTE — TELEPHONE ENCOUNTER
Complaint - left hip with \"intense pain\". Pt asking if should follow up with Dr Ramirez or be referred to surgeon.    LOV for RIGHT HIP 11/20/24 - has been seen by both Dr Ramirez and Dr Martinez for other joints since.    Last CSI - 11/20/24, bilat hips  Some relief provided by CSI, less than in past. Effectiveness worn off at/before 2 months.    Reports intense pain, \"feels like hip dislocated\" but does not think it has.  Instructions to report immediately to ER if feels joint dislocated.  Left groin pain that radiates down leg with any movement.  Cannot climb stairs without great difficulty.  \"Extremely difficult to raise to standing position.\"  Painful to stand, walk. \"I can barely move.\"      5 sessions in for PT - lumbar spine.   Thinks stretch possibly irritated hip.    Known advanced OA of bilat hips. R>L    Possible complicating factors: Lumbar xray 1/6/25:  \"There are 5 lumbar-type vertebral bodies which maintain normal height.  Bilateral L5 pars defects with stable anterolisthesis of L5 on S1 and advanced degenerative disc disease at this level.  Remaining discs are preserved with small multilevel endplate   osteophytes.  No destructive osseous lesions.\"

## 2025-01-23 ENCOUNTER — APPOINTMENT (OUTPATIENT)
Dept: PHYSICAL THERAPY | Age: 70
End: 2025-01-23
Attending: ANESTHESIOLOGY
Payer: MEDICARE

## 2025-01-23 ENCOUNTER — PATIENT MESSAGE (OUTPATIENT)
Dept: FAMILY MEDICINE CLINIC | Facility: CLINIC | Age: 70
End: 2025-01-23

## 2025-01-23 ENCOUNTER — TELEPHONE (OUTPATIENT)
Dept: PHYSICAL THERAPY | Age: 70
End: 2025-01-23

## 2025-01-23 NOTE — TELEPHONE ENCOUNTER
Spoke with patient regarding PT POC.  Pt notes her hips and knees have increased soreness, and PT seemed to be worsening her LE symptoms during LBP treatment.  Offered information on aquatic PT referral or medical fitness referral as option for low WB strengthening in pool.  She will keep this in mind.  Pt will f/u with MD and discontinue PT at this time.

## 2025-01-24 NOTE — TELEPHONE ENCOUNTER
I am sorry to hear about the pain.  Other pain specialists in the area:  Dr. Barber - Denita Quinonez - [place]

## 2025-01-24 NOTE — TELEPHONE ENCOUNTER
Dr. Quinonez is with [facility].  Dr. Barber is nearby.  Would have to check with insurance for other options. Thank you.

## 2025-01-27 ENCOUNTER — OFFICE VISIT (OUTPATIENT)
Dept: PAIN CLINIC | Facility: CLINIC | Age: 70
End: 2025-01-27
Payer: MEDICARE

## 2025-01-27 VITALS — HEART RATE: 64 BPM | OXYGEN SATURATION: 93 % | DIASTOLIC BLOOD PRESSURE: 70 MMHG | SYSTOLIC BLOOD PRESSURE: 138 MMHG

## 2025-01-27 DIAGNOSIS — M15.0 PRIMARY OSTEOARTHRITIS INVOLVING MULTIPLE JOINTS: Primary | ICD-10-CM

## 2025-01-27 DIAGNOSIS — M16.0 PRIMARY OSTEOARTHRITIS OF BOTH HIPS: ICD-10-CM

## 2025-01-27 DIAGNOSIS — M17.0 PRIMARY OSTEOARTHRITIS OF BOTH KNEES: ICD-10-CM

## 2025-01-27 DIAGNOSIS — M54.16 LUMBAR RADICULITIS: ICD-10-CM

## 2025-01-27 DIAGNOSIS — G89.29 CHRONIC BILATERAL LOW BACK PAIN WITH RIGHT-SIDED SCIATICA: ICD-10-CM

## 2025-01-27 DIAGNOSIS — M25.50 ARTHRALGIA OF MULTIPLE JOINTS: ICD-10-CM

## 2025-01-27 DIAGNOSIS — M54.41 CHRONIC BILATERAL LOW BACK PAIN WITH RIGHT-SIDED SCIATICA: ICD-10-CM

## 2025-01-27 DIAGNOSIS — M25.50 CHRONIC PAIN OF MULTIPLE JOINTS: ICD-10-CM

## 2025-01-27 DIAGNOSIS — G89.29 CHRONIC PAIN OF MULTIPLE JOINTS: ICD-10-CM

## 2025-01-27 PROCEDURE — 99215 OFFICE O/P EST HI 40 MIN: CPT | Performed by: ANESTHESIOLOGY

## 2025-01-27 PROCEDURE — G2211 COMPLEX E/M VISIT ADD ON: HCPCS | Performed by: ANESTHESIOLOGY

## 2025-01-27 RX ORDER — HYDROCODONE BITARTRATE AND ACETAMINOPHEN 5; 325 MG/1; MG/1
1 TABLET ORAL EVERY 6 HOURS PRN
Qty: 56 TABLET | Refills: 0 | Status: SHIPPED | OUTPATIENT
Start: 2025-01-27

## 2025-01-27 NOTE — PROGRESS NOTES
Patient presents in office today with reported pain in left side hip, upper leg and knee    Current pain level reported = 7/10, 10/10 standing    Last reported dose of n/a      Narcotic Contract renewal n/a    Urine Drug screen n/a

## 2025-01-27 NOTE — PROGRESS NOTES
Name: Tejal Franz   : 1955   DOS: 2025     Pain Clinic Follow Up Visit:     Chief Complaint   Patient presents with    Follow - Up     Address hip and leg pain; discuss possible injections       Tejal Franz is a 69 year old female with advanced lumbar degenerative disc disease, pars defect, and central canal neuroforaminal stenosis here for follow-up.  In addition, she also has degenerative changes in both hips and both knees.  Patient reports significant pain upon standing.  This is likely exacerbation of her claudication type symptoms along with loadbearing and arthritic joints.  Since last office visit, patient started Zepbound and has lost 40 pounds.  She has also been evaluated by orthospine who highlighted her weight as a concern for decompressive surgery.  Patient here to request pain medications as she feels that her pain is severe and functionally limiting.  Was prescribed hydrocodone by her PCP but referred here for refill.    Pt denies any chills, fever, or weakness. There is no bladder or bowel incontinence associated with the pain.    REVIEW OF SYSTEMS:  A ten point review of systems was performed with pertinent positives and negatives in the HPI.    Allergies[1]    Current Outpatient Medications   Medication Sig Dispense Refill    HYDROcodone-acetaminophen 5-325 MG Oral Tab Take 1 tablet by mouth every 6 (six) hours as needed for Pain. 56 tablet 0    gabapentin 300 MG Oral Cap Take 3 capsules (900 mg total) by mouth 3 (three) times daily. 270 capsule 2    diclofenac 75 MG Oral Tab EC Take 1 tablet (75 mg total) by mouth 2 (two) times daily. 180 tablet 1    cyclobenzaprine 10 MG Oral Tab Take 1 tablet (10 mg total) by mouth nightly for 20 days. 20 tablet 0    Acetaminophen 500 MG Oral Cap Take 2 capsules (1,000 mg total) by mouth every 6 (six) hours. 240 capsule 2    Tirzepatide-Weight Management (ZEPBOUND) 2.5 MG/0.5ML Subcutaneous Solution Inject 2.5 mg into the skin once a  week. 2 mL 3    escitalopram (LEXAPRO) 20 MG Oral Tab Take 1 tablet (20 mg total) by mouth daily. 90 tablet 1    Calcium Carbonate-Vitamin D 600-400 MG-UNIT Oral Tab Take 1 tablet by mouth daily.      Multiple Vitamins-Minerals (CENTRUM SILVER) Oral Tab Take 1 tablet by mouth daily.           EXAM:   /70 (BP Location: Left arm, Patient Position: Sitting, Cuff Size: large)   Pulse 64   SpO2 93%   General:  Patient is a(n) 69 year old year old female in no acute distress.  Neurologic:: WNL-Orientation to time, place and person, normal mood & affect, concentration & attention span intact.   Inspection: In wheelchair  Neck: Full range of motion  Back: Able to move bilateral lower extremities  Cranial nerve: Grossly intact  Respiratory: Nonlabored    IMAGES:   Lumbar x-ray reviewed with bilateral L5 pars defect with anterior listhesis L5 on S1  Knee x-ray reviewed with medial compartment disease  Hip x-ray reviewed with osteoarthritis      ASSESSMENT AND PLAN:     1. Primary osteoarthritis involving multiple joints    2. Primary osteoarthritis of both hips    3. Primary osteoarthritis of both knees    4. Arthralgia of multiple joints    5. Chronic pain of multiple joints    6. Chronic bilateral low back pain with right-sided sciatica    7. Lumbar radiculitis        The patient is a 69-year-old female with a history of obesity and lumbar spinal stenosis along with arthralgia of multiple joints.  Patient referred to the pain clinic to obtain hydrocodone refill which was prescribed by her PCP.  I had a detailed and candid discussion with the patient that her current hydrocodone usage does not meet pain clinic prescribing guidelines.  Additionally, I do not believe long-term opiate therapy is a good solution for managing her symptoms.  I understand patient's limitations in terms of function due to pain.  However, she has made progress with weight loss.  Encouraged her to continue with weight loss and to follow-up  with spine surgery once her goal weight has been achieved.    Since the patient is here today, I did give her a 2-week course of hydrocodone.  Any further refills will be obtained from her PCP.  Patient voiced understanding.    Pain is  limiting functional status. Failed conservative treatment consisting of medications, activity modification, and  PT.  1.Risks of long term narcotic use d/w pt including dependence, abuse, and death from overdose and mixing narcotic with alcohol. Pt verbalized understanding.     Medications filled today:  Requested Prescriptions     Signed Prescriptions Disp Refills    HYDROcodone-acetaminophen 5-325 MG Oral Tab 56 tablet 0     Sig: Take 1 tablet by mouth every 6 (six) hours as needed for Pain.     Orders:No orders of the defined types were placed in this encounter.        Radiology orders and consultations:None  The patient indicates understanding of these issues and agrees to the plan.  No follow-ups on file.    Blaze Guido MD, 1/27/2025, 11:15 AM              [1] No Known Allergies

## 2025-01-27 NOTE — PATIENT INSTRUCTIONS
Refill policies:    Allow 2-3 business days for refills; controlled substances may take longer.  Contact your pharmacy at least 5 days prior to running out of medication and have them send an electronic request or submit request through the “request refill” option in your [employer] account.  Refills are not addressed on weekends; covering physicians do not authorize routine medications on weekends.  No narcotics or controlled substances are refilled after noon on Fridays or by on call physicians.  By law, narcotics must be electronically prescribed.  A 30 day supply with no refills is the maximum allowed.  If your prescription is due for a refill, you may be due for a follow up appointment.  To best provide you care, patients receiving routine medications need to be seen at least once a year.  Patients receiving narcotic/controlled substance medications need to be seen at least once every 3 months.  In the event that your preferred pharmacy does not have the requested medication in stock (e.g. Backordered), it is your responsibility to find another pharmacy that has the requested medication available.  We will gladly send a new prescription to that pharmacy at your request.    Scheduling Tests:    If your physician has ordered radiology tests such as MRI or CT scans, please contact Central Scheduling at [phone] right away to schedule the test.  Once scheduled, the [facility] Centralized Referral Team will work with your insurance carrier to obtain pre-certification or prior authorization.  Depending on your insurance carrier, approval may take 3-10 days.  It is highly recommended patients assure they have received an authorization before having a test performed.  If test is done without insurance authorization, patient may be responsible for the entire amount billed.      Precertification and Prior Authorizations:  If your physician has recommended that you have a procedure or additional testing performed the [facility]  Centralized Referral Team will contact your insurance carrier to obtain pre-certification or prior authorization.    You are strongly encouraged to contact your insurance carrier to verify that your procedure/test has been approved and is a COVERED benefit.  Although the [facility] Centralized Referral Team does its due diligence, the insurance carrier gives the disclaimer that \"Although the procedure is authorized, this does not guarantee payment.\"    Ultimately the patient is responsible for payment.   Thank you for your understanding in this matter.  Paperwork Completion:  If you require FMLA or disability paperwork for your recovery, please make sure to either drop it off or have it faxed to our office at [phone]. Be sure the form has your name and date of birth on it.  The form will be faxed to our Forms Department and they will complete it for you.  There is a 25$ fee for all forms that need to be filled out.  Please be aware there is a 10-14 day turnaround time.  You will need to sign a release of information (NIKOLAS) form if your paperwork does not come with one.  You may call the Forms Department with any questions at [phone].  Their fax number is [phone].

## 2025-01-28 ENCOUNTER — APPOINTMENT (OUTPATIENT)
Dept: PHYSICAL THERAPY | Age: 70
End: 2025-01-28
Attending: ANESTHESIOLOGY
Payer: MEDICARE

## 2025-01-28 DIAGNOSIS — G89.29 CHRONIC PAIN OF MULTIPLE JOINTS: ICD-10-CM

## 2025-01-28 DIAGNOSIS — M54.41 CHRONIC BILATERAL LOW BACK PAIN WITH RIGHT-SIDED SCIATICA: ICD-10-CM

## 2025-01-28 DIAGNOSIS — M25.50 ARTHRALGIA OF MULTIPLE JOINTS: ICD-10-CM

## 2025-01-28 DIAGNOSIS — M25.50 CHRONIC PAIN OF MULTIPLE JOINTS: ICD-10-CM

## 2025-01-28 DIAGNOSIS — G89.29 CHRONIC BILATERAL LOW BACK PAIN WITH RIGHT-SIDED SCIATICA: ICD-10-CM

## 2025-01-28 DIAGNOSIS — M17.0 PRIMARY OSTEOARTHRITIS OF BOTH KNEES: ICD-10-CM

## 2025-01-28 DIAGNOSIS — M16.0 PRIMARY OSTEOARTHRITIS OF BOTH HIPS: ICD-10-CM

## 2025-01-30 ENCOUNTER — PATIENT MESSAGE (OUTPATIENT)
Dept: INTERNAL MEDICINE CLINIC | Facility: CLINIC | Age: 70
End: 2025-01-30

## 2025-01-30 DIAGNOSIS — Z51.81 ENCOUNTER FOR THERAPEUTIC DRUG MONITORING: ICD-10-CM

## 2025-01-30 DIAGNOSIS — E66.01 CLASS 3 SEVERE OBESITY WITH SERIOUS COMORBIDITY AND BODY MASS INDEX (BMI) OF 50.0 TO 59.9 IN ADULT, UNSPECIFIED OBESITY TYPE (HCC): ICD-10-CM

## 2025-01-30 DIAGNOSIS — E66.813 CLASS 3 SEVERE OBESITY WITH SERIOUS COMORBIDITY AND BODY MASS INDEX (BMI) OF 50.0 TO 59.9 IN ADULT, UNSPECIFIED OBESITY TYPE (HCC): ICD-10-CM

## 2025-01-30 DIAGNOSIS — R73.03 PREDIABETES: ICD-10-CM

## 2025-01-30 DIAGNOSIS — M15.9 OSTEOARTHRITIS OF MULTIPLE JOINTS, UNSPECIFIED OSTEOARTHRITIS TYPE: ICD-10-CM

## 2025-01-30 RX ORDER — DICLOFENAC SODIUM 75 MG/1
75 TABLET, DELAYED RELEASE ORAL 2 TIMES DAILY
Qty: 180 TABLET | Refills: 1 | OUTPATIENT
Start: 2025-01-30

## 2025-01-30 RX ORDER — TIRZEPATIDE 2.5 MG/.5ML
INJECTION, SOLUTION SUBCUTANEOUS
Qty: 2 ML | Refills: 3 | OUTPATIENT
Start: 2025-01-30

## 2025-01-30 NOTE — TELEPHONE ENCOUNTER
Requesting zepbound 2.5 vials  LOV: 10/28/24  RTC: 3 months  Filled: 11/10/24 #2ml with 3 refills    Future Appointments   Date Time Provider Department Center   2/19/2025 12:30 PM Shailesh Valadez APRN [facility] [place]   2/20/2025 10:00 AM Bjorn Ramirez, DO [facility] [place]   3/3/2025  1:15 PM Blaze Guido MD [facility] [place]   3/13/2025  9:40 AM Bjorn Ramirez, DO [facility]   3/20/2025  9:40 AM Bjorn Ramirez, DO [facility]   3/24/2025  2:00 PM Eva Springer APRN [facility] [id number]   3/27/2025  9:40 AM Bjorn Ramirez, DO [facility]   6/23/2025  2:00 PM Eva Springer APRN [facility] [id number]     Too soon for refill

## 2025-01-31 ENCOUNTER — APPOINTMENT (OUTPATIENT)
Dept: PHYSICAL THERAPY | Age: 70
End: 2025-01-31
Attending: ANESTHESIOLOGY
Payer: MEDICARE

## 2025-02-04 ENCOUNTER — PATIENT MESSAGE (OUTPATIENT)
Dept: FAMILY MEDICINE CLINIC | Facility: CLINIC | Age: 70
End: 2025-02-04

## 2025-02-04 ENCOUNTER — PATIENT MESSAGE (OUTPATIENT)
Dept: ORTHOPEDICS CLINIC | Facility: CLINIC | Age: 70
End: 2025-02-04

## 2025-02-04 DIAGNOSIS — M16.0 PRIMARY OSTEOARTHRITIS OF BOTH HIPS: ICD-10-CM

## 2025-02-04 DIAGNOSIS — G89.29 CHRONIC BILATERAL LOW BACK PAIN WITH RIGHT-SIDED SCIATICA: ICD-10-CM

## 2025-02-04 DIAGNOSIS — M17.0 PRIMARY OSTEOARTHRITIS OF BOTH KNEES: ICD-10-CM

## 2025-02-04 DIAGNOSIS — M25.50 CHRONIC PAIN OF MULTIPLE JOINTS: Primary | ICD-10-CM

## 2025-02-04 DIAGNOSIS — M54.41 CHRONIC BILATERAL LOW BACK PAIN WITH RIGHT-SIDED SCIATICA: ICD-10-CM

## 2025-02-04 DIAGNOSIS — M25.50 ARTHRALGIA OF MULTIPLE JOINTS: ICD-10-CM

## 2025-02-04 DIAGNOSIS — G89.29 CHRONIC PAIN OF MULTIPLE JOINTS: Primary | ICD-10-CM

## 2025-02-05 RX ORDER — HYDROCODONE BITARTRATE AND ACETAMINOPHEN 5; 325 MG/1; MG/1
1 TABLET ORAL EVERY 6 HOURS PRN
Qty: 60 TABLET | Refills: 0 | Status: SHIPPED | OUTPATIENT
Start: 2025-02-05

## 2025-02-05 NOTE — TELEPHONE ENCOUNTER
From  1/17/25    BMI 47.82 kg/m²  Estimated body mass index is 47.82 kg/m² as calculated from the following:    Height as of this encounter: 5' 1\" (1.549 m).    Weight as of this encounter: 253 lb 1.4 oz (114.8 kg).

## 2025-02-27 DIAGNOSIS — G89.29 CHRONIC BILATERAL LOW BACK PAIN WITH RIGHT-SIDED SCIATICA: ICD-10-CM

## 2025-02-27 DIAGNOSIS — M25.50 ARTHRALGIA OF MULTIPLE JOINTS: ICD-10-CM

## 2025-02-27 DIAGNOSIS — M25.50 CHRONIC PAIN OF MULTIPLE JOINTS: ICD-10-CM

## 2025-02-27 DIAGNOSIS — M54.41 CHRONIC BILATERAL LOW BACK PAIN WITH RIGHT-SIDED SCIATICA: ICD-10-CM

## 2025-02-27 DIAGNOSIS — G89.29 CHRONIC PAIN OF MULTIPLE JOINTS: ICD-10-CM

## 2025-02-27 DIAGNOSIS — M17.0 PRIMARY OSTEOARTHRITIS OF BOTH KNEES: ICD-10-CM

## 2025-02-27 DIAGNOSIS — M16.0 PRIMARY OSTEOARTHRITIS OF BOTH HIPS: ICD-10-CM

## 2025-02-28 RX ORDER — HYDROCODONE BITARTRATE AND ACETAMINOPHEN 5; 325 MG/1; MG/1
1 TABLET ORAL EVERY 6 HOURS PRN
Qty: 60 TABLET | Refills: 0 | Status: SHIPPED | OUTPATIENT
Start: 2025-02-28

## 2025-02-28 NOTE — TELEPHONE ENCOUNTER
Last Office Visit: 01/17/2025    Last Refill:   Medication Quantity Refills Start End   HYDROcodone-acetaminophen 5-325 MG Oral Tab 60 tablet 0 2/5/2025 --     Return to Clinic: has appt 03/24/2025    Protocol: n/a    Refill pended. Please approve if okay. Thank you.

## 2025-03-01 DIAGNOSIS — M15.9 OSTEOARTHRITIS OF MULTIPLE JOINTS, UNSPECIFIED OSTEOARTHRITIS TYPE: ICD-10-CM

## 2025-03-01 DIAGNOSIS — E66.01 CLASS 3 SEVERE OBESITY WITH SERIOUS COMORBIDITY AND BODY MASS INDEX (BMI) OF 50.0 TO 59.9 IN ADULT, UNSPECIFIED OBESITY TYPE (HCC): ICD-10-CM

## 2025-03-01 DIAGNOSIS — Z51.81 ENCOUNTER FOR THERAPEUTIC DRUG MONITORING: ICD-10-CM

## 2025-03-01 DIAGNOSIS — R73.03 PREDIABETES: ICD-10-CM

## 2025-03-01 DIAGNOSIS — E66.813 CLASS 3 SEVERE OBESITY WITH SERIOUS COMORBIDITY AND BODY MASS INDEX (BMI) OF 50.0 TO 59.9 IN ADULT, UNSPECIFIED OBESITY TYPE (HCC): ICD-10-CM

## 2025-03-03 NOTE — TELEPHONE ENCOUNTER
Requesting zepbound increase?  LOV: 10/28/24  RTC: 3 months  Filled: 11/10/24 #2ml with 3 refills    Future Appointments   Date Time Provider Department Center   3/24/2025  2:00 PM Eva Springer APRN [facility] [id number]   4/4/2025  9:20 AM Yosef Prince MD [facility]   6/23/2025  2:00 PM Eva Springer APRN [facility] [id number]   8/19/2025 12:30 PM Shailesh Valadez APRN [facility] [place]   9/8/2025 10:30 AM Isaura Nunn MD [facility] [id number]     Will run out before appointment

## 2025-03-04 RX ORDER — TIRZEPATIDE 2.5 MG/.5ML
2.5 INJECTION, SOLUTION SUBCUTANEOUS WEEKLY
Qty: 2 ML | Refills: 0 | Status: SHIPPED | OUTPATIENT
Start: 2025-03-04

## 2025-03-05 NOTE — TELEPHONE ENCOUNTER
PT has a question about the refill of zepbound 2.5  \"I may be adding a knee ablation, another hip nerve block, and another hip ablation. Should I stay off of Zepbound until all is finished. That could mean a total of a month or more?\"

## 2025-03-14 DIAGNOSIS — M16.0 PRIMARY OSTEOARTHRITIS OF BOTH HIPS: ICD-10-CM

## 2025-03-14 DIAGNOSIS — G89.29 CHRONIC PAIN OF MULTIPLE JOINTS: ICD-10-CM

## 2025-03-14 DIAGNOSIS — M25.50 ARTHRALGIA OF MULTIPLE JOINTS: ICD-10-CM

## 2025-03-14 DIAGNOSIS — M17.0 PRIMARY OSTEOARTHRITIS OF BOTH KNEES: ICD-10-CM

## 2025-03-14 DIAGNOSIS — G89.29 CHRONIC BILATERAL LOW BACK PAIN WITH RIGHT-SIDED SCIATICA: ICD-10-CM

## 2025-03-14 DIAGNOSIS — M54.41 CHRONIC BILATERAL LOW BACK PAIN WITH RIGHT-SIDED SCIATICA: ICD-10-CM

## 2025-03-14 DIAGNOSIS — M25.50 CHRONIC PAIN OF MULTIPLE JOINTS: ICD-10-CM

## 2025-03-17 RX ORDER — HYDROCODONE BITARTRATE AND ACETAMINOPHEN 5; 325 MG/1; MG/1
1 TABLET ORAL EVERY 6 HOURS PRN
Qty: 60 TABLET | Refills: 0 | Status: SHIPPED | OUTPATIENT
Start: 2025-03-17

## 2025-03-17 NOTE — TELEPHONE ENCOUNTER
Last Office Visit: 01/17/2025    Last Refill:   Medication Quantity Refills Start End   HYDROcodone-acetaminophen 5-325 MG Oral Tab 60 tablet 0 2/28/2025 --     Return to Clinic: has appt 09/08/2025    Protocol: n/a    Refill pended. Please approve if okay. Thank you.

## 2025-03-21 DIAGNOSIS — R73.03 PREDIABETES: ICD-10-CM

## 2025-03-21 DIAGNOSIS — Z51.81 ENCOUNTER FOR THERAPEUTIC DRUG MONITORING: ICD-10-CM

## 2025-03-21 DIAGNOSIS — E66.813 CLASS 3 SEVERE OBESITY WITH SERIOUS COMORBIDITY AND BODY MASS INDEX (BMI) OF 50.0 TO 59.9 IN ADULT, UNSPECIFIED OBESITY TYPE: ICD-10-CM

## 2025-03-21 DIAGNOSIS — M15.9 OSTEOARTHRITIS OF MULTIPLE JOINTS, UNSPECIFIED OSTEOARTHRITIS TYPE: ICD-10-CM

## 2025-03-24 ENCOUNTER — OFFICE VISIT (OUTPATIENT)
Dept: INTERNAL MEDICINE CLINIC | Facility: CLINIC | Age: 70
End: 2025-03-24
Payer: MEDICARE

## 2025-03-24 VITALS
BODY MASS INDEX: 42.67 KG/M2 | DIASTOLIC BLOOD PRESSURE: 78 MMHG | WEIGHT: 226 LBS | SYSTOLIC BLOOD PRESSURE: 120 MMHG | HEART RATE: 80 BPM | RESPIRATION RATE: 16 BRPM | HEIGHT: 61 IN

## 2025-03-24 DIAGNOSIS — M15.9 OSTEOARTHRITIS OF MULTIPLE JOINTS, UNSPECIFIED OSTEOARTHRITIS TYPE: ICD-10-CM

## 2025-03-24 DIAGNOSIS — Z51.81 ENCOUNTER FOR THERAPEUTIC DRUG MONITORING: Primary | ICD-10-CM

## 2025-03-24 DIAGNOSIS — E66.813 CLASS 3 SEVERE OBESITY WITH SERIOUS COMORBIDITY AND BODY MASS INDEX (BMI) OF 50.0 TO 59.9 IN ADULT, UNSPECIFIED OBESITY TYPE (HCC): ICD-10-CM

## 2025-03-24 DIAGNOSIS — E66.01 CLASS 3 SEVERE OBESITY WITH SERIOUS COMORBIDITY AND BODY MASS INDEX (BMI) OF 50.0 TO 59.9 IN ADULT, UNSPECIFIED OBESITY TYPE (HCC): ICD-10-CM

## 2025-03-24 DIAGNOSIS — R73.03 PREDIABETES: ICD-10-CM

## 2025-03-24 PROCEDURE — 99213 OFFICE O/P EST LOW 20 MIN: CPT | Performed by: NURSE PRACTITIONER

## 2025-03-24 RX ORDER — TIRZEPATIDE 2.5 MG/.5ML
2.5 INJECTION, SOLUTION SUBCUTANEOUS WEEKLY
Qty: 2 ML | Refills: 0 | Status: CANCELLED | OUTPATIENT
Start: 2025-03-24

## 2025-03-24 RX ORDER — TIRZEPATIDE 2.5 MG/.5ML
INJECTION, SOLUTION SUBCUTANEOUS
Qty: 2 ML | Refills: 0 | OUTPATIENT
Start: 2025-03-24

## 2025-03-24 NOTE — TELEPHONE ENCOUNTER
Requesting zepbound 2.5 gregory direct  LOV: 10/28/24  RTC: 3 months  Filled: 3/5/25 #2ml with 0 refills    Future Appointments   Date Time Provider Department Center   3/24/2025  2:00 PM Eva Springer APRN [facility] [id number]   4/4/2025  9:20 AM Yosef Prince MD [facility]   6/23/2025  2:00 PM Eva Springer APRN [facility] [id number]   8/19/2025 12:30 PM Shailesh Valadez APRN [facility] [place]   9/8/2025 10:30 AM Isaura Nunn MD [facility] [id number]

## 2025-03-24 NOTE — PATIENT INSTRUCTIONS
Continue making lifestyle changes that focus on good nutrition, regular exercise and stress management.    Medication Plan: None at this time. Resume Zepbound 2.5 mg weekly once completed with all upcoming testing.    Next steps to work on before next visit include: Keep up the great work! Recommend [facility] program and schedule sleep study.     Start and/or continue tracking nutrition to remain accountable for both quality and quantity of food. Recommend setting weekly weight loss goal to 1.5-2# and caution adding your exercise, as the johnny may overestimate your daily calories. Use a food scale, measuring cups and spoons. Purchase serving dishes that are 1/4 cup, 1/2 cup and 1 cup servings. Use an johnny such as [employer] (www.mynetdiary.com), TagoodiesPal, or LoseIT! to provide accountably, structure and support.      Balanced Nutrition includes:     Build the mentality of Food 4 Fuel. Clean eating with whole foods and eliminating/reducing ultra processed foods.  Be an intuitive eater and using mindful eating practices.  Eat a balanced plate with protein and produce at all meals: 1/4 plate- protein, 1/2 plate non starchy veggies, and 1/4 plate fruit or complex carbohydrate.  Drink water with all meals and use a salad plate to naturally reduce portions.  Eliminate/reduce late night eating by stopping after 7pm. Allowing your body to fast for 12 hours (drink only water, tea or black coffee without any additives).                Mindful Eating Tips:  When we sit down to a meal by ourselves or with friends, it's easy to zone out and disconnect from our bodies. But, if you approach eating a meal with the intent to stay mindful and present, you will be able to enjoy yourself and walk away from the table feeling good about yourself and your choices. Here are several tips to keep in mind when it comes to food and eating.    Choose for yourself: Do not get hung up on what other people are eating. Instead, ask  yourself what you would like to eat.    Forget about good and bad: Remind yourself that foods fall on a nutritional continuum (high value/low value), not on a moral continuum (good/bad).    Stay clear of guilt or shame: Refrain from allowing guilt or shame to contaminate your eating decisions. Avoid secret eating and get clear on who you are really hiding from if you eat in secret.    Choose foods that you like: Do not eat foods that you do not find satisfying or enjoyable. Eating that way will make you feel like you are on a diet.    Look before you eat: Before you eat, look at your food, its portion size, and presentation. Breathe deeply. Look again before taking a mouthful.    Chew every mouthful: Chewing a lot helps to thoroughly release the flavor of foods. Let food sit on your tongue. This allows your taste buds to absorb the flavor and transmit messages about your appetite to your brain.    Talk or eat: When you are talking, stop eating. When you are eating, stop talking.    Stay connected: Pay attention to your body's appetite signals while you are eating.    Pause while you are eating: Think about how you are feeling about your food in terms of quality and quantity.    Know when to stop eating: Stop eating when flavor intensity declines, as it is bound to do. Do not try to polish off all of the food in front of you. Instead, aim for the moment when flavor peaks and you feel an internal “ah” of satisfaction--then stop.    Evaluate how full you are: Keep asking yourself while you are eating, “Am I still hungry?” and “Am I satisfied?”    by Agustina Humphreys Replaced by [facility]  and

## 2025-03-24 NOTE — PROGRESS NOTES
Tejal Franz is a 69 year old female presents today for follow-up on medical weight loss program for the treatment of overweight, obesity, or morbid obesity with associated OA, prediabetes.    S:  Current weight   Wt Readings from Last 6 Encounters:   03/24/25 226 lb (102.5 kg)   01/17/25 253 lb 1.4 oz (114.8 kg)   12/10/24 289 lb (131.1 kg)   11/20/24 289 lb (131.1 kg)   11/06/24 289 lb (131.1 kg)   10/28/24 289 lb (131.1 kg)    AND BMI Body mass index is 42.7 kg/m²..    Patient has lost 63# since LOV 5 month ago as NP consult.  She has been off OOP Zepbound 2.5 mg weekly at present d/t multiple procedures. Plans to restart once procedures are done. Hx of chronically disrupted sleep. Exercise near impossible at present d/t multiple joint pains.    Testing/consult completed since LOV: Dietician: [facility]    Question 3/24/2025 10:20 AM CDT - Filed by Patient   Please describe a success moment: Successful with appetite and food choices   Please describe a challenging moment/needs for improvement: None   Please complete this 24 hour food journal, listing everything you had to eat in the past day. Include the average time of day you ate these meals at    List foods, qty and prep for breakfast: Greek Yogurt   List foods, qty and prep for lunch. Salad and Greek Yogurt or Smart Ones Breakfast and Yogurt   List foods, qty and prep for dinner. Tortilla with Turkey, Greek Yogurt   List foods, qty and prep for snacks. None   List the types and qty of fluids consumed 1 cup coffee in the morning, water throughout the day   On average, how many meals did you eat out per week? 0   Exercise    How many days per week are you active or exercise 0   On average, how many days were anaerobic (strength/resistance) exercises performed? 0   On average, how many days were aerobic (cardio) exercises performed? 0   Perceived level of exertion on a scale of 1-5, with 5 being very intense: 1   Stress     Average stress level on a scale of 1-10, with 10 being extremely stressed: 7   If greater than 5/1O how would you grade your coping mechanisms? fair   Sleep hours and integrity    How many hours of uninterrupted sleep do you get a night: 6   Do you feel rested in the morning: No   If no, what may have been disrupting your sleep? Need to get up to go to the bathroom   Please list any goal(s) for your next visit Continue to lose weight so that I am eligible for hip replacement       Social hx and PMH reviewed. Employed from home.  with adult children.    REVIEW OF SYSTEMS:  GENERAL: feels well otherwise, +fatigue, +snoring  EYES: denies vision changes or high pain/pressure.  LUNGS: denies shortness of breath with exertion  CARDIOVASCULAR: denies chest pain on exertion, denies palpitations or pedal edema  GI: denies abdominal pain.  No N/V/D/C  MUSCULOSKELETAL: no acute joint or muscle pain. +chronic multiple joint pains  NEURO: denies headaches  PSYCH: denies change in behavior or mood, denies feeling sad or depressed    EXAM:  /78   Pulse 80   Resp 16   Ht 5' 1\" (1.549 m)   Wt 226 lb (102.5 kg)   BMI 42.70 kg/m²    GENERAL: well developed, well nourished, in no apparent distress, morbidly obese  EYES: conjunctiva pink, sclera non icteric, PERRLA  LUNGS: CTA in all fields, breathing non labored  CARDIO: RRR without murmur, normal S1 and S2 without clicks or gallops, no pedal edema.  GI: +BS  NEURO/MS: motor and sensory grossly intact. In wheelchair for exam.  PSYCH: pleasant, cooperative, normal mood and affect    ASSESSMENT AND PLAN:  Reviewed Initial Weight Data and Goal Weight Loss:       Encounter Diagnoses   Name Primary?    Encounter for therapeutic drug monitoring Yes    Class 3 severe obesity with serious comorbidity and body mass index (BMI) of 50.0 to 59.9 in adult, unspecified obesity type (HCC)     Osteoarthritis of multiple joints, unspecified osteoarthritis type     Prediabetes         No orders of the defined types were placed in this encounter.      Meds & Refills for this Visit:  Requested Prescriptions      No prescriptions requested or ordered in this encounter       Imaging & Consults:  OP REFERRAL TO DIAGNOSTIC SLEEP STUDY  OP REFERRAL [facility]      Plan:  Patient has lost 63# since LOV 5 month ago on Zepbound 2.5 mg weekly with a total weight loss of 63# since initial consult on 10/28/24 with initial weight of 289# and historical baseline BMI 54.63.  Weight loss goal: lose 40# in 6 months and 80# in 1 year.  Restart Zepbound once procedures complete. Refer to [facility] and sleep study.  on tracking nutrition and mindful eating. See patient instructions below for additional plans and patient counseling.      Patient Instructions   Continue making lifestyle changes that focus on good nutrition, regular exercise and stress management.    Medication Plan: None at this time. Resume Zepbound 2.5 mg weekly once completed with all upcoming testing.    Next steps to work on before next visit include: Keep up the great work! Recommend [facility] program and schedule sleep study.     Start and/or continue tracking nutrition to remain accountable for both quality and quantity of food. Recommend setting weekly weight loss goal to 1.5-2# and caution adding your exercise, as the johnny may overestimate your daily calories. Use a food scale, measuring cups and spoons. Purchase serving dishes that are 1/4 cup, 1/2 cup and 1 cup servings. Use an johnny such as [employer] (www.[employer].com), [employer], or [employer]! to provide accountably, structure and support.      Balanced Nutrition includes:     Build the mentality of Food 4 Fuel. Clean eating with whole foods and eliminating/reducing ultra processed foods.  Be an intuitive eater and using mindful eating practices.  Eat a balanced plate with protein and produce at all meals: 1/4 plate- protein, 1/2 plate non starchy veggies, and  1/4 plate fruit or complex carbohydrate.  Drink water with all meals and use a salad plate to naturally reduce portions.  Eliminate/reduce late night eating by stopping after 7pm. Allowing your body to fast for 12 hours (drink only water, tea or black coffee without any additives).                Mindful Eating Tips:  When we sit down to a meal by ourselves or with friends, it's easy to zone out and disconnect from our bodies. But, if you approach eating a meal with the intent to stay mindful and present, you will be able to enjoy yourself and walk away from the table feeling good about yourself and your choices. Here are several tips to keep in mind when it comes to food and eating.    Choose for yourself: Do not get hung up on what other people are eating. Instead, ask yourself what you would like to eat.    Forget about good and bad: Remind yourself that foods fall on a nutritional continuum (high value/low value), not on a moral continuum (good/bad).    Stay clear of guilt or shame: Refrain from allowing guilt or shame to contaminate your eating decisions. Avoid secret eating and get clear on who you are really hiding from if you eat in secret.    Choose foods that you like: Do not eat foods that you do not find satisfying or enjoyable. Eating that way will make you feel like you are on a diet.    Look before you eat: Before you eat, look at your food, its portion size, and presentation. Breathe deeply. Look again before taking a mouthful.    Chew every mouthful: Chewing a lot helps to thoroughly release the flavor of foods. Let food sit on your tongue. This allows your taste buds to absorb the flavor and transmit messages about your appetite to your brain.    Talk or eat: When you are talking, stop eating. When you are eating, stop talking.    Stay connected: Pay attention to your body's appetite signals while you are eating.    Pause while you are eating: Think about how you are feeling about your food in  terms of quality and quantity.    Know when to stop eating: Stop eating when flavor intensity declines, as it is bound to do. Do not try to polish off all of the food in front of you. Instead, aim for the moment when flavor peaks and you feel an internal “ah” of satisfaction--then stop.    Evaluate how full you are: Keep asking yourself while you are eating, “Am I still hungry?” and “Am I satisfied?”    by Agustina Humphreys Replaced by [facility]  and         Medication use and SEs reviewed with patient.    Return in about 3 months (around 6/24/2025) for weight management via clinic as scheduled and then in October.    Patient verbalizes understanding.    DOCUMENTATION OF TIME SPENT: Code selection for this visit was based on time spent : 25 minutes on date of service in preparing to see the patient, obtaining and/or reviewing separately obtained history, performing a medically appropriate examination, counseling and educating the patient/family/caregiver, ordering medications or testing, referring and communicating with other healthcare providers, documenting clinical information in the electronic medical record, independently interpreting results and communicating results to the patient/family/caregiver and care coordination with the patient's other providers.      Answers submitted by the patient for this visit:  Medical Weight Loss Follow Up (Submitted on 3/24/2025)  If greater than 5/1O how would you grade your coping mechanisms?: fair

## 2025-04-02 DIAGNOSIS — G89.29 CHRONIC PAIN OF MULTIPLE JOINTS: ICD-10-CM

## 2025-04-02 DIAGNOSIS — M25.50 ARTHRALGIA OF MULTIPLE JOINTS: ICD-10-CM

## 2025-04-02 DIAGNOSIS — M25.50 CHRONIC PAIN OF MULTIPLE JOINTS: ICD-10-CM

## 2025-04-02 DIAGNOSIS — M17.0 PRIMARY OSTEOARTHRITIS OF BOTH KNEES: ICD-10-CM

## 2025-04-02 DIAGNOSIS — G89.29 CHRONIC BILATERAL LOW BACK PAIN WITH RIGHT-SIDED SCIATICA: ICD-10-CM

## 2025-04-02 DIAGNOSIS — M16.0 PRIMARY OSTEOARTHRITIS OF BOTH HIPS: ICD-10-CM

## 2025-04-02 DIAGNOSIS — M54.41 CHRONIC BILATERAL LOW BACK PAIN WITH RIGHT-SIDED SCIATICA: ICD-10-CM

## 2025-04-04 ENCOUNTER — HOSPITAL ENCOUNTER (OUTPATIENT)
Dept: GENERAL RADIOLOGY | Age: 70
Discharge: HOME | End: 2025-04-04
Attending: ORTHOPAEDIC SURGERY
Payer: MEDICARE

## 2025-04-04 ENCOUNTER — TELEPHONE (OUTPATIENT)
Dept: FAMILY MEDICINE CLINIC | Facility: CLINIC | Age: 70
End: 2025-04-04

## 2025-04-04 ENCOUNTER — OFFICE VISIT (OUTPATIENT)
Dept: ORTHOPEDICS CLINIC | Facility: CLINIC | Age: 70
End: 2025-04-04
Payer: MEDICARE

## 2025-04-04 ENCOUNTER — TELEPHONE (OUTPATIENT)
Dept: ORTHOPEDICS CLINIC | Facility: CLINIC | Age: 70
End: 2025-04-04

## 2025-04-04 VITALS — BODY MASS INDEX: 44.25 KG/M2 | HEIGHT: 60 IN | WEIGHT: 225.38 LBS

## 2025-04-04 DIAGNOSIS — Z01.818 PRE-OP EVALUATION: Primary | ICD-10-CM

## 2025-04-04 DIAGNOSIS — M16.12 PRIMARY OSTEOARTHRITIS OF LEFT HIP: ICD-10-CM

## 2025-04-04 DIAGNOSIS — M54.50 LOW BACK PAIN, UNSPECIFIED BACK PAIN LATERALITY, UNSPECIFIED CHRONICITY, UNSPECIFIED WHETHER SCIATICA PRESENT: ICD-10-CM

## 2025-04-04 DIAGNOSIS — M16.0 PRIMARY OSTEOARTHRITIS OF BOTH HIPS: Primary | ICD-10-CM

## 2025-04-04 DIAGNOSIS — M43.10 SPONDYLISTHESIS: ICD-10-CM

## 2025-04-04 DIAGNOSIS — M16.0 PRIMARY OSTEOARTHRITIS OF BOTH HIPS: ICD-10-CM

## 2025-04-04 PROCEDURE — 72220 X-RAY EXAM SACRUM TAILBONE: CPT | Performed by: ORTHOPAEDIC SURGERY

## 2025-04-04 PROCEDURE — 73502 X-RAY EXAM HIP UNI 2-3 VIEWS: CPT | Performed by: ORTHOPAEDIC SURGERY

## 2025-04-04 PROCEDURE — 99214 OFFICE O/P EST MOD 30 MIN: CPT | Performed by: ORTHOPAEDIC SURGERY

## 2025-04-04 NOTE — TELEPHONE ENCOUNTER
Did not pass protocol  Last office visit 1/17  Last refill was:  YDROcodone-acetaminophen 5-325 MG Oral Tab 60 tablet 0 3/17/2025 --   Sig:   Take 1 tablet by mouth every 6 (six) hours as needed for Pain.     Route:   Oral     PRN Reason(s):   Pain     Earliest Fill Date:   3/17/2025     Order #:   [id number]       Next appointment: 9/8/25    Please sign pended medication if appropriate

## 2025-04-04 NOTE — TELEPHONE ENCOUNTER
Pre-Op paperwork received by fax.    Date of Surgery:  7/15/25  Surgeon: Dr. Prince  Procedure: Left posterior cemented total hip arthroplasty      On day of pre-op will provide:  H&P and results of CBC,CMP,PT/INR,PTT,Type and Screen  And fax results to: [phone]; [phone]

## 2025-04-04 NOTE — PROGRESS NOTES
[facility] Progress Note    Subjective: 69-year-old female returns to clinic today to discuss her hips.  Since she was last seen by me over a year ago for hip injections, she notes that the hips have become progressively worse.  She additionally has had increasing pain in other areas including her low back and has seen my partner Dr. Ambriz who diagnosed her with L5-S1 spondylolisthesis and severe spinal stenosis with radiculopathy.  She notes that the hips however are her most limiting factor, with the left hip hurting more than the right.  She notes pain around the hip, points near the groin and proximal thigh.  She states symptoms have gotten progressively worse to the point that she has difficulty bearing weight, has been using a wheelchair since January.  She does have her brother helping her.  She notes that there is more creaking and cracking around the hips.  She has done injections, as well as radiofrequency ablation, notes that these have been providing less relief.  She has successfully lost more than 60 pounds with the [facility], currently on Zepbound.  She has been taking hydrocodone up to 3 times per day for pain in addition to NSAID.    Objective: BMI today 44 with height of 5 1 and weight 225  Patient appears comfortable.  She has difficulty flexing the hips bilaterally left more than right due to pain and stiffness.  Passive range of motion does cause crepitus about the hip.  There is abdominal pannus overhanging anteriorly, with some soft tissue redundancy laterally.      Reports: Weight loss clinic note reviewed, patient was 63 pound weight loss over 5 months on Zepbound      Assessment/Plan: 69-year-old female with symptomatic radiographically severe bilateral hip osteoarthritis in addition to multiple other musculoskeletal issues, severe spine disease.  The patient has reasonably attempted nonsurgical treatments as mentioned above and remains limited in activities of daily living  secondary to pain from hip arthritis.  I discussed risks and benefits of surgery, including but not limited to infection, blood clots, fracture, bleeding and need for blood transfusion, injury to blood vessels and nerves, loosening or failure of components, instability/dislocation, leg length discrepancy, continued pain, need for further intervention or revision surgery.  We did discuss the significantly increased risk of complications due to body mass index, deconditioning, other comorbidities including spine disease.  Did discuss increased risk of instability and dislocation given the spine disease, and did order sitting and standing sacral views to help understand her spinal deformity preoperatively.  We did discuss risks of wound healing issues, infection, what management of this entails.  We did discuss continued pain from other sources following elimination of hip arthritis with hip replacement.  We also discussed potential for difficulty with rehabilitation and recovery given the deconditioning, limited mobility at baseline, possibility of prolonged recovery from surgery.  At this point she has successfully improved BMI, and although this is still a risk factor for complications, she has been motivated to continue and body mass index is significantly improved from prior visit.  As she does plan to continue this trend, would be reasonable to consider surgery given her significant and more severe recent decline in function due to pain from arthritis.  The patient understands this discussion and elects to proceed with hip replacement surgery.  She would like to proceed with left hip replacement first.  Hip films as well as sacral films ordered today.  She will need primary care evaluation and clearance within 30 days of the surgery date.  Will need to be performed at least 3 months after most recent injection to the hip.  Will plan for posterior approach with cemented stem.  Binder was provided today.    Yosef  MD Suresh, FAAOS  [facility]  Phone [phone]  Fax [phone]

## 2025-04-04 NOTE — PROGRESS NOTES
SURGERY SCHEDULING SHEET    Tejal Franz  9/11/1955  [id number]    Procedure: Left posterior cemented total hip arthroplasty    CPT: 50986    Diagnosis: Left hip osteoarthritis    Anesthesia: Choice    Length of Surgery: 2 hours    Disposition: Inpatient    Instruments: [place] ESTRELLITA    Assist: If case scheduled on Thurs/Fri, then Vazquez Kaplan first assist. If case scheduled on Tues, then Jaron Enocmatthew ([phone]) first assist. If Jaron unavailable, then please communicate to Vazquez Kaplan/Rambo/Sadaf to cancel Vazquez's clinic for that day and have Vazquez first assist. If Vazquez unavailable, contact Ignacio Puentes for first assist. If Jaron and Ignacio unavailable, then contact [facility] for first assist.    Pre-op Testing: CBC, CMP, PT/INR, PTT, TYPE AND SCREEN, and MRSA/MSSA THROUGH EDW PAT    Clearance: MEDICAL/PCP    Post op: 2 weeks postop appointment with Vazquez Kaplan    Surgery date specifics: At least 3 months from today's date (recent hip injection)    Yosef Prince MD, FAAOS  [facility]  Phone: [phone]  Fax: [phone]

## 2025-04-04 NOTE — TELEPHONE ENCOUNTER
Date of Surgery:    7/15/2025    Post Op Appt:  7/30/2025 940    Case ID: [id number]     Notes: Added to Wait List              SURGERY SCHEDULING SHEET     Tejal Franz  9/11/1955  [id number]     Procedure: Left posterior cemented total hip arthroplasty     CPT: 86432     Diagnosis: Left hip osteoarthritis     Anesthesia: Choice     Length of Surgery: 2 hours     Disposition: Inpatient     Instruments: [place] ESTRELLITA     Assist: If case scheduled on Thurs/Fri, then Vazquez Kaplan first assist. If case scheduled on Tues, then Jaron Cuba ([phone]) first assist. If Jaron unavailable, then please communicate to Vazquez Kaplan/Rambo/Sadaf to cancel Vazquez's clinic for that day and have Vazquez first assist. If Vazquez unavailable, contact Ignacio Puentes for first assist. If Jaron and Ignacio unavailable, then contact [facility] for first assist.     Pre-op Testing: CBC, CMP, PT/INR, PTT, TYPE AND SCREEN, and MRSA/MSSA THROUGH EDW PAT     Clearance: MEDICAL/PCP     Post op: 2 weeks postop appointment with Vazquez Kaplan     Surgery date specifics: At least 3 months from today's date (recent hip injection)     Yosef Prince MD, FAAOS  [facility]  Phone: [phone]  Fax: [phone]

## 2025-04-07 RX ORDER — HYDROCODONE BITARTRATE AND ACETAMINOPHEN 5; 325 MG/1; MG/1
1 TABLET ORAL EVERY 6 HOURS PRN
Qty: 60 TABLET | Refills: 0 | Status: SHIPPED | OUTPATIENT
Start: 2025-04-07

## 2025-04-07 NOTE — TELEPHONE ENCOUNTER
Called patient to schedule. Patient is trying to move the surgery date and will call back with a final date to schedule pre op

## 2025-04-13 DIAGNOSIS — G89.29 CHRONIC PAIN OF MULTIPLE JOINTS: ICD-10-CM

## 2025-04-13 DIAGNOSIS — M17.0 PRIMARY OSTEOARTHRITIS OF BOTH KNEES: ICD-10-CM

## 2025-04-13 DIAGNOSIS — M16.0 PRIMARY OSTEOARTHRITIS OF BOTH HIPS: ICD-10-CM

## 2025-04-13 DIAGNOSIS — M25.50 CHRONIC PAIN OF MULTIPLE JOINTS: ICD-10-CM

## 2025-04-13 DIAGNOSIS — M25.50 ARTHRALGIA OF MULTIPLE JOINTS: ICD-10-CM

## 2025-04-13 DIAGNOSIS — G89.29 CHRONIC BILATERAL LOW BACK PAIN WITH RIGHT-SIDED SCIATICA: ICD-10-CM

## 2025-04-13 DIAGNOSIS — M54.41 CHRONIC BILATERAL LOW BACK PAIN WITH RIGHT-SIDED SCIATICA: ICD-10-CM

## 2025-04-13 RX ORDER — GABAPENTIN 300 MG/1
900 CAPSULE ORAL 3 TIMES DAILY
Qty: 270 CAPSULE | Refills: 2 | Status: CANCELLED | OUTPATIENT
Start: 2025-04-13

## 2025-04-15 ENCOUNTER — PATIENT MESSAGE (OUTPATIENT)
Dept: FAMILY MEDICINE CLINIC | Facility: CLINIC | Age: 70
End: 2025-04-15

## 2025-04-15 NOTE — TELEPHONE ENCOUNTER
I called patient back and she had confirmed that #270 is a month supply and she just received the last one.  Advised patient to call our office 1 week prior to this one's completion for the addition refill of #270.  Patient voiced understanding and agreed with plan.

## 2025-04-15 NOTE — TELEPHONE ENCOUNTER
Last Office Visit: 01/17/2025    Last Refill:   Medication Quantity Refills Start End   gabapentin 300 MG Oral Cap 270 capsule 2 1/17/2025 --     Return to Clinic: has appt 07/01/2025 with navi Moffett    Protocol: patient should have refill available

## 2025-04-22 NOTE — TELEPHONE ENCOUNTER
Patient called to cancel surgery. She was able to get in sooner with a different surgeon.    Surgery Canceled

## 2025-04-24 DIAGNOSIS — M16.0 PRIMARY OSTEOARTHRITIS OF BOTH HIPS: ICD-10-CM

## 2025-04-24 DIAGNOSIS — M25.50 CHRONIC PAIN OF MULTIPLE JOINTS: ICD-10-CM

## 2025-04-24 DIAGNOSIS — M54.41 CHRONIC BILATERAL LOW BACK PAIN WITH RIGHT-SIDED SCIATICA: ICD-10-CM

## 2025-04-24 DIAGNOSIS — M25.50 ARTHRALGIA OF MULTIPLE JOINTS: ICD-10-CM

## 2025-04-24 DIAGNOSIS — M17.0 PRIMARY OSTEOARTHRITIS OF BOTH KNEES: ICD-10-CM

## 2025-04-24 DIAGNOSIS — G89.29 CHRONIC BILATERAL LOW BACK PAIN WITH RIGHT-SIDED SCIATICA: ICD-10-CM

## 2025-04-24 DIAGNOSIS — G89.29 CHRONIC PAIN OF MULTIPLE JOINTS: ICD-10-CM

## 2025-04-25 NOTE — TELEPHONE ENCOUNTER
Last Office Visit: 01/17/2025    Last Refill:   Medication Quantity Refills Start End   HYDROcodone-acetaminophen 5-325 MG Oral Tab 60 tablet 0 4/7/2025 --     Return to Clinic: sohail appt 07/01/2025    Protocol: n/a    Refill pended. Please approve if okay. Thank you.

## 2025-04-26 RX ORDER — HYDROCODONE BITARTRATE AND ACETAMINOPHEN 5; 325 MG/1; MG/1
1 TABLET ORAL EVERY 6 HOURS PRN
Qty: 60 TABLET | Refills: 0 | Status: SHIPPED | OUTPATIENT
Start: 2025-04-26

## 2025-04-28 ENCOUNTER — TELEPHONE (OUTPATIENT)
Dept: FAMILY MEDICINE CLINIC | Facility: CLINIC | Age: 70
End: 2025-04-28

## 2025-04-28 DIAGNOSIS — Z01.818 PRE-OP EXAM: Primary | ICD-10-CM

## 2025-04-29 NOTE — TELEPHONE ENCOUNTER
Patient called wanting to schedule her pre op exam.    Patient's surgery day is on 6/29/25. Dr. RIVERA will be out of office. Should the patient be scheduled with Gabby Moffett or Dr. Pelaez to have the pre op exam done.    Please Advise

## 2025-05-13 DIAGNOSIS — M25.50 ARTHRALGIA OF MULTIPLE JOINTS: ICD-10-CM

## 2025-05-13 DIAGNOSIS — M17.0 PRIMARY OSTEOARTHRITIS OF BOTH KNEES: ICD-10-CM

## 2025-05-13 DIAGNOSIS — G89.29 CHRONIC BILATERAL LOW BACK PAIN WITH RIGHT-SIDED SCIATICA: ICD-10-CM

## 2025-05-13 DIAGNOSIS — M54.41 CHRONIC BILATERAL LOW BACK PAIN WITH RIGHT-SIDED SCIATICA: ICD-10-CM

## 2025-05-13 DIAGNOSIS — M16.0 PRIMARY OSTEOARTHRITIS OF BOTH HIPS: ICD-10-CM

## 2025-05-13 DIAGNOSIS — G89.29 CHRONIC PAIN OF MULTIPLE JOINTS: ICD-10-CM

## 2025-05-13 DIAGNOSIS — M25.50 CHRONIC PAIN OF MULTIPLE JOINTS: ICD-10-CM

## 2025-05-15 ENCOUNTER — PATIENT MESSAGE (OUTPATIENT)
Dept: FAMILY MEDICINE CLINIC | Facility: CLINIC | Age: 70
End: 2025-05-15

## 2025-05-16 RX ORDER — GABAPENTIN 300 MG/1
900 CAPSULE ORAL 3 TIMES DAILY
Qty: 270 CAPSULE | Refills: 2 | Status: SHIPPED | OUTPATIENT
Start: 2025-05-16

## 2025-05-16 NOTE — TELEPHONE ENCOUNTER
Requested Prescriptions     Pending Prescriptions Disp Refills    gabapentin 300 MG Oral Cap 270 capsule 2     Sig: Take 3 capsules (900 mg total) by mouth 3 (three) times daily.     Last refill 1/17/25 #270 x 2  LOV 1/17/25  Future Appointments   Date Time Provider Department Center   5/27/2025 11:00 AM Weston Pelaez MD [facility] [id number]                 9/8/2025 10:30 AM Isaura Nunn MD [facility] [id number]

## 2025-05-26 DIAGNOSIS — G89.29 CHRONIC PAIN OF MULTIPLE JOINTS: ICD-10-CM

## 2025-05-26 DIAGNOSIS — M17.0 PRIMARY OSTEOARTHRITIS OF BOTH KNEES: ICD-10-CM

## 2025-05-26 DIAGNOSIS — M25.50 ARTHRALGIA OF MULTIPLE JOINTS: ICD-10-CM

## 2025-05-26 DIAGNOSIS — M54.41 CHRONIC BILATERAL LOW BACK PAIN WITH RIGHT-SIDED SCIATICA: ICD-10-CM

## 2025-05-26 DIAGNOSIS — M16.0 PRIMARY OSTEOARTHRITIS OF BOTH HIPS: ICD-10-CM

## 2025-05-26 DIAGNOSIS — G89.29 CHRONIC BILATERAL LOW BACK PAIN WITH RIGHT-SIDED SCIATICA: ICD-10-CM

## 2025-05-26 DIAGNOSIS — M25.50 CHRONIC PAIN OF MULTIPLE JOINTS: ICD-10-CM

## 2025-05-27 ENCOUNTER — EKG ENCOUNTER (OUTPATIENT)
Dept: LAB | Age: 70
End: 2025-05-27
Attending: STUDENT IN AN ORGANIZED HEALTH CARE EDUCATION/TRAINING PROGRAM
Payer: MEDICARE

## 2025-05-27 ENCOUNTER — OFFICE VISIT (OUTPATIENT)
Dept: FAMILY MEDICINE CLINIC | Facility: CLINIC | Age: 70
End: 2025-05-27
Payer: MEDICARE

## 2025-05-27 VITALS
DIASTOLIC BLOOD PRESSURE: 60 MMHG | WEIGHT: 225 LBS | BODY MASS INDEX: 44.17 KG/M2 | TEMPERATURE: 98 F | HEART RATE: 70 BPM | RESPIRATION RATE: 18 BRPM | SYSTOLIC BLOOD PRESSURE: 112 MMHG | HEIGHT: 60 IN

## 2025-05-27 DIAGNOSIS — Z51.81 MEDICATION MONITORING ENCOUNTER: ICD-10-CM

## 2025-05-27 DIAGNOSIS — Z01.818 PRE-OP EXAM: ICD-10-CM

## 2025-05-27 DIAGNOSIS — Z01.818 PRE-OP EVALUATION: ICD-10-CM

## 2025-05-27 DIAGNOSIS — Z01.818 PREOP GENERAL PHYSICAL EXAM: Primary | ICD-10-CM

## 2025-05-27 LAB
ALBUMIN SERPL-MCNC: 4.4 G/DL (ref 3.2–4.8)
ALBUMIN/GLOB SERPL: 1.6 {RATIO} (ref 1–2)
ALP LIVER SERPL-CCNC: 47 U/L (ref 55–142)
ALT SERPL-CCNC: 14 U/L (ref 10–49)
ANION GAP SERPL CALC-SCNC: 11 MMOL/L (ref 0–18)
AST SERPL-CCNC: 20 U/L (ref ?–34)
ATRIAL RATE: 63 BPM
BASOPHILS # BLD AUTO: 0.03 X10(3) UL (ref 0–0.2)
BASOPHILS NFR BLD AUTO: 0.4 %
BILIRUB SERPL-MCNC: 0.4 MG/DL (ref 0.2–1.1)
BILIRUB UR QL STRIP.AUTO: NEGATIVE
BUN BLD-MCNC: 21 MG/DL (ref 9–23)
CALCIUM BLD-MCNC: 10.2 MG/DL (ref 8.7–10.6)
CHLORIDE SERPL-SCNC: 102 MMOL/L (ref 98–112)
CLARITY UR REFRACT.AUTO: CLEAR
CO2 SERPL-SCNC: 27 MMOL/L (ref 21–32)
COLOR UR AUTO: YELLOW
CREAT BLD-MCNC: 0.8 MG/DL (ref 0.55–1.02)
EGFRCR SERPLBLD CKD-EPI 2021: 79 ML/MIN/1.73M2 (ref 60–?)
EOSINOPHIL # BLD AUTO: 0.19 X10(3) UL (ref 0–0.7)
EOSINOPHIL NFR BLD AUTO: 2.4 %
ERYTHROCYTE [DISTWIDTH] IN BLOOD BY AUTOMATED COUNT: 14.1 %
EST. AVERAGE GLUCOSE BLD GHB EST-MCNC: 114 MG/DL (ref 68–126)
FASTING STATUS PATIENT QL REPORTED: YES
GLOBULIN PLAS-MCNC: 2.8 G/DL (ref 2–3.5)
GLUCOSE BLD-MCNC: 85 MG/DL (ref 70–99)
GLUCOSE UR STRIP.AUTO-MCNC: NORMAL MG/DL
HBA1C MFR BLD: 5.6 % (ref ?–5.7)
HCT VFR BLD AUTO: 41.7 % (ref 35–48)
HGB BLD-MCNC: 13.9 G/DL (ref 12–16)
IMM GRANULOCYTES # BLD AUTO: 0.01 X10(3) UL (ref 0–1)
IMM GRANULOCYTES NFR BLD: 0.1 %
KETONES UR STRIP.AUTO-MCNC: NEGATIVE MG/DL
LEUKOCYTE ESTERASE UR QL STRIP.AUTO: NEGATIVE
LYMPHOCYTES # BLD AUTO: 3.07 X10(3) UL (ref 1–4)
LYMPHOCYTES NFR BLD AUTO: 38.9 %
MCH RBC QN AUTO: 30.2 PG (ref 26–34)
MCHC RBC AUTO-ENTMCNC: 33.3 G/DL (ref 31–37)
MCV RBC AUTO: 90.7 FL (ref 80–100)
MONOCYTES # BLD AUTO: 0.54 X10(3) UL (ref 0.1–1)
MONOCYTES NFR BLD AUTO: 6.8 %
NEUTROPHILS # BLD AUTO: 4.06 X10 (3) UL (ref 1.5–7.7)
NEUTROPHILS # BLD AUTO: 4.06 X10(3) UL (ref 1.5–7.7)
NEUTROPHILS NFR BLD AUTO: 51.4 %
NITRITE UR QL STRIP.AUTO: NEGATIVE
OSMOLALITY SERPL CALC.SUM OF ELEC: 292 MOSM/KG (ref 275–295)
P AXIS: 46 DEGREES
P-R INTERVAL: 164 MS
PH UR STRIP.AUTO: 6.5 [PH] (ref 5–8)
PLATELET # BLD AUTO: 221 10(3)UL (ref 150–450)
POTASSIUM SERPL-SCNC: 4.2 MMOL/L (ref 3.5–5.1)
PROT SERPL-MCNC: 7.2 G/DL (ref 5.7–8.2)
PROT UR STRIP.AUTO-MCNC: NEGATIVE MG/DL
Q-T INTERVAL: 388 MS
QRS DURATION: 96 MS
QTC CALCULATION (BEZET): 397 MS
R AXIS: 20 DEGREES
RBC # BLD AUTO: 4.6 X10(6)UL (ref 3.8–5.3)
RBC UR QL AUTO: NEGATIVE
SODIUM SERPL-SCNC: 140 MMOL/L (ref 136–145)
SP GR UR STRIP.AUTO: 1.02 (ref 1–1.03)
T AXIS: 27 DEGREES
UROBILINOGEN UR STRIP.AUTO-MCNC: NORMAL MG/DL
VENTRICULAR RATE: 63 BPM
WBC # BLD AUTO: 7.9 X10(3) UL (ref 4–11)

## 2025-05-27 PROCEDURE — 80053 COMPREHEN METABOLIC PANEL: CPT

## 2025-05-27 PROCEDURE — 93005 ELECTROCARDIOGRAM TRACING: CPT

## 2025-05-27 PROCEDURE — 99214 OFFICE O/P EST MOD 30 MIN: CPT | Performed by: FAMILY MEDICINE

## 2025-05-27 PROCEDURE — 93010 ELECTROCARDIOGRAM REPORT: CPT | Performed by: INTERNAL MEDICINE

## 2025-05-27 PROCEDURE — 36415 COLL VENOUS BLD VENIPUNCTURE: CPT

## 2025-05-27 PROCEDURE — 85025 COMPLETE CBC W/AUTO DIFF WBC: CPT

## 2025-05-27 PROCEDURE — 83036 HEMOGLOBIN GLYCOSYLATED A1C: CPT

## 2025-05-27 PROCEDURE — 81003 URINALYSIS AUTO W/O SCOPE: CPT

## 2025-05-27 NOTE — PROGRESS NOTES
[facility] Family [facility] Office Note  Chief Complaint:   Chief Complaint   Patient presents with    Follow - Up       HPI:   This is a 69 year old female coming in for preop physical.  The patient will be undergoing bilateral robotic posterior total hip arthroplasty.  Patient denies any issues with anesthesia in the past.  Denies any bleeding disorders with herself or family.  Denies any chest pain nausea vomiting diarrhea constipation.  She      Past Medical History[1]  Past Surgical History[2]  Social History:  Short Social Hx on File[3]  Family History:  Family History[4]  Allergies:  Allergies[5]  Current Meds:  Current Medications[6]   Counseling given: Not Answered  Tobacco comments: quit about 8 years ago  Updated 4/4/25       REVIEW OF SYSTEMS:   Consitutional: No fevers, chills, sweats  Eye: No recent visual problems  ENMT: No ear pain nasal congestion sore throat  Respiratory: No shortness of breath, cough  Cardiovascular: No chest pain palpitations syncope  Gastrointestinal: No nausea vomiting diarrhea  Genitourinary: No hematuria  Hema/Lymph no bruising tendency, swollen lymph glands  Endocrine: Negative for excessive thirst excessive hunger  Musculoskeletal: No back pain neck pain joint pain muscle pain decreased range of motion  Integumentary: No rash, pruritus, abrasions  Neurologic: Alert and oriented x4  Psychiatric: No anxiety, depression    Medical, surgical, family, and social histories were reviewed      EXAM:     VITALS:   Vitals:    05/27/25 1105   BP: 112/60   Pulse: 70   Resp: 18   Temp: 97.5 °F (36.4 °C)      GENERAL: well developed, well nourished, in no apparent distress  SKIN: no rashes, no suspicious lesions: Cool and Dry  HEENT: atraumatic, normocephalic, ears and throat are clear    Ears: TM's clear and visible bilaterally, no excess cerumen or erythema.   EYES: Pupils equal round and reactive.  Extraocular motions intact no scleral icterus no injection or drainage  THROAT  without erythema tonsillar hypertrophy or exudate.  Uvula midline airway patent  NECK: Given midline.  No JVD or lymphadenopathy supple nontender no meningeal signs   LUNGS: clear to auscultation sounds equal bilaterally no wheezes rales or rhonchi  CARDIO: Regular rate and rhythm without murmurs gallops or rubs  GI: Soft nontender nondistended no hepatomegaly palpable masses.  No guarding.   without deformity or crepitus no flank tenderness  EXTREMITIES: no cyanosis, clubbing or edema no joint tenderness effusion or edema noted.  No calf tenderness negative Homans' sign bilaterally  NEURO: Awake and alert.  Cranial nerves II through XII intact motor and sensory grossly within normal limits.  5 out of 5 muscle strength in all muscle groups.  Normal speech.  PSYCHIATRIC: Awake, alert and oriented x3, cooperative appropriate mood and affect.  Judgment intact       ASSESSMENT AND PLAN:   1. Preop general physical exam  Patient has a moderate risk for a moderate risk surgery she at this time was asked to update blood work as well as an EKG once this is completed we will clear her for surgery.  She was asked to discontinue any aspirin ibuprofen Aleve Advil diclofenac at least 10 days before surgery    Meds & Refills for this Visit:  Requested Prescriptions      No prescriptions requested or ordered in this encounter       Health Maintenance:  Health Maintenance Due   Topic Date Due    Colorectal Cancer Screening  Never done    Mammogram  Never done    Pneumococcal Vaccine: 50+ Years (1 of 1 - PCV) Never done    Zoster Vaccines (2 of 3) 01/12/2016    DEXA Scan  Never done    COVID-19 Vaccine (1 - 2024-25 season) Never done    Annual Physical  05/14/2025       Patient/Caregiver Education: Patient/Caregiver Education: There are no barriers to learning. Medical education done.   Outcome: Patient verbalizes understanding. Patient is notified to call with any questions, complications, allergies, or worsening or  changing symptoms.  Patient is to call with any side effects or complications from the treatments as a result of today.     Problem List:  Problem List[7]      No follow-ups on file.     Weston Pelaez MD    Please note that portions of this note may have been completed with a voice recognition program. Efforts were made to edit the dictations but occasionally words are mis-transcribed.       [1]   Past Medical History:   Arthritis    First Treatment by Dr. Prince    Depression    Obesity, unspecified   [2]   Past Surgical History:  Procedure Laterality Date    Cataract            Other surgical history      rectovag fistula    Other surgical history      gum surgery    Other surgical history      wisdom tooth   [3]   Social History  Socioeconomic History    Marital status:     Number of children: 2   Occupational History    Occupation: office work   Tobacco Use    Smoking status: Former     Current packs/day: 0.00     Average packs/day: 1.8 packs/day for 32.0 years (57.6 ttl pk-yrs)     Types: Cigarettes     Start date: 1972     Quit date: 2004     Years since quittin.4    Smokeless tobacco: Never    Tobacco comments:     quit about 8 years ago     Updated 25   Vaping Use    Vaping status: Never Used   Substance and Sexual Activity    Alcohol use: Yes     Alcohol/week: 2.0 standard drinks of alcohol     Types: 2 Glasses of wine per week     Comment: I only have alcohol occasionally.    Drug use: Never    Sexual activity: Not Currently   Other Topics Concern    Caffeine Concern No    Stress Concern No    Weight Concern Yes    Special Diet No    Exercise No    Seat Belt No   [4]   Family History  Problem Relation Age of Onset    Heart Disorder Father         pacemaker    Other (Other) Father         COPD    Heart Disorder Mother         MI; smoker    Depression Mother     Diabetes Mother     Obesity Mother     Psychiatric Mother     Cancer Paternal Grandmother     Depression  Sister     Obesity Sister     Psychiatric Sister    [5] No Known Allergies  [6]   Current Outpatient Medications   Medication Sig Dispense Refill    traZODone 50 MG Oral Tab Take 1 tablet (50 mg total) by mouth nightly as needed for Sleep. 30 tablet 0    gabapentin 300 MG Oral Cap Take 3 capsules (900 mg total) by mouth 3 (three) times daily. 270 capsule 2    HYDROcodone-acetaminophen 5-325 MG Oral Tab Take 1 tablet by mouth every 6 (six) hours as needed for Pain. 60 tablet 0    Tirzepatide-Weight Management (ZEPBOUND) 2.5 MG/0.5ML Subcutaneous Solution Inject 2.5 mg into the skin once a week. 2 mL 0    escitalopram (LEXAPRO) 20 MG Oral Tab Take 1 tablet (20 mg total) by mouth daily. 90 tablet 1    diclofenac 75 MG Oral Tab EC Take 1 tablet (75 mg total) by mouth 2 (two) times daily. 180 tablet 1    Calcium Carbonate-Vitamin D 600-400 MG-UNIT Oral Tab Take 1 tablet by mouth daily.      Multiple Vitamins-Minerals (CENTRUM SILVER) Oral Tab Take 1 tablet by mouth daily.     [7]   Patient Active Problem List  Diagnosis    Ex-smoker    Depression    Obese    Dysthymic disorder    Hypersomnia, persistent    Lumbar radiculitis    Encounter for therapeutic drug monitoring    Osteoarthritis of multiple joints    Prediabetes

## 2025-05-29 NOTE — TELEPHONE ENCOUNTER
Requested Prescriptions     Pending Prescriptions Disp Refills    HYDROcodone-acetaminophen 5-325 MG Oral Tab 60 tablet 0     Sig: Take 1 tablet by mouth every 6 (six) hours as needed for Pain.     Last refill 4/26/25 #60  LOV 5/27/25  Future Appointments   Date Time Provider Department Center                 9/8/2025 10:30 AM Isaura Nunn MD [facility] [id number]

## 2025-05-30 ENCOUNTER — PATIENT MESSAGE (OUTPATIENT)
Dept: FAMILY MEDICINE CLINIC | Facility: CLINIC | Age: 70
End: 2025-05-30

## 2025-05-30 RX ORDER — HYDROCODONE BITARTRATE AND ACETAMINOPHEN 5; 325 MG/1; MG/1
1 TABLET ORAL EVERY 6 HOURS PRN
Qty: 30 TABLET | Refills: 0 | Status: SHIPPED | OUTPATIENT
Start: 2025-05-30

## 2025-06-09 ENCOUNTER — MED REC SCAN ONLY (OUTPATIENT)
Dept: FAMILY MEDICINE CLINIC | Facility: CLINIC | Age: 70
End: 2025-06-09

## 2025-06-12 DIAGNOSIS — M17.0 PRIMARY OSTEOARTHRITIS OF BOTH KNEES: ICD-10-CM

## 2025-06-12 DIAGNOSIS — M25.50 CHRONIC PAIN OF MULTIPLE JOINTS: ICD-10-CM

## 2025-06-12 DIAGNOSIS — M16.0 PRIMARY OSTEOARTHRITIS OF BOTH HIPS: ICD-10-CM

## 2025-06-12 DIAGNOSIS — M25.50 ARTHRALGIA OF MULTIPLE JOINTS: ICD-10-CM

## 2025-06-12 DIAGNOSIS — M54.41 CHRONIC BILATERAL LOW BACK PAIN WITH RIGHT-SIDED SCIATICA: ICD-10-CM

## 2025-06-12 DIAGNOSIS — G89.29 CHRONIC PAIN OF MULTIPLE JOINTS: ICD-10-CM

## 2025-06-12 DIAGNOSIS — G89.29 CHRONIC BILATERAL LOW BACK PAIN WITH RIGHT-SIDED SCIATICA: ICD-10-CM

## 2025-06-12 RX ORDER — GABAPENTIN 300 MG/1
900 CAPSULE ORAL 3 TIMES DAILY
Qty: 270 CAPSULE | Refills: 2 | OUTPATIENT
Start: 2025-06-12

## 2025-06-12 RX ORDER — HYDROCODONE BITARTRATE AND ACETAMINOPHEN 5; 325 MG/1; MG/1
1 TABLET ORAL EVERY 6 HOURS PRN
Qty: 30 TABLET | Refills: 0 | Status: CANCELLED | OUTPATIENT
Start: 2025-06-12

## 2025-06-12 NOTE — TELEPHONE ENCOUNTER
Is a patient still requiring this narcotic she should be using Tylenol for postop pain.  She should follow-up with the surgeon if she is still continuing to have discomfort

## 2025-06-12 NOTE — TELEPHONE ENCOUNTER
Last Office Visit: 5/27/25  Last Refill: 5/30/25  Return to Clinic: 3 months   Protocol: failed   NOV:  9/8/25  Requested Prescriptions     Pending Prescriptions Disp Refills    HYDROcodone-acetaminophen 5-325 MG Oral Tab 30 tablet 0     Sig: Take 1 tablet by mouth every 6 (six) hours as needed for Pain.         Please approve if appropriate.     Thank you!

## 2025-06-13 ENCOUNTER — PATIENT MESSAGE (OUTPATIENT)
Dept: FAMILY MEDICINE CLINIC | Facility: CLINIC | Age: 70
End: 2025-06-13

## 2025-06-13 DIAGNOSIS — G89.29 CHRONIC BILATERAL LOW BACK PAIN WITH RIGHT-SIDED SCIATICA: ICD-10-CM

## 2025-06-13 DIAGNOSIS — G89.29 CHRONIC PAIN OF MULTIPLE JOINTS: ICD-10-CM

## 2025-06-13 DIAGNOSIS — M16.0 PRIMARY OSTEOARTHRITIS OF BOTH HIPS: ICD-10-CM

## 2025-06-13 DIAGNOSIS — M25.50 ARTHRALGIA OF MULTIPLE JOINTS: ICD-10-CM

## 2025-06-13 DIAGNOSIS — M25.50 CHRONIC PAIN OF MULTIPLE JOINTS: ICD-10-CM

## 2025-06-13 DIAGNOSIS — M17.0 PRIMARY OSTEOARTHRITIS OF BOTH KNEES: ICD-10-CM

## 2025-06-13 DIAGNOSIS — M54.41 CHRONIC BILATERAL LOW BACK PAIN WITH RIGHT-SIDED SCIATICA: ICD-10-CM

## 2025-06-13 NOTE — TELEPHONE ENCOUNTER
Requested Prescriptions     Pending Prescriptions Disp Refills    HYDROcodone-acetaminophen 5-325 MG Oral Tab 30 tablet 0     Sig: Take 1 tablet by mouth every 6 (six) hours as needed for Pain.     Last refill 5/30/25 #30  Please see message from patient and refill if appropriate.  Thank you.      I requested a refill of hydrocodone, and it was denied.  The doctor thought I was requesting for post open pain.  I am having bilateral hip replacements on 6/23/25.  This prescription is for before the surgery.  It is very painful to even stand, and extremely difficult to walk at all.  Please reconsider refilling this prescription.  The surgeon will manage pain medications after surgery.

## 2025-06-16 RX ORDER — HYDROCODONE BITARTRATE AND ACETAMINOPHEN 5; 325 MG/1; MG/1
1 TABLET ORAL EVERY 6 HOURS PRN
Qty: 30 TABLET | Refills: 0 | Status: SHIPPED | OUTPATIENT
Start: 2025-06-16

## 2025-07-01 ENCOUNTER — MED REC SCAN ONLY (OUTPATIENT)
Dept: FAMILY MEDICINE CLINIC | Facility: CLINIC | Age: 70
End: 2025-07-01

## 2025-07-24 ENCOUNTER — OFFICE VISIT (OUTPATIENT)
Dept: FAMILY MEDICINE CLINIC | Facility: CLINIC | Age: 70
End: 2025-07-24
Payer: MEDICARE

## 2025-07-24 ENCOUNTER — LAB ENCOUNTER (OUTPATIENT)
Dept: LAB | Age: 70
End: 2025-07-24
Attending: FAMILY MEDICINE
Payer: MEDICARE

## 2025-07-24 VITALS
SYSTOLIC BLOOD PRESSURE: 126 MMHG | HEART RATE: 76 BPM | HEIGHT: 60 IN | BODY MASS INDEX: 43.19 KG/M2 | WEIGHT: 220 LBS | RESPIRATION RATE: 20 BRPM | DIASTOLIC BLOOD PRESSURE: 72 MMHG | OXYGEN SATURATION: 95 % | TEMPERATURE: 98 F

## 2025-07-24 DIAGNOSIS — D64.9 ANEMIA, UNSPECIFIED TYPE: ICD-10-CM

## 2025-07-24 DIAGNOSIS — M25.452 HIP SWELLING, LEFT: Primary | ICD-10-CM

## 2025-07-24 LAB
BASOPHILS # BLD AUTO: 0.02 X10(3) UL (ref 0–0.2)
BASOPHILS NFR BLD AUTO: 0.3 %
EOSINOPHIL # BLD AUTO: 0.21 X10(3) UL (ref 0–0.7)
EOSINOPHIL NFR BLD AUTO: 2.8 %
ERYTHROCYTE [DISTWIDTH] IN BLOOD BY AUTOMATED COUNT: 14 %
HCT VFR BLD AUTO: 33.7 % (ref 35–48)
HGB BLD-MCNC: 10.4 G/DL (ref 12–16)
IMM GRANULOCYTES # BLD AUTO: 0.03 X10(3) UL (ref 0–1)
IMM GRANULOCYTES NFR BLD: 0.4 %
LYMPHOCYTES # BLD AUTO: 3.2 X10(3) UL (ref 1–4)
LYMPHOCYTES NFR BLD AUTO: 41.9 %
MCH RBC QN AUTO: 30.1 PG (ref 26–34)
MCHC RBC AUTO-ENTMCNC: 30.9 G/DL (ref 31–37)
MCV RBC AUTO: 97.4 FL (ref 80–100)
MONOCYTES # BLD AUTO: 0.63 X10(3) UL (ref 0.1–1)
MONOCYTES NFR BLD AUTO: 8.3 %
NEUTROPHILS # BLD AUTO: 3.54 X10 (3) UL (ref 1.5–7.7)
NEUTROPHILS # BLD AUTO: 3.54 X10(3) UL (ref 1.5–7.7)
NEUTROPHILS NFR BLD AUTO: 46.3 %
PLATELET # BLD AUTO: 363 10(3)UL (ref 150–450)
RBC # BLD AUTO: 3.46 X10(6)UL (ref 3.8–5.3)
WBC # BLD AUTO: 7.6 X10(3) UL (ref 4–11)

## 2025-07-24 PROCEDURE — 99214 OFFICE O/P EST MOD 30 MIN: CPT | Performed by: FAMILY MEDICINE

## 2025-07-24 PROCEDURE — 85025 COMPLETE CBC W/AUTO DIFF WBC: CPT

## 2025-07-24 PROCEDURE — 36415 COLL VENOUS BLD VENIPUNCTURE: CPT

## 2025-07-24 RX ORDER — FERROUS SULFATE 325(65) MG
325 TABLET ORAL 2 TIMES DAILY WITH MEALS
COMMUNITY
Start: 2025-07-11 | End: 2025-08-10

## 2025-07-24 RX ORDER — TRAMADOL HYDROCHLORIDE 50 MG/1
TABLET ORAL
COMMUNITY
Start: 2025-06-17

## 2025-07-24 RX ORDER — ACETAMINOPHEN 500 MG
TABLET ORAL
COMMUNITY
Start: 2025-01-01

## 2025-07-25 NOTE — PROGRESS NOTES
[facility] Family Medicine Office Note  Chief Complaint:   Chief Complaint   Patient presents with    Post-Op       HPI:   This is a 69 year old female coming in for postop follow-up.  The patient states that she has been doing well since her hip replacements.  But states there has been some swelling of the left hip.  She is about 4 weeks out.  She also did have some complications during the surgery in regards to blood loss and anemia.  She was asked to start iron supplementation states that she has been doing well with physical therapy.      Past Medical History[1]  Past Surgical History[2]  Social History:  Short Social Hx on File[3]  Family History:  Family History[4]  Allergies:  Allergies[5]  Current Meds:  Current Medications[6]   Counseling given: Not Answered  Tobacco comments: quit about 8 years ago  Updated 4/4/25       REVIEW OF SYSTEMS:   Consitutional: No fevers, chills, sweats  Eye: No recent visual problems  ENMT: No ear pain nasal congestion sore throat  Respiratory: No shortness of breath, cough  Cardiovascular: No chest pain palpitations syncope  Gastrointestinal: No nausea vomiting diarrhea  Genitourinary: No hematuria  Hema/Lymph no bruising tendency, swollen lymph glands  Endocrine: Negative for excessive thirst excessive hunger  Musculoskeletal: No back pain neck pain joint pain muscle pain decreased range of motion    Medical, surgical, family, and social histories were reviewed      EXAM:   VITALS:   Vitals:    07/24/25 1221   BP: 126/72   Pulse: 76   Resp: 20   Temp: 97.6 °F (36.4 °C)      GENERAL: well developed, well nourished, in no apparent distress  SKIN: no rashes, no suspicious lesions: Cool and Dry  HEENT: atraumatic, normocephalic, ears and throat are clear    Ears: TM's clear and visible bilaterally, no excess cerumen or erythema.   EYES: Pupils equal round and reactive.  Extraocular motions intact no scleral icterus no injection or drainage  THROAT without erythema  tonsillar hypertrophy or exudate.  Uvula midline airway patent  NECK: Given midline.  No JVD or lymphadenopathy supple nontender no meningeal signs   LUNGS: clear to auscultation sounds equal bilaterally no wheezes rales or rhonchi  CARDIO: Regular rate and rhythm without murmurs gallops or rubs  Hip bilateral surgical sites healing well no signs of infection no erythema there is some increased amount of swelling of the left hip no pain to palpation    ASSESSMENT AND PLAN:   1. Hip swelling, left  - US HIP LEFT COMPLETE (CPT=76881); Future  Did discuss with the patient this could be just the swelling seroma possibly did discuss would like to complete an ultrasound of the area.  She was asked to continue to follow-up with surgery as well as physical therapy.  2. Anemia, unspecified type  - CBC With Differential With Platelet; Future  Patient did have some episodes of low hemoglobin I did discuss her like for her to continue with iron supplementation we will be updating a CBC today.    Meds & Refills for this Visit:  Requested Prescriptions      No prescriptions requested or ordered in this encounter       Health Maintenance:  Health Maintenance Due   Topic Date Due    Colorectal Cancer Screening  Never done    Mammogram  Never done    Pneumococcal Vaccine: 50+ Years (1 of 1 - PCV) Never done    Zoster Vaccines (2 of 3) 01/12/2016    DEXA Scan  Never done    COVID-19 Vaccine (1 - 2024-25 season) Never done    Annual Physical  05/14/2025       Patient/Caregiver Education: Patient/Caregiver Education: There are no barriers to learning. Medical education done.   Outcome: Patient verbalizes understanding. Patient is notified to call with any questions, complications, allergies, or worsening or changing symptoms.  Patient is to call with any side effects or complications from the treatments as a result of today.     Problem List:  Problem List[7]      No follow-ups on file.     Weston Pelaez MD    Please note that  portions of this note may have been completed with a voice recognition program. Efforts were made to edit the dictations but occasionally words are mis-transcribed.       [1]   Past Medical History:   Arthritis    First Treatment by Dr. Prince    Depression    Obesity, unspecified   [2]   Past Surgical History:  Procedure Laterality Date    Cataract            Other surgical history      rectovag fistula    Other surgical history      gum surgery    Other surgical history      wisdom tooth   [3]   Social History  Socioeconomic History    Marital status:     Number of children: 2   Occupational History    Occupation: office work   Tobacco Use    Smoking status: Former     Current packs/day: 0.00     Average packs/day: 1.8 packs/day for 32.0 years (57.6 ttl pk-yrs)     Types: Cigarettes     Start date: 1972     Quit date: 2004     Years since quittin.5    Smokeless tobacco: Never    Tobacco comments:     quit about 8 years ago     Updated 25   Vaping Use    Vaping status: Never Used   Substance and Sexual Activity    Alcohol use: Yes     Alcohol/week: 2.0 standard drinks of alcohol     Types: 2 Glasses of wine per week     Comment: I only have alcohol occasionally.    Drug use: Never    Sexual activity: Not Currently   Other Topics Concern    Caffeine Concern No    Stress Concern No    Weight Concern Yes    Special Diet No    Exercise No    Seat Belt No     Social Drivers of Health     Food Insecurity: Low Risk  (2025)    Received from [facility] Food Security     Within the past 12 months, the food you bought just didn't last and you didn't have money to get more.: 3     Within the past 12 months, you worried that your food would run out before you got money to buy more.: 3   Transportation Needs: No Transportation Needs (2025)    Received from [employer]    OASIS : Transportation     Lack of Transportation (Medical): No     Lack of Transportation  (Non-Medical): No     Patient Unable or Declines to Respond: No   Housing Stability: Not At Risk (7/7/2025)    Received from [facility] Housing     What is your living situation today?: I have a steady place to live     Think about the place you live. Do you have problems with any of the following?: None of the above   [4]   Family History  Problem Relation Age of Onset    Heart Disorder Father         pacemaker    Other (Other) Father         COPD    Heart Disorder Mother         MI; smoker    Depression Mother     Diabetes Mother     Obesity Mother     Psychiatric Mother     Cancer Paternal Grandmother     Depression Sister     Obesity Sister     Psychiatric Sister    [5] No Known Allergies  [6]   Current Outpatient Medications   Medication Sig Dispense Refill    traMADol 50 MG Oral Tab       Ferrous Sulfate 325 (65 Fe) MG Oral Tab Take 1 tablet (325 mg total) by mouth 2 (two) times daily with meals.      apixaban 2.5 MG Oral Tab Take 1 tablet (2.5 mg total) by mouth 2 (two) times daily.      acetaminophen (TYLENOL EXTRA STRENGTH) 500 MG Oral Tab       traZODone 50 MG Oral Tab Take 1 tablet (50 mg total) by mouth nightly as needed for Sleep. 30 tablet 0    escitalopram (LEXAPRO) 20 MG Oral Tab Take 1 tablet (20 mg total) by mouth daily. 90 tablet 1    gabapentin 300 MG Oral Cap Take 3 capsules (900 mg total) by mouth 3 (three) times daily. 270 capsule 2    Tirzepatide-Weight Management (ZEPBOUND) 2.5 MG/0.5ML Subcutaneous Solution Inject 2.5 mg into the skin once a week. (Patient not taking: Reported on 7/24/2025) 2 mL 0   [7]   Patient Active Problem List  Diagnosis    Ex-smoker    Depression    Obese    Dysthymic disorder    Hypersomnia, persistent    Lumbar radiculitis    Encounter for therapeutic drug monitoring    Osteoarthritis of multiple joints    Prediabetes

## 2025-08-04 ENCOUNTER — OFFICE VISIT (OUTPATIENT)
Dept: INTERNAL MEDICINE CLINIC | Facility: CLINIC | Age: 70
End: 2025-08-04

## 2025-08-04 VITALS
SYSTOLIC BLOOD PRESSURE: 132 MMHG | WEIGHT: 212 LBS | BODY MASS INDEX: 40.02 KG/M2 | DIASTOLIC BLOOD PRESSURE: 76 MMHG | HEIGHT: 61 IN | HEART RATE: 66 BPM | RESPIRATION RATE: 16 BRPM

## 2025-08-04 DIAGNOSIS — Z51.81 ENCOUNTER FOR THERAPEUTIC DRUG MONITORING: Primary | ICD-10-CM

## 2025-08-04 DIAGNOSIS — R73.03 PREDIABETES: ICD-10-CM

## 2025-08-04 DIAGNOSIS — E66.813 CLASS 3 SEVERE OBESITY WITH SERIOUS COMORBIDITY AND BODY MASS INDEX (BMI) OF 50.0 TO 59.9 IN ADULT, UNSPECIFIED OBESITY TYPE: ICD-10-CM

## 2025-08-04 DIAGNOSIS — M15.9 OSTEOARTHRITIS OF MULTIPLE JOINTS, UNSPECIFIED OSTEOARTHRITIS TYPE: ICD-10-CM

## 2025-08-04 PROCEDURE — 99213 OFFICE O/P EST LOW 20 MIN: CPT | Performed by: NURSE PRACTITIONER

## 2025-08-13 ENCOUNTER — MED REC SCAN ONLY (OUTPATIENT)
Dept: FAMILY MEDICINE CLINIC | Facility: CLINIC | Age: 70
End: 2025-08-13

## 2025-08-26 DIAGNOSIS — G89.29 CHRONIC BILATERAL LOW BACK PAIN WITH RIGHT-SIDED SCIATICA: ICD-10-CM

## 2025-08-26 DIAGNOSIS — M25.50 ARTHRALGIA OF MULTIPLE JOINTS: ICD-10-CM

## 2025-08-26 DIAGNOSIS — M16.0 PRIMARY OSTEOARTHRITIS OF BOTH HIPS: ICD-10-CM

## 2025-08-26 DIAGNOSIS — M54.41 CHRONIC BILATERAL LOW BACK PAIN WITH RIGHT-SIDED SCIATICA: ICD-10-CM

## 2025-08-26 DIAGNOSIS — M17.0 PRIMARY OSTEOARTHRITIS OF BOTH KNEES: ICD-10-CM

## 2025-08-26 DIAGNOSIS — M25.50 CHRONIC PAIN OF MULTIPLE JOINTS: ICD-10-CM

## 2025-08-26 DIAGNOSIS — G89.29 CHRONIC PAIN OF MULTIPLE JOINTS: ICD-10-CM

## 2025-08-28 RX ORDER — GABAPENTIN 300 MG/1
900 CAPSULE ORAL 3 TIMES DAILY
Qty: 270 CAPSULE | Refills: 2 | Status: SHIPPED | OUTPATIENT
Start: 2025-08-28

## (undated) DEVICE — AVANOS* TUOHY EPIDURAL NEEDLE: Brand: AVANOS

## (undated) DEVICE — NEEDLE SPNL 22GA L7IN BLK LNG LEN DISP

## (undated) DEVICE — PAIN TRAY: Brand: MEDLINE INDUSTRIES, INC.

## (undated) DEVICE — SYRINGE 10ML SLIP TIP LOSS OF RESIST PLAS

## (undated) DEVICE — GLOVE SUR 7.5 SENSICARE PIP WHT PWD F

## (undated) DEVICE — BANDAGE ADH 1INX3IN NAT FAB N ADH PD CURAD

## (undated) DEVICE — SKIN REG/FINE DUAL MARKER, RULER, LABELS: Brand: MEDLINE

## (undated) DEVICE — REMOVER LOT 4OZ N IRRIG UNSCNT SFT MOIST LIQ

## (undated) DEVICE — NEEDLE SPNL 22GA L5IN BLK HUB QNCKE BVL DISP

## (undated) DEVICE — GLOVE,SURG,SENSICARE,ALOE,LF,PF,7: Brand: MEDLINE

## (undated) DEVICE — GLOVE SUR 6.5 SENSICARE PIP WHT PWD F

## (undated) NOTE — LETTER
25      Orthopedic Surgery   Pre-Operative Clearance Request    Patient Name:   Tejal Franz             :   1955    Surgeon: Dr. Prince             Date of Surgery: 7/15/2025    Surgical Procedure: Left posterior cemented total hip arthroplasty       MUST COMPLETE ALL OF THE FOLLOWING 2-3 WEEKS PRIOR TO YOUR SURGERY TO AVOID CANCELLATION, DUE TO THE RULE THEIR WILL BE NO EXCEPTIONS!      [x]  History and Physical      [x]  Medical  Clearance                     Required pre-op testing to be ordered:                      [x]  CBC W/Diff                                                                 [x]  CMP                                                                                                                                                                                                                                          [x]  PT/INR  [x]  PTT  [x]  Type and Screen                     **Please fax test results, H&P, and clearance to [phone] and to [facility] at [phone]**

## (undated) NOTE — LETTER
AUTHORIZATION FOR SURGICAL OPERATION OR OTHER PROCEDURE    1. I hereby authorize Dr. ENRIQUEZ, and [facility] staff assigned to my case to perform the following operation and/or procedure at the [facility] site:    _______________________________________________________________________________________________    BILATERAL HIP INJECTIONS  _______________________________________________________________________________________________    2.  My physician has explained the nature and purpose of the operation or other procedure, possible alternative methods of treatment, the risks involved, and the possibility of complication to me.  I acknowledge that no guarantee has been made as to the result that may be obtained.  3.  I recognize that, during the course of this operation, or other procedure, unforseen conditions may necessitate additional or different procedure than those listed above.  I, therefore, further authorize and request that the above named physician, his/her physician assistants or designees perform such procedures as are, in his/her professional opinion, necessary and desirable.  4.  Any tissue or organs removed in the operation or other procedure may be disposed of by and at the discretion of the [facility] and [facility].  5.  I understand that in the event of a medical emergency, I will be transported by local paramedics to [facility] or other hospital emergency department.  6.  I certify that I have read and fully understand the above consent to operation and/or other procedure.    7.  I acknowledge that my physician has explained sedation/analgesia administration to me including the risks and benefits.  I consent to the administration of sedation/analgesia as may be necessary or desirable in the judgement of my physician.    Witness signature: ___________________________________________________ Date:  08/20/2024                    Time:   ________ A.M.  P.M.       Patient Name:  ______________________________________________________  (please print)      Patient signature:  ___________________________________________________             Relationship to Patient:           []  Parent    Responsible person                          []  Spouse  In case of minor or                    [] Other  _____________   Incompetent name:  __________________________________________________                               (please print)      _____________      Responsible person  In case of minor or  Incompetent signature:  _______________________________________________    Statement of Physician  My signature below affirms that prior to the time of the procedure, I have explained to the patient and/or his/her guardian, the risks and benefits involved in the proposed treatment and any reasonable alternative to the proposed treatment.  I have also explained the risks and benefits involved in the refusal of the proposed treatment and have answered the patient's questions.                        Date:   08/20/2024  Provider                      Signature:  __________________________________________________________       Time:  ___________ A.M    P.M.

## (undated) NOTE — LETTER
Patient Name: Tejal Franz        : 1955       Medical Record #: [id number]    CONSENT FOR PROCEDURES/SEDATION    Date: 2024       Time: 10:03 AM        1. I authorize the performance upon Tejal Franz the following:    Bilateral synvisc injections   __________________________________________________________________________    2. I authorize  (and whomever is designated as the doctor’s assistant), to perform the above mentioned procedures.    3. If any unforeseen conditions arise during this procedure calling for additional procedures, operations, or medications (including anesthesia and blood transfusion), I  further request and authorize the doctor to do whatever he/she deems advisable in my interest.    4. I consent to the taking and reproduction of any photographs in the course of this procedure for professional purposes.    5. I consent to the administration of such sedation as may be considered necessary or advisable by the physician responsible for this service, with the exception of  _____________________________.    6. I have been informed by my doctor of the nature and purpose of this procedure/sedation, possible alternative methods of treatment, risk involved and possible complications.      Signature of Patient:___________________________  Date: 24    Signature of person authorized to consent for patient: Relationship to patient:  ___________________________    ___________________    Witness: ____________________     Date: 24    Provider: ____________________     Date: 24

## (undated) NOTE — LETTER
Patient Name: Tejal Franz        : 1955       Medical Record #: [id number]    CONSENT FOR PROCEDURES/SEDATION    Date: 2024       Time: 10:12 AM        1. I authorize the performance upon Tejal Franz the following:    __________________________________________________________________________    2. I authorize Dr. Ramirez (and whomever is designated as the doctor’s assistant), to perform the above mentioned procedures.    3. If any unforeseen conditions arise during this procedure calling for additional procedures, operations, or medications (including anesthesia and blood transfusion), I  further request and authorize the doctor to do whatever he/she deems advisable in my interest.    4. I consent to the taking and reproduction of any photographs in the course of this procedure for professional purposes.    5. I consent to the administration of such sedation as may be considered necessary or advisable by the physician responsible for this service, with the exception of  _____________________________.    6. I have been informed by my doctor of the nature and purpose of this procedure/sedation, possible alternative methods of treatment, risk involved and possible complications.      Signature of Patient:___________________________  Date: 24    Signature of person authorized to consent for patient: Relationship to patient:  ___________________________    ___________________    Witness: ____________________     Date: 24    Provider: ____________________     Date: 24

## (undated) NOTE — LETTER
Patient Name: Tejal Franz        : 1955       Medical Record #: [id number]    CONSENT FOR PROCEDURES/SEDATION    Date: 2024       Time: 9:48 AM        1. I authorize the performance upon Tejal Franz the following:    BILATERAL SYNVISC INJECTIONS   __________________________________________________________________________    2. I authorize Dr. ENRIQUEZ (and whomever is designated as the doctor’s assistant), to perform the above mentioned procedures.    3. If any unforeseen conditions arise during this procedure calling for additional procedures, operations, or medications (including anesthesia and blood transfusion), I  further request and authorize the doctor to do whatever he/she deems advisable in my interest.    4. I consent to the taking and reproduction of any photographs in the course of this procedure for professional purposes.    5. I consent to the administration of such sedation as may be considered necessary or advisable by the physician responsible for this service, with the exception of  _____________________________.    6. I have been informed by my doctor of the nature and purpose of this procedure/sedation, possible alternative methods of treatment, risk involved and possible complications.      Signature of Patient:      DATE: 24  ___________________________    Signature of person authorized to consent for patient: Relationship to patient:  ___________________________    ___________________    Witness: ____________________     DATE: 24    Provider: ____________________     DATE: 24

## (undated) NOTE — LETTER
AUTHORIZATION FOR SURGICAL OPERATION OR OTHER PROCEDURE    1. I hereby authorize Dr. ENRIQUEZ, and [facility] staff assigned to my case to perform the following operation and/or procedure at the [facility] site:    _______________________________________________________________________________________________    BILATERAL KNEE SYNVIC INJECTION  _______________________________________________________________________________________________    2.  My physician has explained the nature and purpose of the operation or other procedure, possible alternative methods of treatment, the risks involved, and the possibility of complication to me.  I acknowledge that no guarantee has been made as to the result that may be obtained.  3.  I recognize that, during the course of this operation, or other procedure, unforseen conditions may necessitate additional or different procedure than those listed above.  I, therefore, further authorize and request that the above named physician, his/her physician assistants or designees perform such procedures as are, in his/her professional opinion, necessary and desirable.  4.  Any tissue or organs removed in the operation or other procedure may be disposed of by and at the discretion of the [facility] and [facility].  5.  I understand that in the event of a medical emergency, I will be transported by local paramedics to [facility] or other hospital emergency department.  6.  I certify that I have read and fully understand the above consent to operation and/or other procedure.    7.  I acknowledge that my physician has explained sedation/analgesia administration to me including the risks and benefits.  I consent to the administration of sedation/analgesia as may be necessary or desirable in the judgement of my physician.    Witness signature: ___________________________________________________ Date:  08/22/24                     Time:  ________ A.M.  P.M.       Patient Name:  ______________________________________________________  (please print)      Patient signature:  ___________________________________________________             Relationship to Patient:           []  Parent    Responsible person                          []  Spouse  In case of minor or                    [] Other  _____________   Incompetent name:  __________________________________________________                               (please print)      _____________      Responsible person  In case of minor or  Incompetent signature:  _______________________________________________    Statement of Physician  My signature below affirms that prior to the time of the procedure, I have explained to the patient and/or his/her guardian, the risks and benefits involved in the proposed treatment and any reasonable alternative to the proposed treatment.  I have also explained the risks and benefits involved in the refusal of the proposed treatment and have answered the patient's questions.                        Date:  08/22/24  Provider                      Signature:  __________________________________________________________       Time:  ___________ A.M    P.M.